# Patient Record
Sex: MALE | Race: WHITE | NOT HISPANIC OR LATINO | Employment: OTHER | URBAN - METROPOLITAN AREA
[De-identification: names, ages, dates, MRNs, and addresses within clinical notes are randomized per-mention and may not be internally consistent; named-entity substitution may affect disease eponyms.]

---

## 2017-02-08 RX ORDER — INSULIN GLARGINE 100 [IU]/ML
INJECTION, SOLUTION SUBCUTANEOUS
Qty: 45 SYRINGE | Refills: 0 | Status: SHIPPED | OUTPATIENT
Start: 2017-02-08 | End: 2017-11-16 | Stop reason: SDUPTHER

## 2017-05-04 ENCOUNTER — TELEPHONE (OUTPATIENT)
Dept: INTERNAL MEDICINE | Facility: CLINIC | Age: 72
End: 2017-05-04

## 2017-05-04 DIAGNOSIS — E78.5 TYPE 2 DIABETES MELLITUS WITH HYPERLIPIDEMIA: Primary | ICD-10-CM

## 2017-05-04 DIAGNOSIS — E11.69 TYPE 2 DIABETES MELLITUS WITH HYPERLIPIDEMIA: Primary | ICD-10-CM

## 2017-05-04 RX ORDER — ATORVASTATIN CALCIUM 40 MG/1
TABLET, FILM COATED ORAL
Qty: 30 TABLET | Refills: 1 | Status: SHIPPED | OUTPATIENT
Start: 2017-05-04 | End: 2017-06-16 | Stop reason: SDUPTHER

## 2017-05-04 RX ORDER — GLIPIZIDE 10 MG/1
TABLET ORAL
Qty: 30 TABLET | Refills: 1 | Status: SHIPPED | OUTPATIENT
Start: 2017-05-04 | End: 2017-09-13 | Stop reason: SDUPTHER

## 2017-05-18 RX ORDER — OMEPRAZOLE 40 MG/1
CAPSULE, DELAYED RELEASE ORAL
Qty: 90 CAPSULE | Refills: 1 | Status: SHIPPED | OUTPATIENT
Start: 2017-05-18 | End: 2017-09-13 | Stop reason: SDUPTHER

## 2017-06-16 ENCOUNTER — PATIENT MESSAGE (OUTPATIENT)
Dept: INTERNAL MEDICINE | Facility: CLINIC | Age: 72
End: 2017-06-16

## 2017-06-16 ENCOUNTER — OFFICE VISIT (OUTPATIENT)
Dept: INTERNAL MEDICINE | Facility: CLINIC | Age: 72
End: 2017-06-16
Payer: MEDICARE

## 2017-06-16 ENCOUNTER — LAB VISIT (OUTPATIENT)
Dept: LAB | Facility: HOSPITAL | Age: 72
End: 2017-06-16
Attending: INTERNAL MEDICINE
Payer: MEDICARE

## 2017-06-16 VITALS
DIASTOLIC BLOOD PRESSURE: 70 MMHG | BODY MASS INDEX: 25.06 KG/M2 | WEIGHT: 175.06 LBS | HEART RATE: 71 BPM | HEIGHT: 70 IN | SYSTOLIC BLOOD PRESSURE: 116 MMHG

## 2017-06-16 DIAGNOSIS — R53.83 FATIGUE, UNSPECIFIED TYPE: ICD-10-CM

## 2017-06-16 DIAGNOSIS — Z12.5 ENCOUNTER FOR SCREENING FOR MALIGNANT NEOPLASM OF PROSTATE: ICD-10-CM

## 2017-06-16 DIAGNOSIS — I15.2 HYPERTENSION ASSOCIATED WITH DIABETES: ICD-10-CM

## 2017-06-16 DIAGNOSIS — Z85.828 HISTORY OF BASAL CELL CARCINOMA: ICD-10-CM

## 2017-06-16 DIAGNOSIS — Z00.00 HEALTH CARE MAINTENANCE: ICD-10-CM

## 2017-06-16 DIAGNOSIS — Z79.4 TYPE 2 DIABETES MELLITUS WITH OTHER SPECIFIED COMPLICATION, WITH LONG-TERM CURRENT USE OF INSULIN: ICD-10-CM

## 2017-06-16 DIAGNOSIS — E11.59 HYPERTENSION ASSOCIATED WITH DIABETES: ICD-10-CM

## 2017-06-16 DIAGNOSIS — E11.69 TYPE 2 DIABETES MELLITUS WITH OTHER SPECIFIED COMPLICATION, WITH LONG-TERM CURRENT USE OF INSULIN: ICD-10-CM

## 2017-06-16 DIAGNOSIS — E78.5 TYPE 2 DIABETES MELLITUS WITH HYPERLIPIDEMIA: ICD-10-CM

## 2017-06-16 DIAGNOSIS — E11.69 TYPE 2 DIABETES MELLITUS WITH HYPERLIPIDEMIA: ICD-10-CM

## 2017-06-16 DIAGNOSIS — K51.919 ULCERATIVE COLITIS WITH COMPLICATION, UNSPECIFIED LOCATION: ICD-10-CM

## 2017-06-16 LAB
ALBUMIN SERPL BCP-MCNC: 3.9 G/DL
ALP SERPL-CCNC: 128 U/L
ALT SERPL W/O P-5'-P-CCNC: 21 U/L
ANION GAP SERPL CALC-SCNC: 9 MMOL/L
AST SERPL-CCNC: 20 U/L
BASOPHILS # BLD AUTO: 0.03 K/UL
BASOPHILS NFR BLD: 0.4 %
BILIRUB SERPL-MCNC: 0.5 MG/DL
BUN SERPL-MCNC: 15 MG/DL
CALCIUM SERPL-MCNC: 9.5 MG/DL
CHLORIDE SERPL-SCNC: 103 MMOL/L
CHOLEST/HDLC SERPL: 5.7 {RATIO}
CO2 SERPL-SCNC: 28 MMOL/L
COMPLEXED PSA SERPL-MCNC: 2.8 NG/ML
CREAT SERPL-MCNC: 1 MG/DL
DIFFERENTIAL METHOD: NORMAL
EOSINOPHIL # BLD AUTO: 0.4 K/UL
EOSINOPHIL NFR BLD: 5.6 %
ERYTHROCYTE [DISTWIDTH] IN BLOOD BY AUTOMATED COUNT: 13.5 %
EST. GFR  (AFRICAN AMERICAN): >60 ML/MIN/1.73 M^2
EST. GFR  (NON AFRICAN AMERICAN): >60 ML/MIN/1.73 M^2
GLUCOSE SERPL-MCNC: 131 MG/DL
HCT VFR BLD AUTO: 45.8 %
HDL/CHOLESTEROL RATIO: 17.6 %
HDLC SERPL-MCNC: 199 MG/DL
HDLC SERPL-MCNC: 35 MG/DL
HGB BLD-MCNC: 14.7 G/DL
LDLC SERPL CALC-MCNC: 141 MG/DL
LYMPHOCYTES # BLD AUTO: 2.5 K/UL
LYMPHOCYTES NFR BLD: 32.2 %
MCH RBC QN AUTO: 28.9 PG
MCHC RBC AUTO-ENTMCNC: 32.1 %
MCV RBC AUTO: 90 FL
MONOCYTES # BLD AUTO: 0.7 K/UL
MONOCYTES NFR BLD: 9.3 %
NEUTROPHILS # BLD AUTO: 4.1 K/UL
NEUTROPHILS NFR BLD: 52.1 %
NONHDLC SERPL-MCNC: 164 MG/DL
PLATELET # BLD AUTO: 243 K/UL
PMV BLD AUTO: 10.2 FL
POTASSIUM SERPL-SCNC: 4.7 MMOL/L
PROT SERPL-MCNC: 7.8 G/DL
RBC # BLD AUTO: 5.09 M/UL
SODIUM SERPL-SCNC: 140 MMOL/L
TRIGL SERPL-MCNC: 115 MG/DL
TSH SERPL DL<=0.005 MIU/L-ACNC: 1.57 UIU/ML
WBC # BLD AUTO: 7.82 K/UL

## 2017-06-16 PROCEDURE — 80053 COMPREHEN METABOLIC PANEL: CPT

## 2017-06-16 PROCEDURE — 3045F PR MOST RECENT HEMOGLOBIN A1C LEVEL 7.0-9.0%: CPT | Mod: S$GLB,,, | Performed by: INTERNAL MEDICINE

## 2017-06-16 PROCEDURE — 83036 HEMOGLOBIN GLYCOSYLATED A1C: CPT

## 2017-06-16 PROCEDURE — 85025 COMPLETE CBC W/AUTO DIFF WBC: CPT

## 2017-06-16 PROCEDURE — 1159F MED LIST DOCD IN RCRD: CPT | Mod: S$GLB,,, | Performed by: INTERNAL MEDICINE

## 2017-06-16 PROCEDURE — 99214 OFFICE O/P EST MOD 30 MIN: CPT | Mod: S$GLB,,, | Performed by: INTERNAL MEDICINE

## 2017-06-16 PROCEDURE — 36415 COLL VENOUS BLD VENIPUNCTURE: CPT | Mod: PO

## 2017-06-16 PROCEDURE — 1126F AMNT PAIN NOTED NONE PRSNT: CPT | Mod: S$GLB,,, | Performed by: INTERNAL MEDICINE

## 2017-06-16 PROCEDURE — 99999 PR PBB SHADOW E&M-EST. PATIENT-LVL III: CPT | Mod: PBBFAC,,, | Performed by: INTERNAL MEDICINE

## 2017-06-16 PROCEDURE — 4010F ACE/ARB THERAPY RXD/TAKEN: CPT | Mod: S$GLB,,, | Performed by: INTERNAL MEDICINE

## 2017-06-16 PROCEDURE — 84443 ASSAY THYROID STIM HORMONE: CPT

## 2017-06-16 PROCEDURE — 84153 ASSAY OF PSA TOTAL: CPT

## 2017-06-16 PROCEDURE — 99499 UNLISTED E&M SERVICE: CPT | Mod: S$GLB,,, | Performed by: INTERNAL MEDICINE

## 2017-06-16 PROCEDURE — 80061 LIPID PANEL: CPT

## 2017-06-16 RX ORDER — ATORVASTATIN CALCIUM 40 MG/1
40 TABLET, FILM COATED ORAL DAILY
Qty: 30 TABLET | Refills: 0 | Status: SHIPPED | OUTPATIENT
Start: 2017-06-16 | End: 2017-06-21 | Stop reason: SDUPTHER

## 2017-06-16 NOTE — PROGRESS NOTES
HISTORY OF PRESENT ILLNESS:  Pt. is a 71 y.o. male presents for monitoring of his DM Type 2, HTN, Hyperlipidemia, sees Dr. Baldemar Rivas for his eye exams.  He is on a moderate intensity statin, lipitor 40 mg po q day.  He is followed by derm, Dr. Ross in [place] for history of BCC.  Also has UC, last flare was a year and a half ago.  He is due for labs, lastLDL was not at goal.  He checks his AM glucoses 3 x per week, 116-160.  He states eh is  Not really following a diabetic diet.    Lab Results   Component Value Date    WBC 7.19 12/13/2016    HGB 14.8 12/13/2016    HCT 44.7 12/13/2016     12/13/2016    CHOL 163 12/13/2016    TRIG 112 12/13/2016    HDL 36 (L) 12/13/2016    ALT 23 12/13/2016    AST 21 12/13/2016     12/13/2016    K 4.4 12/13/2016     12/13/2016    CREATININE 0.9 12/13/2016    BUN 14 12/13/2016    CO2 28 12/13/2016    TSH 1.644 12/13/2016    HGBA1C 7.2 (H) 12/13/2016     Lab Results   Component Value Date    LDLCALC 104.6 12/13/2016       He exercises on an elliptical for 15 minutes 2-3 x a week.      ROS:  GENERAL: No fever, chills, positive fatigability; no weight loss.  SKIN: No rashes, itching or changes in color or texture of skin/history of BCC  HEAD: No headaches or recent head trauma.  EARS: Denies ear pain, discharge or vertigo.  NOSE: No loss of smell, no epistaxis or postnasal drip.  MOUTH & THROAT: No hoarseness or change in voice. No excessive gum bleeding.  NODES: Denies swollen glands.  CHEST: Denies VOGEL, cyanosis, wheezing, cough and sputum production.  CARDIOVASCULAR: Denies chest pain, PND, orthopnea or reduced exercise tolerance.  ABDOMEN: Appetite fine. No weight loss. Denies constipation, diarrhea, abdominal pain, hematemesis or blood in stool.  URINARY: No flank pain, dysuria or hematuria.  PERIPHERAL VASCULAR: No claudication or cyanosis. No edema.  MUSCULOSKELETAL: No joint stiffness or swelling. Denies back pain.  NEUROLOGIC: Denies numbness    PE:  "  Vitals:   Vitals:    06/16/17 0842   BP: 116/70   Pulse: 71     GENERAL: no acute distress, A&Ox3, comfortable.  Male with BMI of 25   HEENT: tympanic membranes clear, nasal mucosa pink, no pharyngeal erythema or exudate  NECK: supple, no cervical lymphadenopathy, no thyromegaly; no supraclavicular nodes;   CHEST:  clear to auscultation bilaterally, no crackles or wheeze; no increased work of breathing;  CARDIOVASCULAR: regular rate and rhythm, no rubs, murmurs or gallops.  ABDOMEN: normal bowel sounds, soft non-tender, non-distended; no palpable organomegaly;   EXT: no clubbing, cyanosis or edema.     ASSESSMENT/PLAN:    Ulcerative colitis: had colonoscopy 6.23.16: UC in remission    DM type 2/uncontrolled/long term use insulin: check hgb a1C;    Hypertension associated with diabetes  -     CBC auto differential; Future; Expected date: 06/16/2017  -     Ambulatory referral to Optometry    Type 2 diabetes mellitus with hyperlipidemia  -     atorvastatin (LIPITOR) 40 MG tablet; Take 1 tablet (40 mg total) by mouth once daily.  Dispense: 30 tablet; Refill: 0  -     Comprehensive metabolic panel; Future; Expected date: 06/16/2017  -     Lipid panel; Future; Expected date: 06/16/2017    History of basal cell carcinoma  -     Ambulatory referral to Dermatology    Fatigue, unspecified type  -     TSH; Future; Expected date: 06/16/2017    Health care maintenance  -     PSA, Screening; Future; Expected date: 06/16/2017    Encounter for screening for malignant neoplasm of prostate   -     PSA, Screening; Future; Expected date: 06/16/2017        Tdap Rx given to pt.    Medication List with Changes/Refills   Current Medications    ASPIRIN 81 MG CHEW    Take 1 tablet (81 mg total) by mouth once daily.    BD ULTRA-FINE EMERSON PEN NEEDLES 32 GAUGE X 5/32" NDLE    USE WITH INSULIN EVERY DAY    BLOOD SUGAR DIAGNOSTIC (ACCU-CHEK CON PLUS TEST STRP) STRP    TEST DAILY.    GLIPIZIDE (GLUCOTROL) 10 MG TABLET    TAKE 1 TABLET (10 MG " TOTAL) BY MOUTH ONCE DAILY.    LANTUS SOLOSTAR 100 UNIT/ML (3 ML) INPN PEN    INJECT 17 UNITS INTO THE SKIN ONCE DAILY.    LOSARTAN (COZAAR) 50 MG TABLET    Take 1 tablet (50 mg total) by mouth once daily.    NEEDLES, INSULIN DISPOSABLE (BD ULTRA-FINE EMERSON PEN NEEDLES MISC)    by Misc.(Non-Drug; Combo Route) route. Test glucose once daily    OMEPRAZOLE (PRILOSEC) 40 MG CAPSULE    TAKE 1 CAPSULE EVERY MORNING   Changed and/or Refilled Medications    Modified Medication Previous Medication    ATORVASTATIN (LIPITOR) 40 MG TABLET atorvastatin (LIPITOR) 40 MG tablet       Take 1 tablet (40 mg total) by mouth once daily.    TAKE 1 TABLET BY MOUTH EVERY DAY   Discontinued Medications    SILDENAFIL (VIAGRA) 50 MG TABLET    Take 1 tablet (50 mg total) by mouth daily as needed for Erectile Dysfunction.     Return in about 6 months (around 12/16/2017).    Call if condition changes or worsens.

## 2017-06-17 PROBLEM — E11.9 LONG-TERM INSULIN USE IN TYPE 2 DIABETES: Status: ACTIVE | Noted: 2017-06-17

## 2017-06-17 PROBLEM — Z79.4 LONG-TERM INSULIN USE IN TYPE 2 DIABETES: Status: ACTIVE | Noted: 2017-06-17

## 2017-06-17 LAB
ESTIMATED AVG GLUCOSE: 154 MG/DL
HBA1C MFR BLD HPLC: 7 %

## 2017-06-19 ENCOUNTER — PATIENT MESSAGE (OUTPATIENT)
Dept: INTERNAL MEDICINE | Facility: CLINIC | Age: 72
End: 2017-06-19

## 2017-06-20 ENCOUNTER — TELEPHONE (OUTPATIENT)
Dept: FAMILY MEDICINE | Facility: CLINIC | Age: 72
End: 2017-06-20

## 2017-06-20 NOTE — TELEPHONE ENCOUNTER
Notified pt of  Lab results. Pt states his RX generic atorvastatin has not changed and he gets it from [facility] pharmacy noted on his chart. Please note pt's A1C results as of 6/16/17.  Please review and advise. Thank you. CLC

## 2017-06-20 NOTE — TELEPHONE ENCOUNTER
----- Message from Shanna Forbes MD sent at 6/20/2017  7:46 AM CDT -----  LDL has gone up 40 points.  Has the generic lipitor changed?  What pharmacy is he getting it from?

## 2017-06-21 ENCOUNTER — TELEPHONE (OUTPATIENT)
Dept: INTERNAL MEDICINE | Facility: CLINIC | Age: 72
End: 2017-06-21

## 2017-06-21 DIAGNOSIS — E11.69 TYPE 2 DIABETES MELLITUS WITH HYPERLIPIDEMIA: Primary | ICD-10-CM

## 2017-06-21 DIAGNOSIS — E11.69 TYPE 2 DIABETES MELLITUS WITH HYPERLIPIDEMIA: ICD-10-CM

## 2017-06-21 DIAGNOSIS — E78.5 TYPE 2 DIABETES MELLITUS WITH HYPERLIPIDEMIA: Primary | ICD-10-CM

## 2017-06-21 DIAGNOSIS — E78.5 TYPE 2 DIABETES MELLITUS WITH HYPERLIPIDEMIA: ICD-10-CM

## 2017-06-21 RX ORDER — EZETIMIBE 10 MG/1
10 TABLET ORAL DAILY
Qty: 30 TABLET | Refills: 0 | Status: SHIPPED | OUTPATIENT
Start: 2017-06-21 | End: 2017-12-15

## 2017-06-21 RX ORDER — ATORVASTATIN CALCIUM 40 MG/1
40 TABLET, FILM COATED ORAL DAILY
Qty: 30 TABLET | Refills: 0 | Status: SHIPPED | OUTPATIENT
Start: 2017-06-21 | End: 2017-08-12 | Stop reason: SDUPTHER

## 2017-06-21 NOTE — TELEPHONE ENCOUNTER
----- Message from Tanisha Harrington LPN sent at 6/20/2017  8:22 AM CDT -----  Notified pt of  Lab results. Pt states his RX generic atorvastatin has not changed and he gets it from [facility] pharmacy noted on his chart. Please note pt's A1C results as of 6/16/17.  Please review and advise. Thank you. CLC

## 2017-07-05 ENCOUNTER — TELEPHONE (OUTPATIENT)
Dept: GASTROENTEROLOGY | Facility: CLINIC | Age: 72
End: 2017-07-05

## 2017-07-05 NOTE — TELEPHONE ENCOUNTER
Spoke to pt.  Explained we have nothing available until 7/14/17.  Doctor out of the office.  Pt states he had some blood in stool Friday.  None since.     Explained will send message to primary care to see if they can evaluate pt.

## 2017-07-05 NOTE — TELEPHONE ENCOUNTER
Spoke with patient, he stated he is feeling better now, he may have had a stomach bug over the weekend, he will call back if he starts feeling bad again.

## 2017-07-05 NOTE — TELEPHONE ENCOUNTER
----- Message from Jakob Shaver sent at 7/5/2017  7:45 AM CDT -----  Contact: self- [phone]  Patient needs to be seen today for stomach flare up.Thanks!

## 2017-08-12 ENCOUNTER — TELEPHONE (OUTPATIENT)
Dept: INTERNAL MEDICINE | Facility: CLINIC | Age: 72
End: 2017-08-12

## 2017-08-12 DIAGNOSIS — E11.69 TYPE 2 DIABETES MELLITUS WITH HYPERLIPIDEMIA: ICD-10-CM

## 2017-08-12 DIAGNOSIS — E78.5 TYPE 2 DIABETES MELLITUS WITH HYPERLIPIDEMIA: ICD-10-CM

## 2017-08-14 RX ORDER — ATORVASTATIN CALCIUM 40 MG/1
40 TABLET, FILM COATED ORAL DAILY
Qty: 30 TABLET | Refills: 0 | Status: SHIPPED | OUTPATIENT
Start: 2017-08-14 | End: 2017-09-10 | Stop reason: SDUPTHER

## 2017-08-14 RX ORDER — PEN NEEDLE, DIABETIC 31 GX5/16"
NEEDLE, DISPOSABLE MISCELLANEOUS
Qty: 100 EACH | Refills: 2 | Status: SHIPPED | OUTPATIENT
Start: 2017-08-14 | End: 2018-07-02 | Stop reason: SDUPTHER

## 2017-08-15 ENCOUNTER — LAB VISIT (OUTPATIENT)
Dept: LAB | Facility: HOSPITAL | Age: 72
End: 2017-08-15
Attending: INTERNAL MEDICINE
Payer: MEDICARE

## 2017-08-15 DIAGNOSIS — E78.5 TYPE 2 DIABETES MELLITUS WITH HYPERLIPIDEMIA: ICD-10-CM

## 2017-08-15 DIAGNOSIS — E11.69 TYPE 2 DIABETES MELLITUS WITH HYPERLIPIDEMIA: ICD-10-CM

## 2017-08-15 LAB
ALBUMIN SERPL BCP-MCNC: 3.7 G/DL
ALP SERPL-CCNC: 108 U/L
ALT SERPL W/O P-5'-P-CCNC: 16 U/L
ANION GAP SERPL CALC-SCNC: 8 MMOL/L
AST SERPL-CCNC: 17 U/L
BILIRUB SERPL-MCNC: 0.5 MG/DL
BUN SERPL-MCNC: 12 MG/DL
CALCIUM SERPL-MCNC: 8.9 MG/DL
CHLORIDE SERPL-SCNC: 106 MMOL/L
CHOLEST/HDLC SERPL: 3.8 {RATIO}
CO2 SERPL-SCNC: 26 MMOL/L
CREAT SERPL-MCNC: 0.9 MG/DL
EST. GFR  (AFRICAN AMERICAN): >60 ML/MIN/1.73 M^2
EST. GFR  (NON AFRICAN AMERICAN): >60 ML/MIN/1.73 M^2
GLUCOSE SERPL-MCNC: 139 MG/DL
HDL/CHOLESTEROL RATIO: 26.4 %
HDLC SERPL-MCNC: 144 MG/DL
HDLC SERPL-MCNC: 38 MG/DL
LDLC SERPL CALC-MCNC: 83.2 MG/DL
NONHDLC SERPL-MCNC: 106 MG/DL
POTASSIUM SERPL-SCNC: 4.2 MMOL/L
PROT SERPL-MCNC: 7.3 G/DL
SODIUM SERPL-SCNC: 140 MMOL/L
TRIGL SERPL-MCNC: 114 MG/DL

## 2017-08-15 PROCEDURE — 80053 COMPREHEN METABOLIC PANEL: CPT

## 2017-08-15 PROCEDURE — 80061 LIPID PANEL: CPT

## 2017-08-15 PROCEDURE — 36415 COLL VENOUS BLD VENIPUNCTURE: CPT | Mod: PO

## 2017-09-10 DIAGNOSIS — E11.69 TYPE 2 DIABETES MELLITUS WITH HYPERLIPIDEMIA: ICD-10-CM

## 2017-09-10 DIAGNOSIS — E78.5 TYPE 2 DIABETES MELLITUS WITH HYPERLIPIDEMIA: ICD-10-CM

## 2017-09-13 DIAGNOSIS — E11.69 TYPE 2 DIABETES MELLITUS WITH HYPERLIPIDEMIA: ICD-10-CM

## 2017-09-13 DIAGNOSIS — E78.5 TYPE 2 DIABETES MELLITUS WITH HYPERLIPIDEMIA: ICD-10-CM

## 2017-09-15 RX ORDER — ATORVASTATIN CALCIUM 40 MG/1
TABLET, FILM COATED ORAL
Qty: 90 TABLET | Refills: 0 | Status: SHIPPED | OUTPATIENT
Start: 2017-09-15 | End: 2017-10-26 | Stop reason: SDUPTHER

## 2017-09-15 RX ORDER — ATORVASTATIN CALCIUM 40 MG/1
40 TABLET, FILM COATED ORAL DAILY
Qty: 30 TABLET | Refills: 0 | Status: SHIPPED | OUTPATIENT
Start: 2017-09-15 | End: 2017-10-15 | Stop reason: SDUPTHER

## 2017-09-15 RX ORDER — GLIPIZIDE 10 MG/1
TABLET ORAL
Qty: 90 TABLET | Refills: 0 | Status: SHIPPED | OUTPATIENT
Start: 2017-09-15 | End: 2017-12-15 | Stop reason: SDUPTHER

## 2017-09-15 RX ORDER — OMEPRAZOLE 40 MG/1
CAPSULE, DELAYED RELEASE ORAL
Qty: 90 CAPSULE | Refills: 0 | Status: SHIPPED | OUTPATIENT
Start: 2017-09-15 | End: 2017-12-15 | Stop reason: SDUPTHER

## 2017-09-19 DIAGNOSIS — E78.5 TYPE 2 DIABETES MELLITUS WITH HYPERLIPIDEMIA: ICD-10-CM

## 2017-09-19 DIAGNOSIS — E11.69 TYPE 2 DIABETES MELLITUS WITH HYPERLIPIDEMIA: ICD-10-CM

## 2017-09-21 RX ORDER — GLIPIZIDE 10 MG/1
TABLET ORAL
Qty: 30 TABLET | Refills: 1 | OUTPATIENT
Start: 2017-09-21

## 2017-10-15 DIAGNOSIS — E78.5 TYPE 2 DIABETES MELLITUS WITH HYPERLIPIDEMIA: ICD-10-CM

## 2017-10-15 DIAGNOSIS — E11.69 TYPE 2 DIABETES MELLITUS WITH HYPERLIPIDEMIA: ICD-10-CM

## 2017-10-16 RX ORDER — ATORVASTATIN CALCIUM 40 MG/1
40 TABLET, FILM COATED ORAL DAILY
Qty: 30 TABLET | Refills: 9 | Status: SHIPPED | OUTPATIENT
Start: 2017-10-16 | End: 2017-10-26 | Stop reason: SDUPTHER

## 2017-10-23 DIAGNOSIS — E78.5 TYPE 2 DIABETES MELLITUS WITH HYPERLIPIDEMIA: ICD-10-CM

## 2017-10-23 DIAGNOSIS — E11.69 TYPE 2 DIABETES MELLITUS WITH HYPERLIPIDEMIA: ICD-10-CM

## 2017-10-26 DIAGNOSIS — E78.5 TYPE 2 DIABETES MELLITUS WITH HYPERLIPIDEMIA: ICD-10-CM

## 2017-10-26 DIAGNOSIS — E11.69 TYPE 2 DIABETES MELLITUS WITH HYPERLIPIDEMIA: ICD-10-CM

## 2017-10-26 DIAGNOSIS — E11.59 HYPERTENSION ASSOCIATED WITH DIABETES: ICD-10-CM

## 2017-10-26 DIAGNOSIS — I15.2 HYPERTENSION ASSOCIATED WITH DIABETES: ICD-10-CM

## 2017-10-26 RX ORDER — ATORVASTATIN CALCIUM 40 MG/1
40 TABLET, FILM COATED ORAL DAILY
Qty: 30 TABLET | Refills: 5 | Status: SHIPPED | OUTPATIENT
Start: 2017-10-26 | End: 2018-05-02 | Stop reason: SDUPTHER

## 2017-10-26 NOTE — TELEPHONE ENCOUNTER
----- Message from Garth Olvera sent at 10/26/2017 12:34 PM CDT -----  [facility]  Need [facility]ill on   Rx atorvastatin (LIPITOR) 40 MG tablet    [id number] [place] [facility] [address]  [place] [address]  Phone: [phone] Fax: [phone]

## 2017-10-27 RX ORDER — ATORVASTATIN CALCIUM 40 MG/1
40 TABLET, FILM COATED ORAL DAILY
Qty: 90 TABLET | Refills: 2 | Status: SHIPPED | OUTPATIENT
Start: 2017-10-27 | End: 2017-12-15 | Stop reason: SDUPTHER

## 2017-11-16 RX ORDER — INSULIN GLARGINE 100 [IU]/ML
INJECTION, SOLUTION SUBCUTANEOUS
Qty: 45 SYRINGE | Refills: 0 | Status: SHIPPED | OUTPATIENT
Start: 2017-11-16 | End: 2018-07-03 | Stop reason: SDUPTHER

## 2017-12-15 ENCOUNTER — OFFICE VISIT (OUTPATIENT)
Dept: INTERNAL MEDICINE | Facility: CLINIC | Age: 72
End: 2017-12-15
Payer: MEDICARE

## 2017-12-15 ENCOUNTER — LAB VISIT (OUTPATIENT)
Dept: LAB | Facility: HOSPITAL | Age: 72
End: 2017-12-15
Attending: INTERNAL MEDICINE
Payer: MEDICARE

## 2017-12-15 ENCOUNTER — TELEPHONE (OUTPATIENT)
Dept: FAMILY MEDICINE | Facility: CLINIC | Age: 72
End: 2017-12-15

## 2017-12-15 VITALS
RESPIRATION RATE: 16 BRPM | HEART RATE: 65 BPM | DIASTOLIC BLOOD PRESSURE: 78 MMHG | TEMPERATURE: 98 F | SYSTOLIC BLOOD PRESSURE: 122 MMHG | WEIGHT: 180.75 LBS | OXYGEN SATURATION: 98 % | BODY MASS INDEX: 25.88 KG/M2 | HEIGHT: 70 IN

## 2017-12-15 DIAGNOSIS — E11.69 TYPE 2 DIABETES MELLITUS WITH HYPERLIPIDEMIA: ICD-10-CM

## 2017-12-15 DIAGNOSIS — I15.2 HYPERTENSION ASSOCIATED WITH DIABETES: ICD-10-CM

## 2017-12-15 DIAGNOSIS — E11.59 HYPERTENSION ASSOCIATED WITH DIABETES: ICD-10-CM

## 2017-12-15 DIAGNOSIS — E78.5 TYPE 2 DIABETES MELLITUS WITH HYPERLIPIDEMIA: ICD-10-CM

## 2017-12-15 DIAGNOSIS — E11.69 CONTROLLED TYPE 2 DIABETES MELLITUS WITH OTHER SPECIFIED COMPLICATION, WITH LONG-TERM CURRENT USE OF INSULIN: ICD-10-CM

## 2017-12-15 DIAGNOSIS — Z79.4 CONTROLLED TYPE 2 DIABETES MELLITUS WITH OTHER SPECIFIED COMPLICATION, WITH LONG-TERM CURRENT USE OF INSULIN: ICD-10-CM

## 2017-12-15 DIAGNOSIS — K51.90 ULCERATIVE COLITIS WITHOUT COMPLICATIONS, UNSPECIFIED LOCATION: ICD-10-CM

## 2017-12-15 DIAGNOSIS — Z85.828 HISTORY OF BASAL CELL CARCINOMA: ICD-10-CM

## 2017-12-15 LAB
ESTIMATED AVG GLUCOSE: 151 MG/DL
HBA1C MFR BLD HPLC: 6.9 %

## 2017-12-15 PROCEDURE — G0008 ADMIN INFLUENZA VIRUS VAC: HCPCS | Mod: S$GLB,,, | Performed by: INTERNAL MEDICINE

## 2017-12-15 PROCEDURE — 99999 PR PBB SHADOW E&M-EST. PATIENT-LVL IV: CPT | Mod: PBBFAC,,, | Performed by: INTERNAL MEDICINE

## 2017-12-15 PROCEDURE — 36415 COLL VENOUS BLD VENIPUNCTURE: CPT | Mod: PO

## 2017-12-15 PROCEDURE — 90662 IIV NO PRSV INCREASED AG IM: CPT | Mod: S$GLB,,, | Performed by: INTERNAL MEDICINE

## 2017-12-15 PROCEDURE — 99499 UNLISTED E&M SERVICE: CPT | Mod: S$GLB,,, | Performed by: INTERNAL MEDICINE

## 2017-12-15 PROCEDURE — 83036 HEMOGLOBIN GLYCOSYLATED A1C: CPT

## 2017-12-15 PROCEDURE — 99214 OFFICE O/P EST MOD 30 MIN: CPT | Mod: S$GLB,,, | Performed by: INTERNAL MEDICINE

## 2017-12-15 RX ORDER — GLIPIZIDE 10 MG/1
10 TABLET ORAL DAILY
Qty: 90 TABLET | Refills: 0 | Status: CANCELLED | OUTPATIENT
Start: 2017-12-15

## 2017-12-15 RX ORDER — OMEPRAZOLE 40 MG/1
40 CAPSULE, DELAYED RELEASE ORAL EVERY MORNING
Qty: 90 CAPSULE | Refills: 0 | Status: SHIPPED | OUTPATIENT
Start: 2017-12-15 | End: 2018-05-02 | Stop reason: SDUPTHER

## 2017-12-15 RX ORDER — GLIPIZIDE 10 MG/1
10 TABLET ORAL DAILY
Qty: 30 TABLET | Refills: 0 | Status: SHIPPED | OUTPATIENT
Start: 2017-12-15 | End: 2018-01-22 | Stop reason: SDUPTHER

## 2017-12-15 RX ORDER — LOSARTAN POTASSIUM 50 MG/1
50 TABLET ORAL DAILY
Qty: 90 TABLET | Refills: 3 | Status: SHIPPED | OUTPATIENT
Start: 2017-12-15 | End: 2018-11-20 | Stop reason: DRUGHIGH

## 2017-12-15 NOTE — LETTER
December 15, 2017    Dr Baldemar Rivas             [facility]  [address] [facility]  [facility] [address]  Phone: [phone]  Fax: [phone] December 15, 2017     Patient: Tres Peace    YOB: 1945   Date of Visit: 12/15/2017       To Whom It May Concern:                                                                                    [facility]    We are seeing Tres Peace in the clinic today at [facility].  Shanna Forbes MD is their PCP.  She/He has an outstanding lab/procedure at this time when reviewing their chart.  To help with our Health Maintenance records will you please supply the following:      [x]  Eye Exam (patient scheduled for Wed 12/20)                                             Please Fax to [facility] at [phone]    Thank you for your help, Cherie Lee LPN-[facility].  If I can be of any assistance you can call at [phone]        [facility]         If you have any questions or concerns, please don't hesitate to call.    Sincerely,        Shanna Forbes MD

## 2017-12-15 NOTE — LETTER
December 21, 2017    Dr Rivas             [facility]  [address] [facility] [place] [address]  Phone: [phone]  Fax: [phone] December 21, 2017     Patient: Tres Peace    YOB: 1945   Date of Visit: 12/15/2017       To Whom It May Concern:                                                                                    [facility]    We are seeing Tres Peace in the clinic today at [facility].  Shanna Forbes MD is their PCP.  She/He has an outstanding lab/procedure at this time when reviewing their chart.  To help with our Health Maintenance records will you please supply the following:       [x]  Eye Exam    (patient stated appt was on 12/20/17)                                          Please Fax to [facility] at [phone]    Thank you for your help, Cherie Lee LPN-[facility].  If I can be of any assistance you can call at [phone]        [facility]         If you have any questions or concerns, please don't hesitate to call.    Sincerely,        Shanna Forbes MD

## 2017-12-15 NOTE — PROGRESS NOTES
HISTORY OF PRESENT ILLNESS:  Pt. is a 72 y.o. male presents for monitoring of his DM Type 2, HTN/in control on meds that include ARB, Hyperlipidemia/in control on lipitor 40 mg, sees Dr. Baldemar Rivas for his eye exams/has appt. Next week.  He is on a moderate intensity statin, lipitor 40 mg po q day. He is followed by derm, Dr. Ross in [place] for history of BCC, is due for appt..  Also has UC, last flare was years ago.    Health Maintenance Topics with due status: Not Due       Topic Last Completion Date    Colonoscopy 06/23/2016    TETANUS VACCINE 06/22/2017    Lipid Panel 08/15/2017     Health Maintenance Due   Topic Date Due    Foot Exam  12/13/2017    Eye Exam  12/15/2017/sched next week;    Hemoglobin A1c  12/16/2017       Lab Results   Component Value Date    WBC 7.82 06/16/2017    HGB 14.7 06/16/2017    HCT 45.8 06/16/2017     06/16/2017    CHOL 144 08/15/2017    TRIG 114 08/15/2017    HDL 38 (L) 08/15/2017    LDLCALC 83.2 08/15/2017    ALT 16 08/15/2017    AST 17 08/15/2017     08/15/2017    K 4.2 08/15/2017     08/15/2017    CREATININE 0.9 08/15/2017    BUN 12 08/15/2017    CO2 26 08/15/2017    ALBUMIN 3.7 08/15/2017    TSH 1.568 06/16/2017    PSA 2.8 06/16/2017    HGBA1C 7.0 (H) 06/16/2017       Past Medical History:   Diagnosis Date    Basal cell carcinoma of cheek     BCC (basal cell carcinoma of skin)     Dr. Shaggy Ross, [facility]    Diabetes mellitus type II     Diabetes with neurologic complications     GERD (gastroesophageal reflux disease)     Hyperlipidemia     Hypertension     Type II or unspecified type diabetes mellitus without mention of complication, not stated as uncontrolled 10/22/2012    Ulcerative colitis, chronic 10/22/2012    Sees Dr. Franco Herndon       Past Surgical History:   Procedure Laterality Date    COLONOSCOPY N/A 6/23/2016    Procedure: COLONOSCOPY;  Surgeon: Arnoldo Carvalho Jr., MD;  Location: [facility];  Service: Endoscopy;  Laterality:  N/A;    COLONOSCOPY W/ BIOPSIES  12/20/2010    BEARD.   Mod to severe inflammation throughout the colon, c/w ulcerative colitis.    COLONOSCOPY W/ BIOPSIES  07/18/2013    ELIZABETH.   Post-ulcerative colitis scars in the entire colon.  Pseudopolyps from the descending to the ascending colon.  Sigmoid diverticulosis.  Internal hemorrhoids.    COLONOSCOPY W/ POLYPECTOMY  05/03/2012    BAERD.   One 5 mm polyp in the transverse colon.  JUVENILE POLYP.  Sigmoid diverticulosis.  No active inflammation. but pseudopolyps and mucosal scarring.      ESOPHAGOGASTRODUODENOSCOPY  12/20/2010    BEARD.   Normal esophagua.  Granular mucosa in the stomach.  Gastritis.  4 mm duodenal bulb ulcer.      ESOPHAGOGASTRODUODENOSCOPY  05/03/2012    BEARD.   Grade A reflux esophagitis.  Small hiatal hernia.  Gastritis.  4 mm duodenal bulb ulcer.  8 mm duod ulcer 2nd part. Duod stenosis 1st part duod.    ESOPHAGOGASTRODUODENOSCOPY  07/18/2013    ELIZABETH.   Reflux esophagitis. Gastritis/antritis. CLOtest negative.  Erythematous mucosa was found in the duodenal bulb.  Duodenal diverticulum.      SKIN BIOPSY      skin CA removal    TONSILLECTOMY         Social History     Social History    Marital status:      Spouse name: N/A    Number of children: 2    Years of education: N/A     Social History Main Topics    Smoking status: Never Smoker    Smokeless tobacco: Never Used    Alcohol use Yes      Comment: 1 drink every other day    Drug use: No    Sexual activity: Yes     Partners: Female     Other Topics Concern    None     Social History Narrative    None       ROS:  GENERAL: No fever, chills, fatigability or weight loss.  SKIN: No rashes, itching or changes in color or texture of skin.  HEAD: No headaches or recent head trauma.  EARS: Denies ear pain, discharge or vertigo.  NOSE: No loss of smell, no epistaxis or postnasal drip.  MOUTH & THROAT: No hoarseness or change in voice. No excessive gum bleeding.  NODES:  Denies swollen glands.  CHEST: Denies VOGEL, cyanosis, wheezing, cough and sputum production.  CARDIOVASCULAR: Denies chest pain, PND, orthopnea or reduced exercise tolerance.  ABDOMEN: Appetite fine. No weight loss. Denies constipation, diarrhea, abdominal pain, hematemesis or blood in stool.  URINARY: No flank pain, dysuria or hematuria.  PERIPHERAL VASCULAR: No claudication or cyanosis. No edema.  MUSCULOSKELETAL: No joint stiffness or swelling. Denies back pain.  NEUROLOGIC: Denies numbness    Protective Sensation (w/ 10 gram monofilament):  Right: Intact  Left: Intact    Visual Inspection:  Dry Skin -  Bilateral    Pedal Pulses:   Right: Present  Left: Present    Posterior tibialis:   Right:Present  Left: Present        PE:   Vitals:   Vitals:    12/15/17 0859   BP: 122/78   Pulse: 65   Resp: 16   Temp: 98.1 °F (36.7 °C)     GENERAL: no acute distress, A&Ox3, comfortable.  Male with BMI of 25   HEENT: tympanic membranes clear, nasal mucosa pink, no pharyngeal erythema or exudate  NECK: supple, no cervical lymphadenopathy, no thyromegaly; no supraclavicular nodes;   CHEST:  clear to auscultation bilaterally, no crackles or wheeze; no increased work of breathing;  CARDIOVASCULAR: regular rate and rhythm, no rubs, murmurs or gallops.  ABDOMEN: normal bowel sounds, soft non-tender, non-distended; no palpable organomegaly;   EXT: no clubbing, cyanosis or edema.     ASSESSMENT/PLAN: is followed by Dr. Vandana mazariegos in [place] for history of BCC, is due for appt..  Also has UC, last flare was years ago.        DM Type 2/controlled: Hgb A1C at goal;    Hypertension associated with diabetes  -     losartan (COZAAR) 50 MG tablet; Take 1 tablet (50 mg total) by mouth once daily.  Dispense: 90 tablet; Refill: 3    Type 2 diabetes mellitus with hyperlipidemia  -     glipiZIDE (GLUCOTROL) 10 MG tablet; Take 1 tablet (10 mg total) by mouth once daily.  Dispense: 90 tablet; Refill: 0    History BCC: is followed by Dr. Vandana mazariegos  "in [place]; will call for appt;    UC: last flare was years ago.          Health Maintenance:    -     Influenza - High Dose (65+) (PF) (IM)        Medication List with Changes/Refills   Current Medications    ASPIRIN 81 MG CHEW    Take 1 tablet (81 mg total) by mouth once daily.    ATORVASTATIN (LIPITOR) 40 MG TABLET    TAKE 1 TABLET (40 MG TOTAL) BY MOUTH ONCE DAILY.    BD ULTRA-FINE EMERSON PEN NEEDLES 32 GAUGE X 5/32" NDLE    USE WITH INSULIN EVERYDAY    BLOOD SUGAR DIAGNOSTIC (ACCU-CHEK CON PLUS TEST STRP) STRP    TEST DAILY.    GLIPIZIDE (GLUCOTROL) 10 MG TABLET    TAKE 1 TABLET EVERY DAY    LANTUS SOLOSTAR 100 UNIT/ML (3 ML) INPN PEN    INJECT 17 UNITS INTO THE SKIN ONCE DAILY.    LOSARTAN (COZAAR) 50 MG TABLET    Take 1 tablet (50 mg total) by mouth once daily.    NEEDLES, INSULIN DISPOSABLE (BD ULTRA-FINE EMERSON PEN NEEDLES MISC)    by Misc.(Non-Drug; Combo Route) route. Test glucose once daily    OMEPRAZOLE (PRILOSEC) 40 MG CAPSULE    TAKE 1 CAPSULE EVERY MORNING   Discontinued Medications    ATORVASTATIN (LIPITOR) 40 MG TABLET    Take 1 tablet (40 mg total) by mouth once daily.    EZETIMIBE (ZETIA) 10 MG TABLET    Take 1 tablet (10 mg total) by mouth once daily.     Call if condition changes or worsens.  "

## 2018-01-18 ENCOUNTER — TELEPHONE (OUTPATIENT)
Dept: ADMINISTRATIVE | Facility: HOSPITAL | Age: 73
End: 2018-01-18

## 2018-01-22 DIAGNOSIS — E11.69 TYPE 2 DIABETES MELLITUS WITH HYPERLIPIDEMIA: ICD-10-CM

## 2018-01-22 DIAGNOSIS — E11.59 HYPERTENSION ASSOCIATED WITH DIABETES: ICD-10-CM

## 2018-01-22 DIAGNOSIS — E78.5 TYPE 2 DIABETES MELLITUS WITH HYPERLIPIDEMIA: ICD-10-CM

## 2018-01-22 DIAGNOSIS — I15.2 HYPERTENSION ASSOCIATED WITH DIABETES: ICD-10-CM

## 2018-01-22 RX ORDER — GLIPIZIDE 10 MG/1
10 TABLET ORAL DAILY
Qty: 30 TABLET | Refills: 5 | Status: SHIPPED | OUTPATIENT
Start: 2018-01-22 | End: 2018-05-02 | Stop reason: SDUPTHER

## 2018-02-26 ENCOUNTER — PES CALL (OUTPATIENT)
Dept: ADMINISTRATIVE | Facility: CLINIC | Age: 73
End: 2018-02-26

## 2018-04-30 ENCOUNTER — TELEPHONE (OUTPATIENT)
Dept: FAMILY MEDICINE | Facility: CLINIC | Age: 73
End: 2018-04-30

## 2018-04-30 DIAGNOSIS — S99.929A INJURY OF FOOT, UNSPECIFIED LATERALITY, INITIAL ENCOUNTER: Primary | ICD-10-CM

## 2018-04-30 NOTE — TELEPHONE ENCOUNTER
Phoned pt in regards to scheduling an appt with podiatry for an infected toe and pt has diabetes. Scheduled for 7 AM tomorrow AM. Pt voiced understanding and his next appt with Dr Forbes had to be cancelled due to Dr Forbes retiring. Pt states he will get back with us in regards to scheduling with another PCP. CLC

## 2018-04-30 NOTE — TELEPHONE ENCOUNTER
Phoned pt in regards to message. Pt states he has clipped his own toenails and seems to have clipped one too short and seems to be inflamed now. Pt needs to see podiatry. Please review and advise on pended referral. Thank you. CLC

## 2018-04-30 NOTE — TELEPHONE ENCOUNTER
----- Message from Lulu Villalobos sent at 4/30/2018  1:44 PM CDT -----  Contact: self  Pt calling to speak to a nurse regarding health. [phone]

## 2018-05-01 ENCOUNTER — TELEPHONE (OUTPATIENT)
Dept: PODIATRY | Facility: CLINIC | Age: 73
End: 2018-05-01

## 2018-05-01 ENCOUNTER — OFFICE VISIT (OUTPATIENT)
Dept: PODIATRY | Facility: CLINIC | Age: 73
End: 2018-05-01
Payer: MEDICARE

## 2018-05-01 VITALS — HEIGHT: 70 IN | WEIGHT: 179.44 LBS | BODY MASS INDEX: 25.69 KG/M2

## 2018-05-01 DIAGNOSIS — L60.0 INGROWN NAIL: Primary | ICD-10-CM

## 2018-05-01 DIAGNOSIS — M79.675 TOE PAIN, LEFT: ICD-10-CM

## 2018-05-01 DIAGNOSIS — L03.032 PARONYCHIA, TOE, LEFT: ICD-10-CM

## 2018-05-01 PROCEDURE — 99999 PR PBB SHADOW E&M-EST. PATIENT-LVL III: CPT | Mod: PBBFAC,,, | Performed by: PODIATRIST

## 2018-05-01 PROCEDURE — 99203 OFFICE O/P NEW LOW 30 MIN: CPT | Mod: S$GLB,,, | Performed by: PODIATRIST

## 2018-05-01 RX ORDER — TOBRAMYCIN 3 MG/ML
SOLUTION/ DROPS OPHTHALMIC
Qty: 5 ML | Refills: 3 | Status: SHIPPED | OUTPATIENT
Start: 2018-05-01 | End: 2018-06-08

## 2018-05-01 NOTE — LETTER
May 1, 2018      Shanna Forbes MD  [address] [facility] [place] [address]           [facility]  [address] [place] [address]  Phone: [phone]          Patient: Tres Peace   MR Number: [id number]   YOB: 1945   Date of Visit: 5/1/2018       Dear Dr. Shanna Forbes:    Thank you for referring Tres Peace to me for evaluation. Attached you will find relevant portions of my assessment and plan of care.    If you have questions, please do not hesitate to call me. I look forward to following Tres Peace along with you.    Sincerely,    Paul Herrera, DPGILMAR    Enclosure  CC:  No Recipients    If you would like to receive this communication electronically, please contact [email or web address] or [phone] to request more information on [facility] Link access.    For providers and/or their staff who would like to refer a patient to [facility], please contact us through our one-stop-shop provider referral line, [facility] , at [phone].    If you feel you have received this communication in error or would no longer like to receive these types of communications, please e-mail [email or web address]

## 2018-05-01 NOTE — PROGRESS NOTES
Subjective:      Patient ID: Tres Peace is a 72 y.o. male.    Chief Complaint: Foot Injury (cut left big toenail 4-5 days ago and still has soreness and throbbing)    Sharp and stinging pain around left big toenail.  Gradual onset, worsening over past several days, aggravated by increased weight bearing, shoe gear, pressure.  No previous medical treatment.  OTC pain med not helping.  Denies trauma, relates prior nail surgery in college.      Review of Systems   Constitution: Negative for chills, diaphoresis, fever, malaise/fatigue and night sweats.   Cardiovascular: Negative for claudication, cyanosis, leg swelling and syncope.   Skin: Positive for nail changes. Negative for color change, dry skin, rash, suspicious lesions and unusual hair distribution.   Musculoskeletal: Negative for falls, joint pain, joint swelling, muscle cramps, muscle weakness and stiffness.   Gastrointestinal: Negative for constipation, diarrhea, nausea and vomiting.   Neurological: Negative for brief paralysis, disturbances in coordination, focal weakness, numbness, paresthesias, sensory change and tremors.           Objective:      Physical Exam   Constitutional: He is oriented to person, place, and time. He appears well-developed and well-nourished. He is cooperative. No distress.   Cardiovascular:   Pulses:       Popliteal pulses are 2+ on the right side, and 2+ on the left side.        Dorsalis pedis pulses are 2+ on the right side, and 2+ on the left side.        Posterior tibial pulses are 2+ on the right side, and 2+ on the left side.   Capillary refill 3 seconds all toes/distal feet, all toes/both feet warm to touch.      Negative lymphadenopathy bilateral popliteal fossa and tarsal tunnel.      Negavie lower extremity edema bilateral.     Musculoskeletal:        Right ankle: He exhibits normal range of motion, no swelling, no ecchymosis, no deformity, no laceration and normal pulse. Achilles tendon normal. Achilles tendon  exhibits no pain, no defect and normal Cuevas's test results.   Normal angle, base, station of gait. All ten toes without clubbing, cyanosis, or signs of ischemia.  No pain to palpation bilateral lower extremities.  Range of motion, stability, muscle strength, and muscle tone normal bilateral feet and legs.     Lymphadenopathy: No inguinal adenopathy noted on the right or left side.   Negative lymphadenopathy bilateral popliteal fossa and tarsal tunnel.    Negative lymphangitic streaking bilateral feet/ankles/legs.   Neurological: He is alert and oriented to person, place, and time. He has normal strength. He displays no atrophy and no tremor. No sensory deficit. He exhibits normal muscle tone. Gait normal.   Reflex Scores:       Patellar reflexes are 2+ on the right side and 2+ on the left side.       Achilles reflexes are 2+ on the right side and 2+ on the left side.  Negative tinel sign to percussion sural, superficial peroneal, deep peroneal, saphenous, and posterior tibial nerves right and left ankles and feet.     Skin: Skin is warm, dry and intact. Capillary refill takes 2 to 3 seconds. No abrasion, no bruising, no burn, no ecchymosis, no laceration, no lesion and no rash noted. He is not diaphoretic. No cyanosis or erythema. No pallor. Nails show no clubbing.   Visible and palpable incurvation of toenail lateral border right hallux with pain to palpation, and focal localized erythema ,  without ulceration, drainage, pus, tracking, fluctuance, malodor, or cardinal signs infection.    Skin is normal age and health appropriate color, turgor, texture, and temperature bilateral lower extremities without ulceration, hyperpigmentation, discoloration, masses nodules or cords palpated.  No ecchymosis, erythema, edema, or cardinal signs of infection bilateral lower extremities.   Psychiatric: He has a normal mood and affect.             Assessment:       Encounter Diagnoses   Name Primary?    Ingrown nail Yes     Paronychia, toe, left     Toe pain, left          Plan:       Tres was seen today for foot injury.    Diagnoses and all orders for this visit:    Ingrown nail    Paronychia, toe, left    Toe pain, left    Other orders  -     tobramycin sulfate 0.3% (TOBREX) 0.3 % ophthalmic solution; 1-2 drops topically twice daily to affected toe(s).      I counseled the patient on his conditions, their implications and medical management.        Discussed conservative treatment with shoes of adequate dimensions, material, and style to alleviate symptoms and delay or prevent surgical intervention.    Rx tobramycin drops bid.  Cover left hallux all times with band aid dressings changing daily.          Follow-up in about 1 week (around 5/8/2018).

## 2018-05-01 NOTE — TELEPHONE ENCOUNTER
Clarified with Pharmacist that the Tobramycin is to be used on the toe, No Cream or ointment prescribed. Notified patient of clarification of Prescription.

## 2018-05-01 NOTE — TELEPHONE ENCOUNTER
----- Message from Jennifer Worthy sent at 5/1/2018  4:50 PM CDT -----  Contact: John  Type:  Pharmacy Calling to Clarify an RX    Name of Caller:  John  [facility]  Prescription Name:  tobramycin sulfate 0.3% (TOBREX) 0.3 % ophthalmic solution  What do they need to clarify?:  Wife stating a cream was supposed to been sent but instead pharmacy received eye drops  Best Call Back Number:  [facility]pharmacy #[id number] - [place] - [facility] AT [address] [address]  Phone: [phone] Fax: [phone]  Additional Information:

## 2018-05-02 ENCOUNTER — TELEPHONE (OUTPATIENT)
Dept: INTERNAL MEDICINE | Facility: CLINIC | Age: 73
End: 2018-05-02

## 2018-05-02 ENCOUNTER — TELEPHONE (OUTPATIENT)
Dept: FAMILY MEDICINE | Facility: CLINIC | Age: 73
End: 2018-05-02

## 2018-05-02 DIAGNOSIS — E78.5 TYPE 2 DIABETES MELLITUS WITH HYPERLIPIDEMIA: ICD-10-CM

## 2018-05-02 DIAGNOSIS — E11.69 TYPE 2 DIABETES MELLITUS WITH HYPERLIPIDEMIA: ICD-10-CM

## 2018-05-02 DIAGNOSIS — I15.2 HYPERTENSION ASSOCIATED WITH DIABETES: ICD-10-CM

## 2018-05-02 DIAGNOSIS — E11.59 HYPERTENSION ASSOCIATED WITH DIABETES: ICD-10-CM

## 2018-05-02 RX ORDER — ATORVASTATIN CALCIUM 40 MG/1
TABLET, FILM COATED ORAL
Qty: 90 TABLET | Refills: 0 | Status: SHIPPED | OUTPATIENT
Start: 2018-05-02 | End: 2018-07-06 | Stop reason: SDUPTHER

## 2018-05-02 RX ORDER — GLIPIZIDE 10 MG/1
TABLET ORAL
Qty: 90 TABLET | Refills: 0 | Status: SHIPPED | OUTPATIENT
Start: 2018-05-02 | End: 2018-07-06 | Stop reason: SDUPTHER

## 2018-05-02 RX ORDER — OMEPRAZOLE 40 MG/1
CAPSULE, DELAYED RELEASE ORAL
Qty: 90 CAPSULE | Refills: 0 | Status: SHIPPED | OUTPATIENT
Start: 2018-05-02 | End: 2018-07-06 | Stop reason: SDUPTHER

## 2018-06-08 ENCOUNTER — OFFICE VISIT (OUTPATIENT)
Dept: FAMILY MEDICINE | Facility: CLINIC | Age: 73
End: 2018-06-08
Payer: MEDICARE

## 2018-06-08 VITALS
HEIGHT: 70 IN | RESPIRATION RATE: 18 BRPM | HEART RATE: 68 BPM | DIASTOLIC BLOOD PRESSURE: 82 MMHG | WEIGHT: 179.44 LBS | BODY MASS INDEX: 25.69 KG/M2 | SYSTOLIC BLOOD PRESSURE: 128 MMHG

## 2018-06-08 DIAGNOSIS — E78.5 TYPE 2 DIABETES MELLITUS WITH HYPERLIPIDEMIA: ICD-10-CM

## 2018-06-08 DIAGNOSIS — I10 ESSENTIAL HYPERTENSION: ICD-10-CM

## 2018-06-08 DIAGNOSIS — I15.2 HYPERTENSION ASSOCIATED WITH DIABETES: ICD-10-CM

## 2018-06-08 DIAGNOSIS — E11.59 HYPERTENSION ASSOCIATED WITH DIABETES: ICD-10-CM

## 2018-06-08 DIAGNOSIS — K51.919 ULCERATIVE COLITIS WITH COMPLICATION, UNSPECIFIED LOCATION: ICD-10-CM

## 2018-06-08 DIAGNOSIS — Z79.4 TYPE 2 DIABETES MELLITUS WITH OTHER SPECIFIED COMPLICATION, WITH LONG-TERM CURRENT USE OF INSULIN: ICD-10-CM

## 2018-06-08 DIAGNOSIS — E78.2 MIXED HYPERLIPIDEMIA: ICD-10-CM

## 2018-06-08 DIAGNOSIS — Z12.5 SCREENING PSA (PROSTATE SPECIFIC ANTIGEN): ICD-10-CM

## 2018-06-08 DIAGNOSIS — E11.69 TYPE 2 DIABETES MELLITUS WITH OTHER SPECIFIED COMPLICATION, WITH LONG-TERM CURRENT USE OF INSULIN: ICD-10-CM

## 2018-06-08 DIAGNOSIS — N52.9 ED (ERECTILE DYSFUNCTION) OF ORGANIC ORIGIN: ICD-10-CM

## 2018-06-08 DIAGNOSIS — Z00.00 WELLNESS EXAMINATION: Primary | ICD-10-CM

## 2018-06-08 DIAGNOSIS — E11.69 TYPE 2 DIABETES MELLITUS WITH HYPERLIPIDEMIA: ICD-10-CM

## 2018-06-08 PROBLEM — R53.83 FATIGUE: Status: RESOLVED | Noted: 2017-06-16 | Resolved: 2018-06-08

## 2018-06-08 PROCEDURE — 99397 PER PM REEVAL EST PAT 65+ YR: CPT | Mod: S$GLB,,, | Performed by: FAMILY MEDICINE

## 2018-06-08 PROCEDURE — 3079F DIAST BP 80-89 MM HG: CPT | Mod: CPTII,S$GLB,, | Performed by: FAMILY MEDICINE

## 2018-06-08 PROCEDURE — 99999 PR PBB SHADOW E&M-EST. PATIENT-LVL III: CPT | Mod: PBBFAC,,, | Performed by: FAMILY MEDICINE

## 2018-06-08 PROCEDURE — 3074F SYST BP LT 130 MM HG: CPT | Mod: CPTII,S$GLB,, | Performed by: FAMILY MEDICINE

## 2018-06-08 PROCEDURE — 3044F HG A1C LEVEL LT 7.0%: CPT | Mod: CPTII,S$GLB,, | Performed by: FAMILY MEDICINE

## 2018-06-08 PROCEDURE — 99499 UNLISTED E&M SERVICE: CPT | Mod: S$GLB,,, | Performed by: FAMILY MEDICINE

## 2018-06-08 RX ORDER — SILDENAFIL CITRATE 20 MG/1
20 TABLET ORAL DAILY PRN
Qty: 50 TABLET | Refills: 0 | Status: SHIPPED | OUTPATIENT
Start: 2018-06-08 | End: 2018-11-12

## 2018-06-08 NOTE — PROGRESS NOTES
Subjective:       Patient ID: Tres Peace is a 72 y.o. male.    Chief Complaint: Establish Care (patient here to establish care)    Here as new patient to me; previously saw Dr. Forbes.  Doing well overall. Due for labs/wellness.      Review of Systems   Constitutional: Negative for chills, fatigue and fever.   Respiratory: Negative for cough, chest tightness and shortness of breath.    Cardiovascular: Negative for chest pain, palpitations and leg swelling.   Gastrointestinal: Negative for abdominal distention and abdominal pain.   Endocrine: Negative for cold intolerance and heat intolerance.   Skin: Negative for rash.   Psychiatric/Behavioral: Negative for dysphoric mood. The patient is not nervous/anxious.        Objective:      Physical Exam   Constitutional: He appears well-developed and well-nourished.   HENT:   Head: Normocephalic and atraumatic.   Cardiovascular: Normal rate, regular rhythm and normal heart sounds.    Pulmonary/Chest: Effort normal and breath sounds normal.   Psychiatric: He has a normal mood and affect.   Nursing note and vitals reviewed.      Assessment:       1. Wellness examination    2. Type 2 diabetes mellitus with hyperlipidemia    3. Type 2 diabetes mellitus with other specified complication, with long-term current use of insulin    4. Essential hypertension    5. Mixed hyperlipidemia    6. Ulcerative colitis with complication, unspecified location    7. Screening PSA (prostate specific antigen)    8. Hypertension associated with diabetes        Plan:       Wellness examination    Type 2 diabetes mellitus with hyperlipidemia    Type 2 diabetes mellitus with other specified complication, with long-term current use of insulin  -     CBC auto differential; Future; Expected date: 06/08/2018  -     Comprehensive metabolic panel; Future; Expected date: 06/08/2018  -     Hemoglobin A1c; Future; Expected date: 06/08/2018  -     Lipid panel; Future; Expected date: 06/08/2018  -      Microalbumin/creatinine urine ratio; Future; Expected date: 06/08/2018  -     TSH; Future; Expected date: 06/08/2018    Essential hypertension  -     CBC auto differential; Future; Expected date: 06/08/2018  -     Comprehensive metabolic panel; Future; Expected date: 06/08/2018  -     Hemoglobin A1c; Future; Expected date: 06/08/2018  -     Lipid panel; Future; Expected date: 06/08/2018  -     Microalbumin/creatinine urine ratio; Future; Expected date: 06/08/2018  -     TSH; Future; Expected date: 06/08/2018    Mixed hyperlipidemia  -     CBC auto differential; Future; Expected date: 06/08/2018  -     Comprehensive metabolic panel; Future; Expected date: 06/08/2018  -     Hemoglobin A1c; Future; Expected date: 06/08/2018  -     Lipid panel; Future; Expected date: 06/08/2018  -     Microalbumin/creatinine urine ratio; Future; Expected date: 06/08/2018  -     TSH; Future; Expected date: 06/08/2018    Ulcerative colitis with complication, unspecified location    Screening PSA (prostate specific antigen)  -     PSA, Screening; Future; Expected date: 06/08/2018    Hypertension associated with diabetes      Update labs and health maintenance.  Will monitor chronic medical issues and continue current plan of care.      Follow-up in about 6 months (around 12/8/2018), or if symptoms worsen or fail to improve.

## 2018-06-12 ENCOUNTER — LAB VISIT (OUTPATIENT)
Dept: LAB | Facility: HOSPITAL | Age: 73
End: 2018-06-12
Attending: FAMILY MEDICINE
Payer: MEDICARE

## 2018-06-12 DIAGNOSIS — E11.69 TYPE 2 DIABETES MELLITUS WITH OTHER SPECIFIED COMPLICATION, WITH LONG-TERM CURRENT USE OF INSULIN: ICD-10-CM

## 2018-06-12 DIAGNOSIS — I10 ESSENTIAL HYPERTENSION: ICD-10-CM

## 2018-06-12 DIAGNOSIS — Z79.4 TYPE 2 DIABETES MELLITUS WITH OTHER SPECIFIED COMPLICATION, WITH LONG-TERM CURRENT USE OF INSULIN: ICD-10-CM

## 2018-06-12 DIAGNOSIS — E78.2 MIXED HYPERLIPIDEMIA: ICD-10-CM

## 2018-06-12 LAB
CREAT UR-MCNC: 166 MG/DL
MICROALBUMIN UR DL<=1MG/L-MCNC: 8 UG/ML
MICROALBUMIN/CREATININE RATIO: 4.8 UG/MG

## 2018-06-12 PROCEDURE — 82043 UR ALBUMIN QUANTITATIVE: CPT

## 2018-06-14 DIAGNOSIS — E11.9 DIABETES MELLITUS WITHOUT COMPLICATION: ICD-10-CM

## 2018-07-02 RX ORDER — INSULIN GLARGINE 100 [IU]/ML
INJECTION, SOLUTION SUBCUTANEOUS
Qty: 45 SYRINGE | Refills: 0 | Status: CANCELLED | OUTPATIENT
Start: 2018-07-02

## 2018-07-02 RX ORDER — PEN NEEDLE, DIABETIC 30 GX3/16"
NEEDLE, DISPOSABLE MISCELLANEOUS
Qty: 100 EACH | Refills: 1 | Status: CANCELLED | OUTPATIENT
Start: 2018-07-02

## 2018-07-02 NOTE — TELEPHONE ENCOUNTER
----- Message from Maria D Okeefe sent at 7/2/2018  9:50 AM CDT -----  Contact: [facility]  Patient need a refill on pen needles and lantus please send to [facility], any questions please call back at [phone] (home)       [facility]/pharmacy #[id number] - [place] - [address]  [place] [address]  Phone: [phone] Fax: [phone]

## 2018-07-03 NOTE — TELEPHONE ENCOUNTER
----- Message from Patience Smith sent at 7/3/2018 10:58 AM CDT -----  Contact: pt  Pt calling states would like to leave a message for Vane or Sharmin regarding just got off the phone [employer] sending a request for medication also [facility],want them to be aware so they can go ahead and fill...[phone] (home)

## 2018-07-03 NOTE — TELEPHONE ENCOUNTER
Spoke to Patient, advised that it may take up to 72 hours to address the refill request. Order has been sent to provider for review.

## 2018-07-03 NOTE — PROGRESS NOTES
"Refill Authorization Note     is requesting a refill authorization.    Brief assessment and rationale for refill: APPROVE; recently est with you.  Amount/Quantity of medication ordered: 90d  Date of last appointment: 6/8/2018     Refills Authorized: Yes  If authorized number of refills: 3           Medication Therapy Plan: recently est. with you, last prescribed by Andrei, APPROVE 12 more months, dc duplicated bd needles(historical)  Name and strength of medication: BD ULTRA-FINE EMERSON PEN NEEDLE 32 gauge x 5/32" Ndle  How patient will take medication: TUD     Comments:   "

## 2018-07-05 ENCOUNTER — TELEPHONE (OUTPATIENT)
Dept: FAMILY MEDICINE | Facility: CLINIC | Age: 73
End: 2018-07-05

## 2018-07-05 NOTE — TELEPHONE ENCOUNTER
----- Message from Cecy May sent at 7/5/2018 11:46 AM CDT -----  Type:  RX Refill Request    Who Called:  [facility]  Refill or New Rx:  refill  RX Name and Strength: LANTUS SOLOSTAR 100 unit/mL (3 mL) InPn pen   How is the patient currently taking it? (ex. 1XDay): INJECT 17 UNITS INTO THE SKIN ONCE DAILY.  Is this a 30 day or 90 day RX:  Preferred Pharmacy with phone number:   [id number] [place] - [address] AT [facility]  [address] [address]  Phone: [phone] Fax: [phone]    [facility] [address] [place] [address]  Phone: [phone] Fax: [phone]    [facility] - [facility] [address]  [address] [address]  Phone: [phone] Fax: [phone]    [facility]/pharmacy #[id number] - [place] - [address] AT [facility]  [address]  [facility] [address]  Phone: [phone] Fax: [phone]    Local or Mail Order:  local  Ordering Provider: [id number]  Additional Information:  Refill need to be refill on pen and med for pen

## 2018-07-05 NOTE — TELEPHONE ENCOUNTER
----- Message from Fabi Ogden sent at 7/5/2018 10:20 AM CDT -----  Type:  Pharmacy Calling to Clarify an RX    Name of Caller:  pharmacy  Pharmacy Name:    [id number] [place] - [address] [facility] AT [address]  [place] [address]  Phone: [phone] Fax: [phone]  Requesting refills       LANTUS SOLOSTAR 100 unit/mL (3 mL) InPn pen and NEEDLES, INSULIN DISPOSABLE (BD ULTRA-FINE EMERSON PEN NEEDLES MISC)

## 2018-07-05 NOTE — PROGRESS NOTES
"Refill Authorization Note     is requesting a refill authorization.    Brief assessment and rationale for refill: QUICK DC; RTS (7/19) pen needles/ APPROVE; Lantus  Amount/Quantity of medication ordered: 90d  Date of last appointment: 6/8/2018     Refills Authorized: Yes  If authorized number of refills: 1           Medication Therapy Plan: recently est with you; Labs-wnl approve 3 more months   Name and strength of medication: pen needle, diabetic (BD ULTRA-FINE EMERSON PEN NEEDLE) 32 gauge x 5/32" Ndle/ insulin glargine (LANTUS SOLOSTAR U-100 INSULIN) 100 unit/mL (3 mL) InPn pen  How patient will take medication: UTD     Comments:   Lab Results   Component Value Date    HGBA1C 7.6 (H) 06/12/2018     Lab Results   Component Value Date    CREATININE 0.9 06/12/2018    BUN 10 06/12/2018     06/12/2018    K 4.7 06/12/2018     06/12/2018    CO2 30 (H) 06/12/2018       "

## 2018-07-06 DIAGNOSIS — E11.59 HYPERTENSION ASSOCIATED WITH DIABETES: ICD-10-CM

## 2018-07-06 DIAGNOSIS — E11.69 TYPE 2 DIABETES MELLITUS WITH HYPERLIPIDEMIA: ICD-10-CM

## 2018-07-06 DIAGNOSIS — E78.5 TYPE 2 DIABETES MELLITUS WITH HYPERLIPIDEMIA: ICD-10-CM

## 2018-07-06 DIAGNOSIS — Z79.899 ENCOUNTER FOR MONITORING LONG-TERM PROTON PUMP INHIBITOR THERAPY: Primary | ICD-10-CM

## 2018-07-06 DIAGNOSIS — Z51.81 ENCOUNTER FOR MONITORING LONG-TERM PROTON PUMP INHIBITOR THERAPY: Primary | ICD-10-CM

## 2018-07-06 DIAGNOSIS — I15.2 HYPERTENSION ASSOCIATED WITH DIABETES: ICD-10-CM

## 2018-07-06 RX ORDER — PEN NEEDLE, DIABETIC 31 GX5/16"
NEEDLE, DISPOSABLE MISCELLANEOUS
Qty: 100 EACH | Refills: 2 | Status: SHIPPED | OUTPATIENT
Start: 2018-07-06 | End: 2019-07-09 | Stop reason: SDUPTHER

## 2018-07-06 RX ORDER — PEN NEEDLE, DIABETIC 30 GX3/16"
NEEDLE, DISPOSABLE MISCELLANEOUS
Qty: 100 EACH | Refills: 11 | Status: SHIPPED | OUTPATIENT
Start: 2018-07-06 | End: 2019-07-10 | Stop reason: SDUPTHER

## 2018-07-06 RX ORDER — INSULIN GLARGINE 100 [IU]/ML
INJECTION, SOLUTION SUBCUTANEOUS
Qty: 45 SYRINGE | Refills: 0 | Status: SHIPPED | OUTPATIENT
Start: 2018-07-06 | End: 2019-07-09 | Stop reason: SDUPTHER

## 2018-07-06 RX ORDER — PEN NEEDLE, DIABETIC 30 GX3/16"
NEEDLE, DISPOSABLE MISCELLANEOUS
Qty: 100 EACH | Refills: 2 | Status: SHIPPED | OUTPATIENT
Start: 2018-07-06 | End: 2019-04-03 | Stop reason: SDUPTHER

## 2018-07-06 NOTE — PROGRESS NOTES
Refill Authorization Note     is requesting a refill authorization.    Brief assessment and rationale for refill: APPROVE: pt needs labs  Amount/Quantity of medication ordered: 90d        Refills Authorized: Yes              Medication Therapy Plan: Labs WNL; Hemoglobin slightly low; A1C increased from 6 mo ago; Lipids WNL; pt needs labs (Mg,B12) will order, NTBS when pt has labs again; approve 3 more on omeprazole/ 12 more on atorvastatin/ 3 more on glipizide  Name and strength of medication: Omeprazole/ Atorvastatin/ Glipizide  How patient will take medication: utd  Medication reconciliation completed: No  Comments:   Lab Results   Component Value Date    WBC 6.59 06/12/2018    HGB 13.9 (L) 06/12/2018    RBC 4.77 06/12/2018    HCT 43.2 06/12/2018    MCV 91 06/12/2018     06/12/2018     Lab Results   Component Value Date    MG 2.0 05/03/2012     Lab Results   Component Value Date    SCGNCBNY48 695 05/03/2012     Lab Results   Component Value Date    CHOL 133 06/12/2018    CHOL 144 08/15/2017    CHOL 199 06/16/2017     Lab Results   Component Value Date    HDL 37 (L) 06/12/2018    HDL 38 (L) 08/15/2017    HDL 35 (L) 06/16/2017     Lab Results   Component Value Date    LDLCALC 79.4 06/12/2018    LDLCALC 83.2 08/15/2017    LDLCALC 141.0 06/16/2017     Lab Results   Component Value Date    TRIG 83 06/12/2018    TRIG 114 08/15/2017    TRIG 115 06/16/2017     Lab Results   Component Value Date    CHOLHDL 27.8 06/12/2018    CHOLHDL 26.4 08/15/2017    CHOLHDL 17.6 (L) 06/16/2017     Lab Results   Component Value Date    HGBA1C 7.6 (H) 06/12/2018     Lab Results   Component Value Date    CREATININE 0.9 06/12/2018    BUN 10 06/12/2018     06/12/2018    K 4.7 06/12/2018     06/12/2018    CO2 30 (H) 06/12/2018

## 2018-07-06 NOTE — TELEPHONE ENCOUNTER
----- Message from Maria D Okeefe sent at 7/6/2018  9:47 AM CDT -----  Contact: self   Patient need a refill on pen needles please send to [facility], any questions please call back at [phone] (home)     [facility]/pharmacy #[id number] - [place] - [address]. AT [address].  [place] [address]  Phone: [phone] Fax: [phone]

## 2018-07-09 RX ORDER — GLIPIZIDE 10 MG/1
10 TABLET ORAL DAILY
Qty: 90 TABLET | Refills: 0 | Status: SHIPPED | OUTPATIENT
Start: 2018-07-09 | End: 2018-10-18 | Stop reason: SDUPTHER

## 2018-07-09 RX ORDER — OMEPRAZOLE 40 MG/1
40 CAPSULE, DELAYED RELEASE ORAL EVERY MORNING
Qty: 90 CAPSULE | Refills: 0 | Status: SHIPPED | OUTPATIENT
Start: 2018-07-09 | End: 2018-10-18 | Stop reason: SDUPTHER

## 2018-07-09 RX ORDER — ATORVASTATIN CALCIUM 40 MG/1
40 TABLET, FILM COATED ORAL DAILY
Qty: 90 TABLET | Refills: 3 | Status: SHIPPED | OUTPATIENT
Start: 2018-07-09 | End: 2019-10-16 | Stop reason: SDUPTHER

## 2018-07-10 NOTE — TELEPHONE ENCOUNTER
----- Message from Kem Cassidy sent at 7/10/2018  9:05 AM CDT -----  Contact: Patient  Tres, [phone]. Calling in regards to some prescriptions for atorvastatin (LIPITOR) 40 MG tablet, omeprazole (PRILOSEC) 40 MG capsule, and glipiZIDE (GLUCOTROL) 10 MG tablet. Says Catie needs authorization to refill. Please advise. Thanks.

## 2018-07-10 NOTE — TELEPHONE ENCOUNTER
Phoned [facility] at [phone] spoke to Balbina to stop the RX atorvastatin, glucotrol and omeprazole RX that were sent yesterday. Balbina voiced understanding. Then called [facility] to give them verbal for the RX atorvastatin 40 mg with sig to take 1 tab PO daily, disp 90 with 3 refills:  glipizide 10 mg with sig to take 1 tablet by mouth 1 daily disp 90 tablets with no refill: and omeprazole 40 mg with sig to take 1 capsule PO daily disp 90 with no refill. Adriel the pharmacist verified with verbal read back. Instructed Adriel that the previous pharmacy instructed the meds could not be refilled until 7/12. Adriel voiced understanding. CLC

## 2018-07-17 ENCOUNTER — PES CALL (OUTPATIENT)
Dept: ADMINISTRATIVE | Facility: CLINIC | Age: 73
End: 2018-07-17

## 2018-10-18 DIAGNOSIS — E11.59 HYPERTENSION ASSOCIATED WITH DIABETES: ICD-10-CM

## 2018-10-18 DIAGNOSIS — E11.69 TYPE 2 DIABETES MELLITUS WITH HYPERLIPIDEMIA: ICD-10-CM

## 2018-10-18 DIAGNOSIS — E78.5 TYPE 2 DIABETES MELLITUS WITH HYPERLIPIDEMIA: ICD-10-CM

## 2018-10-18 DIAGNOSIS — I15.2 HYPERTENSION ASSOCIATED WITH DIABETES: ICD-10-CM

## 2018-10-18 RX ORDER — OMEPRAZOLE 40 MG/1
40 CAPSULE, DELAYED RELEASE ORAL EVERY MORNING
Qty: 90 CAPSULE | Refills: 0 | Status: SHIPPED | OUTPATIENT
Start: 2018-10-18 | End: 2019-02-07 | Stop reason: SDUPTHER

## 2018-10-18 RX ORDER — GLIPIZIDE 10 MG/1
10 TABLET ORAL DAILY
Qty: 90 TABLET | Refills: 0 | Status: SHIPPED | OUTPATIENT
Start: 2018-10-18 | End: 2019-02-28 | Stop reason: SDUPTHER

## 2018-10-24 ENCOUNTER — IMMUNIZATION (OUTPATIENT)
Dept: PHARMACY | Facility: CLINIC | Age: 73
End: 2018-10-24
Payer: MEDICARE

## 2018-11-12 ENCOUNTER — OFFICE VISIT (OUTPATIENT)
Dept: PODIATRY | Facility: CLINIC | Age: 73
End: 2018-11-12
Payer: MEDICARE

## 2018-11-12 ENCOUNTER — NURSE TRIAGE (OUTPATIENT)
Dept: ADMINISTRATIVE | Facility: CLINIC | Age: 73
End: 2018-11-12

## 2018-11-12 VITALS
BODY MASS INDEX: 25.74 KG/M2 | HEART RATE: 72 BPM | SYSTOLIC BLOOD PRESSURE: 167 MMHG | HEIGHT: 70 IN | DIASTOLIC BLOOD PRESSURE: 96 MMHG | WEIGHT: 179.81 LBS

## 2018-11-12 DIAGNOSIS — M79.675 TOE PAIN, LEFT: ICD-10-CM

## 2018-11-12 DIAGNOSIS — E11.49 TYPE II DIABETES MELLITUS WITH NEUROLOGICAL MANIFESTATIONS: ICD-10-CM

## 2018-11-12 DIAGNOSIS — L60.0 INGROWING NAIL, LEFT GREAT TOE: Primary | ICD-10-CM

## 2018-11-12 PROCEDURE — 1101F PT FALLS ASSESS-DOCD LE1/YR: CPT | Mod: CPTII,HCNC,S$GLB, | Performed by: PODIATRIST

## 2018-11-12 PROCEDURE — 3077F SYST BP >= 140 MM HG: CPT | Mod: CPTII,HCNC,S$GLB, | Performed by: PODIATRIST

## 2018-11-12 PROCEDURE — 99999 PR PBB SHADOW E&M-EST. PATIENT-LVL IV: CPT | Mod: PBBFAC,HCNC,, | Performed by: PODIATRIST

## 2018-11-12 PROCEDURE — 99213 OFFICE O/P EST LOW 20 MIN: CPT | Mod: HCNC,S$GLB,, | Performed by: PODIATRIST

## 2018-11-12 PROCEDURE — 3080F DIAST BP >= 90 MM HG: CPT | Mod: CPTII,HCNC,S$GLB, | Performed by: PODIATRIST

## 2018-11-12 PROCEDURE — 3045F PR MOST RECENT HEMOGLOBIN A1C LEVEL 7.0-9.0%: CPT | Mod: CPTII,HCNC,S$GLB, | Performed by: PODIATRIST

## 2018-11-12 NOTE — TELEPHONE ENCOUNTER
Reason for Disposition   Message left on unidentified answering machine.  Answering service notified.    Protocols used: ST NO CONTACT OR DUPLICATE CONTACT CALL-A-JEFF    Spoke to Candie with [facility]. She reports that Mr. Peace blood pressure was 163/90, repeat was 161/102. She states patient is in room for exam and he is asymptomatic. Call placed to patient no answer message left.

## 2018-11-12 NOTE — PROGRESS NOTES
Subjective:      Patient ID: Tres Peace is a 72 y.o. male.    Chief Complaint: Nail Problem (Nail not growing in correctly - left great) and Diabetes Mellitus (PCP:  Dr Torres 6/8/15; HgbA1c: 6/12/18  7.6)    Tres is a 72 y.o. male who presents to the clinic complaining of painful ingrown toenail spicule on the left foot great toe medial border. He relates having cut it wrong and saw Dr. Herrera in May who gave him some drops and asked him to watch it. The are has not grown back right since and there is now a nail spicule growing out that sometimes bothers him. No other acute pedal complaints.     Review of Systems   Constitution: Negative for chills and fever.   Cardiovascular: Negative for claudication and leg swelling.   Respiratory: Negative for shortness of breath.    Skin: Positive for nail changes. Negative for itching and rash.   Musculoskeletal: Negative for muscle cramps, muscle weakness and myalgias.   Gastrointestinal: Negative for nausea and vomiting.   Neurological: Positive for paresthesias. Negative for focal weakness, loss of balance and numbness.           Objective:      Physical Exam   Constitutional: He is oriented to person, place, and time. He appears well-developed and well-nourished. No distress.   Cardiovascular:   Pulses:       Dorsalis pedis pulses are 2+ on the right side, and 2+ on the left side.        Posterior tibial pulses are 2+ on the right side, and 2+ on the left side.   < 3 sec capillary refill time to toes 1-5 bilateral. Toes and feet are warm to touch proximally with normal distal cooling b/l. There is some hair growth on the feet and toes b/l. There is no edema b/l. No spider veins or varicosities present b/l.      Musculoskeletal:   Equinus noted b/l ankles with < 10 deg DF noted. MMT 5/5 in DF/PF/Inv/Ev resistance with no reproduction of pain in any direction. Passive range of motion of ankle and pedal joints is painless b/l.     Feet:   Right Foot:   Protective  Sensation: 10 sites tested. 10 sites sensed.   Left Foot:   Protective Sensation: 10 sites tested. 10 sites sensed.   Neurological: He is alert and oriented to person, place, and time. He has normal strength. He displays no atrophy and no tremor. No sensory deficit. He exhibits normal muscle tone.   Negative tinel sign bilateral.   Skin: Skin is warm, dry and intact. No abrasion, no bruising, no burn, no ecchymosis, no laceration, no lesion, no petechiae and no rash noted. He is not diaphoretic. No cyanosis or erythema. No pallor. Nails show no clubbing.   Skin temperature, texture and turgor within normal limits.    medial hallux nail margin of right foot with ingrown nail plate spicule detached from the rest of the nail. No erythema and no edema is noted there is no granuloma formation noted. No drainage or malodor      Psychiatric: He has a normal mood and affect. His behavior is normal.             Assessment:       Encounter Diagnoses   Name Primary?    Ingrowing nail, left great toe Yes    Toe pain, left     Type II diabetes mellitus with neurological manifestations          Plan:       Tres was seen today for nail problem and diabetes mellitus.    Diagnoses and all orders for this visit:    Ingrowing nail, left great toe    Toe pain, left    Type II diabetes mellitus with neurological manifestations      I counseled the patient on his conditions, their implications and medical management.    Discussed ingrown nail procedure with phenol matrixectomy versus debridement of the spicule, discussed it will likely grow back out the same way in time.    Utilizing sterile toenail clippers I aggressively debrided the offending nail border approximately 3 mm from its edge and carried the nail plate incision down at an angle in order to wedge out the offending cryptotic portion of the nail plate spicule. The offending border was then removed in toto. No blood was drawn. Patient tolerated the procedure well and  related significant relief.    Patient will return PRN if the area bothers him again and will consider a more permanent procedure    Ankit Calix DPM

## 2018-11-13 ENCOUNTER — TELEPHONE (OUTPATIENT)
Dept: FAMILY MEDICINE | Facility: CLINIC | Age: 73
End: 2018-11-13

## 2018-11-13 NOTE — TELEPHONE ENCOUNTER
Spoke with patientin response to message from On call nurse regarding HBP in podiatry visit. Patient states that he had been out of his BP meds for 1 + weeks. Has new refill on BP meds and BP is WNL today. Advised patient to continue to monitor BP at home and call if BP increases or if symptoms develop. Has scheduled office visit in 3 weeks.

## 2018-11-20 ENCOUNTER — OFFICE VISIT (OUTPATIENT)
Dept: FAMILY MEDICINE | Facility: CLINIC | Age: 73
End: 2018-11-20
Payer: MEDICARE

## 2018-11-20 ENCOUNTER — HOSPITAL ENCOUNTER (OUTPATIENT)
Dept: RADIOLOGY | Facility: HOSPITAL | Age: 73
Discharge: HOME | End: 2018-11-20
Attending: NURSE PRACTITIONER
Payer: MEDICARE

## 2018-11-20 VITALS
OXYGEN SATURATION: 96 % | BODY MASS INDEX: 25.15 KG/M2 | HEART RATE: 61 BPM | WEIGHT: 175.69 LBS | DIASTOLIC BLOOD PRESSURE: 98 MMHG | SYSTOLIC BLOOD PRESSURE: 152 MMHG | HEIGHT: 70 IN | TEMPERATURE: 98 F

## 2018-11-20 DIAGNOSIS — E78.2 MIXED HYPERLIPIDEMIA: ICD-10-CM

## 2018-11-20 DIAGNOSIS — Z79.4 TYPE 2 DIABETES MELLITUS WITH OTHER SPECIFIED COMPLICATION, WITH LONG-TERM CURRENT USE OF INSULIN: Primary | ICD-10-CM

## 2018-11-20 DIAGNOSIS — K51.919 ULCERATIVE COLITIS WITH COMPLICATION, UNSPECIFIED LOCATION: ICD-10-CM

## 2018-11-20 DIAGNOSIS — E11.69 TYPE 2 DIABETES MELLITUS WITH OTHER SPECIFIED COMPLICATION, WITH LONG-TERM CURRENT USE OF INSULIN: Primary | ICD-10-CM

## 2018-11-20 DIAGNOSIS — K21.9 GASTROESOPHAGEAL REFLUX DISEASE, ESOPHAGITIS PRESENCE NOT SPECIFIED: ICD-10-CM

## 2018-11-20 DIAGNOSIS — I10 ESSENTIAL HYPERTENSION: ICD-10-CM

## 2018-11-20 DIAGNOSIS — R05.3 CHRONIC COUGH: ICD-10-CM

## 2018-11-20 PROCEDURE — 71046 X-RAY EXAM CHEST 2 VIEWS: CPT | Mod: 26,HCNC,, | Performed by: RADIOLOGY

## 2018-11-20 PROCEDURE — 1101F PT FALLS ASSESS-DOCD LE1/YR: CPT | Mod: CPTII,HCNC,S$GLB, | Performed by: NURSE PRACTITIONER

## 2018-11-20 PROCEDURE — 3080F DIAST BP >= 90 MM HG: CPT | Mod: CPTII,HCNC,S$GLB, | Performed by: NURSE PRACTITIONER

## 2018-11-20 PROCEDURE — 71046 X-RAY EXAM CHEST 2 VIEWS: CPT | Mod: TC,FY,HCNC,PO

## 2018-11-20 PROCEDURE — 3045F PR MOST RECENT HEMOGLOBIN A1C LEVEL 7.0-9.0%: CPT | Mod: CPTII,HCNC,S$GLB, | Performed by: NURSE PRACTITIONER

## 2018-11-20 PROCEDURE — 99214 OFFICE O/P EST MOD 30 MIN: CPT | Mod: HCNC,S$GLB,, | Performed by: NURSE PRACTITIONER

## 2018-11-20 PROCEDURE — 3077F SYST BP >= 140 MM HG: CPT | Mod: CPTII,HCNC,S$GLB, | Performed by: NURSE PRACTITIONER

## 2018-11-20 PROCEDURE — 99999 PR PBB SHADOW E&M-EST. PATIENT-LVL IV: CPT | Mod: PBBFAC,HCNC,, | Performed by: NURSE PRACTITIONER

## 2018-11-20 RX ORDER — LOSARTAN POTASSIUM 100 MG/1
100 TABLET ORAL DAILY
Qty: 90 TABLET | Refills: 3 | Status: SHIPPED | OUTPATIENT
Start: 2018-11-20 | End: 2019-01-29 | Stop reason: SDUPTHER

## 2018-11-20 NOTE — PROGRESS NOTES
Subjective:       Patient ID: Tres Peace is a 72 y.o. male.    Chief Complaint: Elevated blood pressure reading    He ran out of losartan - started taking it again 5 days ago - losartan 50 mg  But Bp still running >140/90s at home with 50 mg       Hypertension   This is a new problem. The current episode started 1 to 4 weeks ago. The problem has been gradually worsening since onset. The problem is uncontrolled. Pertinent negatives include no anxiety, blurred vision, chest pain, headaches, malaise/fatigue, neck pain, orthopnea, palpitations, peripheral edema, PND, shortness of breath or sweats. There are no associated agents to hypertension. Risk factors for coronary artery disease include male gender, obesity, diabetes mellitus, dyslipidemia and family history. Past treatments include angiotensin blockers. The current treatment provides mild improvement. There are no compliance problems (did run out of it and needed to get it refilled ).    Cough   This is a new problem. The current episode started more than 1 month ago. The problem has been gradually worsening. The cough is productive of purulent sputum. Associated symptoms include postnasal drip, rhinorrhea and wheezing. Pertinent negatives include no chest pain, chills, ear congestion, ear pain, fever, headaches, heartburn, hemoptysis, myalgias, nasal congestion, rash, sore throat, shortness of breath, sweats or weight loss. He has tried nothing for the symptoms.     Vitals:    11/20/18 0850   BP: (!) 152/98   Pulse: 61   Temp: 98 °F (36.7 °C)     Review of Systems   Constitutional: Negative.  Negative for chills, fatigue, fever, malaise/fatigue and weight loss.   HENT: Positive for postnasal drip and rhinorrhea. Negative for ear pain, hearing loss and sore throat.    Eyes: Negative.  Negative for blurred vision.   Respiratory: Positive for cough and wheezing. Negative for hemoptysis and shortness of breath.    Cardiovascular: Negative.  Negative for chest  pain, palpitations, orthopnea and PND.   Gastrointestinal: Negative.  Negative for abdominal pain, diarrhea, heartburn and nausea.   Endocrine: Negative.    Genitourinary: Negative.  Negative for dysuria and hematuria.   Musculoskeletal: Negative.  Negative for myalgias and neck pain.   Skin: Negative.  Negative for color change and rash.   Allergic/Immunologic: Negative.    Neurological: Negative.  Negative for numbness and headaches.   Hematological: Negative.    Psychiatric/Behavioral: Negative.        Past Medical History:   Diagnosis Date    Basal cell carcinoma of cheek     BCC (basal cell carcinoma of skin)     Dr. Shaggy Ross, mohs surgery    Diabetes mellitus type II     Diabetes with neurologic complications     GERD (gastroesophageal reflux disease)     Hyperlipidemia     Hypertension     Type II or unspecified type diabetes mellitus without mention of complication, not stated as uncontrolled 10/22/2012    Ulcerative colitis, chronic 10/22/2012    Sees Dr. Franco Herndon     Objective:      Physical Exam   Constitutional: He is oriented to person, place, and time. He appears well-developed and well-nourished.   HENT:   Head: Normocephalic and atraumatic.   Right Ear: Hearing, tympanic membrane and ear canal normal.   Left Ear: Hearing, tympanic membrane and ear canal normal.   Nose: Nose normal. Right sinus exhibits no maxillary sinus tenderness and no frontal sinus tenderness. Left sinus exhibits no maxillary sinus tenderness and no frontal sinus tenderness.   Mouth/Throat: Uvula is midline, oropharynx is clear and moist and mucous membranes are normal.   Eyes: Conjunctivae and EOM are normal. Pupils are equal, round, and reactive to light.   Neck: Normal range of motion. Neck supple.   Cardiovascular: Normal rate, regular rhythm, normal heart sounds and intact distal pulses. Exam reveals no friction rub.   No murmur heard.  Pulmonary/Chest: Effort normal and breath sounds normal. No stridor. No  respiratory distress. He has no wheezes.   Abdominal: Soft. Bowel sounds are normal.   Musculoskeletal: Normal range of motion.   Neurological: He is alert and oriented to person, place, and time.   Skin: Skin is warm and dry.   Psychiatric: He has a normal mood and affect. His behavior is normal. Judgment and thought content normal.   Nursing note and vitals reviewed.      Assessment:       1. Type 2 diabetes mellitus with other specified complication, with long-term current use of insulin    2. Essential hypertension    3. Mixed hyperlipidemia    4. Gastroesophageal reflux disease, esophagitis presence not specified    5. Ulcerative colitis with complication, unspecified location    6. Chronic cough        Plan:       Type 2 diabetes mellitus with other specified complication, with long-term current use of insulin  -     CBC auto differential; Future; Expected date: 11/20/2018  -     Comprehensive metabolic panel; Future; Expected date: 11/20/2018  -     Cancel: Lipid panel; Future; Expected date: 11/20/2018  -     Hemoglobin A1c; Future; Expected date: 11/20/2018    Essential hypertension  -     CBC auto differential; Future; Expected date: 11/20/2018  -     Comprehensive metabolic panel; Future; Expected date: 11/20/2018  -     Cancel: Lipid panel; Future; Expected date: 11/20/2018  -     Hemoglobin A1c; Future; Expected date: 11/20/2018  -     losartan (COZAAR) 100 MG tablet; Take 1 tablet (100 mg total) by mouth once daily.  Dispense: 90 tablet; Refill: 3  Increase to 100 mg tablet for losartan     Mixed hyperlipidemia  -     CBC auto differential; Future; Expected date: 11/20/2018  -     Comprehensive metabolic panel; Future; Expected date: 11/20/2018  -     Cancel: Lipid panel; Future; Expected date: 11/20/2018  -     Hemoglobin A1c; Future; Expected date: 11/20/2018  -     Lipid panel; Future; Expected date: 11/20/2018    Gastroesophageal reflux disease, esophagitis presence not specified  -     CBC auto  differential; Future; Expected date: 11/20/2018  -     Comprehensive metabolic panel; Future; Expected date: 11/20/2018  -     Cancel: Lipid panel; Future; Expected date: 11/20/2018  -     Hemoglobin A1c; Future; Expected date: 11/20/2018  On lipitor - stay on same dose     Ulcerative colitis with complication, unspecified location  -     CBC auto differential; Future; Expected date: 11/20/2018  -     Comprehensive metabolic panel; Future; Expected date: 11/20/2018  -     Cancel: Lipid panel; Future; Expected date: 11/20/2018  -     Hemoglobin A1c; Future; Expected date: 11/20/2018  Follows with GI- Dr Carvalho - next colonoscopy due 2019     Chronic cough  -     X-Ray Chest PA And Lateral; Future; Expected date: 11/20/2018            Discussed will get labs and cxr for cough   Will call with update   Keep appt with Dr Torres 12-3 for BP check and follow up

## 2018-12-03 ENCOUNTER — OFFICE VISIT (OUTPATIENT)
Dept: FAMILY MEDICINE | Facility: CLINIC | Age: 73
End: 2018-12-03
Payer: MEDICARE

## 2018-12-03 VITALS
DIASTOLIC BLOOD PRESSURE: 78 MMHG | HEIGHT: 70 IN | OXYGEN SATURATION: 97 % | HEART RATE: 71 BPM | SYSTOLIC BLOOD PRESSURE: 134 MMHG | WEIGHT: 177 LBS | BODY MASS INDEX: 25.34 KG/M2

## 2018-12-03 DIAGNOSIS — Z79.4 TYPE 2 DIABETES MELLITUS WITH OTHER SPECIFIED COMPLICATION, WITH LONG-TERM CURRENT USE OF INSULIN: ICD-10-CM

## 2018-12-03 DIAGNOSIS — E78.2 MIXED HYPERLIPIDEMIA: ICD-10-CM

## 2018-12-03 DIAGNOSIS — Z12.5 SCREENING PSA (PROSTATE SPECIFIC ANTIGEN): ICD-10-CM

## 2018-12-03 DIAGNOSIS — I10 ESSENTIAL HYPERTENSION: Primary | ICD-10-CM

## 2018-12-03 DIAGNOSIS — E78.5 TYPE 2 DIABETES MELLITUS WITH HYPERLIPIDEMIA: ICD-10-CM

## 2018-12-03 DIAGNOSIS — E11.69 TYPE 2 DIABETES MELLITUS WITH HYPERLIPIDEMIA: ICD-10-CM

## 2018-12-03 DIAGNOSIS — E11.69 TYPE 2 DIABETES MELLITUS WITH OTHER SPECIFIED COMPLICATION, WITH LONG-TERM CURRENT USE OF INSULIN: ICD-10-CM

## 2018-12-03 PROCEDURE — 3045F PR MOST RECENT HEMOGLOBIN A1C LEVEL 7.0-9.0%: CPT | Mod: CPTII,HCNC,S$GLB, | Performed by: FAMILY MEDICINE

## 2018-12-03 PROCEDURE — 3075F SYST BP GE 130 - 139MM HG: CPT | Mod: CPTII,HCNC,S$GLB, | Performed by: FAMILY MEDICINE

## 2018-12-03 PROCEDURE — 99999 PR PBB SHADOW E&M-EST. PATIENT-LVL III: CPT | Mod: PBBFAC,HCNC,, | Performed by: FAMILY MEDICINE

## 2018-12-03 PROCEDURE — 99214 OFFICE O/P EST MOD 30 MIN: CPT | Mod: HCNC,S$GLB,, | Performed by: FAMILY MEDICINE

## 2018-12-03 PROCEDURE — 1101F PT FALLS ASSESS-DOCD LE1/YR: CPT | Mod: CPTII,HCNC,S$GLB, | Performed by: FAMILY MEDICINE

## 2018-12-03 PROCEDURE — 3078F DIAST BP <80 MM HG: CPT | Mod: CPTII,HCNC,S$GLB, | Performed by: FAMILY MEDICINE

## 2018-12-03 NOTE — PROGRESS NOTES
Subjective:       Patient ID: Tres Peace is a 72 y.o. male.    Chief Complaint: Annual Exam and Hypertension    Here for f/u htn and DM II.  Doing well overall and had labs a few weeks ago. New dose of losartan helping with bp control.  Home bp running 130s/80s.       Hypertension   This is a chronic problem. The current episode started more than 1 year ago. The problem is controlled. Pertinent negatives include no chest pain, palpitations or shortness of breath.   Diabetes   He presents for his follow-up diabetic visit. He has type 2 diabetes mellitus. His disease course has been stable. Pertinent negatives for hypoglycemia include no nervousness/anxiousness. Pertinent negatives for diabetes include no chest pain and no fatigue.     Review of Systems   Constitutional: Negative for chills, fatigue and fever.   Respiratory: Negative for cough, chest tightness and shortness of breath.    Cardiovascular: Negative for chest pain, palpitations and leg swelling.   Endocrine: Negative for cold intolerance and heat intolerance.   Skin: Negative for rash.   Psychiatric/Behavioral: Negative for dysphoric mood. The patient is not nervous/anxious.        Objective:      Physical Exam   Constitutional: He appears well-developed and well-nourished.   HENT:   Head: Normocephalic and atraumatic.   Cardiovascular: Normal rate, regular rhythm and normal heart sounds.   Pulmonary/Chest: Effort normal and breath sounds normal.   Psychiatric: He has a normal mood and affect.   Nursing note and vitals reviewed.      Assessment:       1. Essential hypertension    2. Mixed hyperlipidemia    3. Type 2 diabetes mellitus with hyperlipidemia    4. Type 2 diabetes mellitus with other specified complication, with long-term current use of insulin    5. Screening PSA (prostate specific antigen)        Plan:       Essential hypertension  -     CBC auto differential; Future; Expected date: 12/03/2018  -     Comprehensive metabolic panel;  Future; Expected date: 12/03/2018  -     Hemoglobin A1c; Future; Expected date: 12/03/2018  -     Lipid panel; Future; Expected date: 12/03/2018  -     Microalbumin/creatinine urine ratio; Future; Expected date: 12/03/2018  -     TSH; Future; Expected date: 12/03/2018    Mixed hyperlipidemia  -     CBC auto differential; Future; Expected date: 12/03/2018  -     Comprehensive metabolic panel; Future; Expected date: 12/03/2018  -     Hemoglobin A1c; Future; Expected date: 12/03/2018  -     Lipid panel; Future; Expected date: 12/03/2018  -     Microalbumin/creatinine urine ratio; Future; Expected date: 12/03/2018  -     TSH; Future; Expected date: 12/03/2018    Type 2 diabetes mellitus with hyperlipidemia  -     CBC auto differential; Future; Expected date: 12/03/2018  -     Comprehensive metabolic panel; Future; Expected date: 12/03/2018  -     Hemoglobin A1c; Future; Expected date: 12/03/2018  -     Lipid panel; Future; Expected date: 12/03/2018  -     Microalbumin/creatinine urine ratio; Future; Expected date: 12/03/2018  -     TSH; Future; Expected date: 12/03/2018    Type 2 diabetes mellitus with other specified complication, with long-term current use of insulin    Screening PSA (prostate specific antigen)  -     PSA, Screening; Future; Expected date: 12/03/2018      Reviewed recent labs and at goal.  Will monitor chronic medical issues and continue current plan of care.  Can come in for nurse bp and check new home monitor.    Follow-up in about 6 months (around 6/3/2019), or if symptoms worsen or fail to improve.

## 2019-01-07 ENCOUNTER — TELEPHONE (OUTPATIENT)
Dept: FAMILY MEDICINE | Facility: CLINIC | Age: 74
End: 2019-01-07

## 2019-01-07 NOTE — TELEPHONE ENCOUNTER
----- Message from Salma Mcdonald sent at 1/7/2019  8:56 AM CST -----  Type: Needs Medical Advice    Who Called:  Patient  Symptoms (please be specific):  Bad cough with heavy congestion  How long has patient had these symptoms:  1 week  Pharmacy name and phone #:    [id number] [place] [facility] [address]  [place] [address]  Phone: [phone] Fax: [phone]    Best Call Back Number: [phone]

## 2019-01-07 NOTE — TELEPHONE ENCOUNTER
He saw me over a month ago - this is a different illness - he would need to be seen   The appt I saw him for was check up and chronic cough - nothing acute at that time

## 2019-01-07 NOTE — TELEPHONE ENCOUNTER
Pt notified of need for appt, offered appt for tomorrow; pt refused and asked for Thursday appt, appt scheduled

## 2019-01-07 NOTE — TELEPHONE ENCOUNTER
Callback to patient; c/o congestion, afebrile, non-productive cough X5-6 days, has been taking cough syrup and Flonase; pt asking if something can be called in, refused appt at this time; CHRIS Landry on 11/20/18; please advise

## 2019-01-09 NOTE — PROGRESS NOTES
Subjective:       Patient ID: Tres Peace is a 73 y.o. male.    Chief Complaint: Cough and Nasal Congestion    Cough   This is a new problem. The current episode started 1 to 4 weeks ago. The problem has been gradually worsening. The cough is productive of sputum. Associated symptoms include nasal congestion, postnasal drip, rhinorrhea, shortness of breath and wheezing. Pertinent negatives include no chest pain, chills, ear congestion, ear pain, fever, headaches, heartburn, hemoptysis, myalgias, rash, sore throat, sweats or weight loss. The symptoms are aggravated by lying down. Treatments tried: flonase and cough meds      Vitals:    01/10/19 0802   BP: 120/82   Pulse: 76   Resp: 18   Temp: 97.7 °F (36.5 °C)     Review of Systems   Constitutional: Negative for chills, fatigue, fever and weight loss.   HENT: Positive for postnasal drip and rhinorrhea. Negative for ear pain and sore throat.    Eyes: Negative.    Respiratory: Positive for shortness of breath and wheezing. Negative for cough and hemoptysis.    Cardiovascular: Negative for chest pain.   Gastrointestinal: Negative for abdominal pain, diarrhea, heartburn and nausea.   Endocrine: Negative.    Genitourinary: Negative for dysuria and hematuria.   Musculoskeletal: Negative.  Negative for myalgias.   Skin: Negative for color change and rash.   Allergic/Immunologic: Negative.    Neurological: Negative.  Negative for numbness and headaches.   Hematological: Negative.        Past Medical History:   Diagnosis Date    Basal cell carcinoma of cheek     BCC (basal cell carcinoma of skin)     Dr. Shaggy Ross, [facility]s surgery    Diabetes mellitus type II     Diabetes with neurologic complications     GERD (gastroesophageal reflux disease)     Hyperlipidemia     Hypertension     Type II or unspecified type diabetes mellitus without mention of complication, not stated as uncontrolled 10/22/2012    Ulcerative colitis, chronic 10/22/2012    Sees Dr. Franco Herndon      Objective:      Physical Exam   Constitutional: He is oriented to person, place, and time. He appears well-developed and well-nourished.   HENT:   Head: Normocephalic and atraumatic.   Right Ear: External ear normal.   Left Ear: External ear normal.   Nose: Nose normal.   Mouth/Throat: Uvula is midline. Posterior oropharyngeal erythema present.   Eyes: Conjunctivae and EOM are normal. Pupils are equal, round, and reactive to light.   Neck: Normal range of motion. Neck supple.   Cardiovascular: Normal rate, regular rhythm, normal heart sounds and intact distal pulses. Exam reveals no friction rub.   No murmur heard.  Pulmonary/Chest: Effort normal and breath sounds normal. No stridor. No respiratory distress. He has no wheezes.   Abdominal: Soft. Bowel sounds are normal.   Musculoskeletal: Normal range of motion.   Neurological: He is alert and oriented to person, place, and time.   Skin: Skin is warm and dry.   Psychiatric: He has a normal mood and affect. His behavior is normal.   Nursing note and vitals reviewed.      Assessment:       1. Cough    2. Type 2 diabetes mellitus with other specified complication, with long-term current use of insulin    3. Type 2 diabetes mellitus with hyperlipidemia    4. Viral illness        Plan:       Cough  -     X-Ray Chest PA And Lateral; Future; Expected date: 01/10/2019  -     betamethasone acetate-betamethasone sodium phosphate injection 6 mg  -     guaifenesin-codeine 100-10 mg/5 ml (CHERATUSSIN AC)  mg/5 mL syrup; Take 5 mLs by mouth 3 (three) times daily as needed for Cough.  Dispense: 120 mL; Refill: 0    Type 2 diabetes mellitus with other specified complication, with long-term current use of insulin  Stable on meds    Type 2 diabetes mellitus with hyperlipidemia  Stable on meds    Viral illness  Discussed add OTC meds     Call If not better

## 2019-01-10 ENCOUNTER — OFFICE VISIT (OUTPATIENT)
Dept: FAMILY MEDICINE | Facility: CLINIC | Age: 74
End: 2019-01-10
Payer: MEDICARE

## 2019-01-10 ENCOUNTER — HOSPITAL ENCOUNTER (OUTPATIENT)
Dept: RADIOLOGY | Facility: HOSPITAL | Age: 74
Discharge: HOME | End: 2019-01-10
Attending: NURSE PRACTITIONER
Payer: MEDICARE

## 2019-01-10 ENCOUNTER — PATIENT MESSAGE (OUTPATIENT)
Dept: FAMILY MEDICINE | Facility: CLINIC | Age: 74
End: 2019-01-10

## 2019-01-10 VITALS
WEIGHT: 172.38 LBS | BODY MASS INDEX: 24.68 KG/M2 | TEMPERATURE: 98 F | DIASTOLIC BLOOD PRESSURE: 82 MMHG | OXYGEN SATURATION: 95 % | HEIGHT: 70 IN | RESPIRATION RATE: 18 BRPM | SYSTOLIC BLOOD PRESSURE: 120 MMHG | HEART RATE: 76 BPM

## 2019-01-10 DIAGNOSIS — E11.69 TYPE 2 DIABETES MELLITUS WITH HYPERLIPIDEMIA: ICD-10-CM

## 2019-01-10 DIAGNOSIS — Z79.4 TYPE 2 DIABETES MELLITUS WITH OTHER SPECIFIED COMPLICATION, WITH LONG-TERM CURRENT USE OF INSULIN: ICD-10-CM

## 2019-01-10 DIAGNOSIS — E78.5 TYPE 2 DIABETES MELLITUS WITH HYPERLIPIDEMIA: ICD-10-CM

## 2019-01-10 DIAGNOSIS — E11.69 TYPE 2 DIABETES MELLITUS WITH OTHER SPECIFIED COMPLICATION, WITH LONG-TERM CURRENT USE OF INSULIN: ICD-10-CM

## 2019-01-10 DIAGNOSIS — B34.9 VIRAL ILLNESS: ICD-10-CM

## 2019-01-10 DIAGNOSIS — R05.9 COUGH: Primary | ICD-10-CM

## 2019-01-10 DIAGNOSIS — R05.9 COUGH: ICD-10-CM

## 2019-01-10 PROCEDURE — 71046 X-RAY EXAM CHEST 2 VIEWS: CPT | Mod: TC,FY,HCNC,PO

## 2019-01-10 PROCEDURE — 71046 XR CHEST PA AND LATERAL: ICD-10-PCS | Mod: 26,HCNC,, | Performed by: RADIOLOGY

## 2019-01-10 PROCEDURE — 3079F PR MOST RECENT DIASTOLIC BLOOD PRESSURE 80-89 MM HG: ICD-10-PCS | Mod: CPTII,HCNC,S$GLB, | Performed by: NURSE PRACTITIONER

## 2019-01-10 PROCEDURE — 99214 PR OFFICE/OUTPT VISIT, EST, LEVL IV, 30-39 MIN: ICD-10-PCS | Mod: HCNC,25,S$GLB, | Performed by: NURSE PRACTITIONER

## 2019-01-10 PROCEDURE — 99999 PR PBB SHADOW E&M-EST. PATIENT-LVL V: CPT | Mod: PBBFAC,HCNC,, | Performed by: NURSE PRACTITIONER

## 2019-01-10 PROCEDURE — 3079F DIAST BP 80-89 MM HG: CPT | Mod: CPTII,HCNC,S$GLB, | Performed by: NURSE PRACTITIONER

## 2019-01-10 PROCEDURE — 1101F PR PT FALLS ASSESS DOC 0-1 FALLS W/OUT INJ PAST YR: ICD-10-PCS | Mod: CPTII,HCNC,S$GLB, | Performed by: NURSE PRACTITIONER

## 2019-01-10 PROCEDURE — 1101F PT FALLS ASSESS-DOCD LE1/YR: CPT | Mod: CPTII,HCNC,S$GLB, | Performed by: NURSE PRACTITIONER

## 2019-01-10 PROCEDURE — 71046 X-RAY EXAM CHEST 2 VIEWS: CPT | Mod: 26,HCNC,, | Performed by: RADIOLOGY

## 2019-01-10 PROCEDURE — 3045F PR MOST RECENT HEMOGLOBIN A1C LEVEL 7.0-9.0%: ICD-10-PCS | Mod: CPTII,HCNC,S$GLB, | Performed by: NURSE PRACTITIONER

## 2019-01-10 PROCEDURE — 99214 OFFICE O/P EST MOD 30 MIN: CPT | Mod: HCNC,25,S$GLB, | Performed by: NURSE PRACTITIONER

## 2019-01-10 PROCEDURE — 99999 PR PBB SHADOW E&M-EST. PATIENT-LVL V: ICD-10-PCS | Mod: PBBFAC,HCNC,, | Performed by: NURSE PRACTITIONER

## 2019-01-10 PROCEDURE — 3074F PR MOST RECENT SYSTOLIC BLOOD PRESSURE < 130 MM HG: ICD-10-PCS | Mod: CPTII,HCNC,S$GLB, | Performed by: NURSE PRACTITIONER

## 2019-01-10 PROCEDURE — 3074F SYST BP LT 130 MM HG: CPT | Mod: CPTII,HCNC,S$GLB, | Performed by: NURSE PRACTITIONER

## 2019-01-10 PROCEDURE — 96372 THER/PROPH/DIAG INJ SC/IM: CPT | Mod: HCNC,59,S$GLB, | Performed by: NURSE PRACTITIONER

## 2019-01-10 PROCEDURE — 3045F PR MOST RECENT HEMOGLOBIN A1C LEVEL 7.0-9.0%: CPT | Mod: CPTII,HCNC,S$GLB, | Performed by: NURSE PRACTITIONER

## 2019-01-10 PROCEDURE — 96372 PR INJECTION,THERAP/PROPH/DIAG2ST, IM OR SUBCUT: ICD-10-PCS | Mod: HCNC,59,S$GLB, | Performed by: NURSE PRACTITIONER

## 2019-01-10 RX ORDER — CODEINE PHOSPHATE AND GUAIFENESIN 10; 100 MG/5ML; MG/5ML
5 SOLUTION ORAL 3 TIMES DAILY PRN
Qty: 120 ML | Refills: 0 | Status: SHIPPED | OUTPATIENT
Start: 2019-01-10 | End: 2019-01-20

## 2019-01-10 RX ORDER — BETAMETHASONE SODIUM PHOSPHATE AND BETAMETHASONE ACETATE 3; 3 MG/ML; MG/ML
6 INJECTION, SUSPENSION INTRA-ARTICULAR; INTRALESIONAL; INTRAMUSCULAR; SOFT TISSUE
Status: COMPLETED | OUTPATIENT
Start: 2019-01-10 | End: 2019-01-10

## 2019-01-10 RX ADMIN — BETAMETHASONE SODIUM PHOSPHATE AND BETAMETHASONE ACETATE 6 MG: 3; 3 INJECTION, SUSPENSION INTRA-ARTICULAR; INTRALESIONAL; INTRAMUSCULAR; SOFT TISSUE at 08:01

## 2019-01-24 ENCOUNTER — TELEPHONE (OUTPATIENT)
Dept: FAMILY MEDICINE | Facility: CLINIC | Age: 74
End: 2019-01-24

## 2019-01-24 NOTE — TELEPHONE ENCOUNTER
Patient states he is better since last visit but is still with cough and some chest congestion. Advised patient OTC meds mucinex and nasacort nasal spray. Call if no better or if fever develops.

## 2019-01-24 NOTE — TELEPHONE ENCOUNTER
----- Message from Jennifer Worthy sent at 1/24/2019 10:43 AM CST -----  Type:  Sooner Apoointment Request    Caller is requesting a sooner appointment.  Caller declined first available appointment listed below.  Caller will not accept being placed on the waitlist and is requesting a message be sent to doctor.    Name of Caller: Patient  When is the first available appointment?  4/1/19  Symptoms:  Congestion f/u  Best Call Back Number:  [phone] (home)     Additional Information:  Stating he saw the NP two weeks ago and still feeling bad, was given a steroid shot and did not help. Would like to see the doctor instead

## 2019-01-29 DIAGNOSIS — I10 ESSENTIAL HYPERTENSION: ICD-10-CM

## 2019-01-30 RX ORDER — LOSARTAN POTASSIUM 100 MG/1
100 TABLET ORAL DAILY
Qty: 90 TABLET | Refills: 3 | Status: SHIPPED | OUTPATIENT
Start: 2019-01-30 | End: 2020-02-03

## 2019-01-31 RX ORDER — OMEPRAZOLE 40 MG/1
40 CAPSULE, DELAYED RELEASE ORAL EVERY MORNING
Qty: 90 CAPSULE | Refills: 0 | Status: SHIPPED | OUTPATIENT
Start: 2019-01-31 | End: 2019-06-29 | Stop reason: SDUPTHER

## 2019-02-07 ENCOUNTER — TELEPHONE (OUTPATIENT)
Dept: FAMILY MEDICINE | Facility: CLINIC | Age: 74
End: 2019-02-07

## 2019-02-07 ENCOUNTER — OFFICE VISIT (OUTPATIENT)
Dept: FAMILY MEDICINE | Facility: CLINIC | Age: 74
End: 2019-02-07
Payer: MEDICARE

## 2019-02-07 VITALS
HEART RATE: 94 BPM | TEMPERATURE: 98 F | DIASTOLIC BLOOD PRESSURE: 62 MMHG | OXYGEN SATURATION: 95 % | HEIGHT: 70 IN | BODY MASS INDEX: 25.21 KG/M2 | SYSTOLIC BLOOD PRESSURE: 104 MMHG | WEIGHT: 176.13 LBS

## 2019-02-07 DIAGNOSIS — E11.69 TYPE 2 DIABETES MELLITUS WITH HYPERLIPIDEMIA: ICD-10-CM

## 2019-02-07 DIAGNOSIS — E78.5 TYPE 2 DIABETES MELLITUS WITH HYPERLIPIDEMIA: ICD-10-CM

## 2019-02-07 DIAGNOSIS — J40 BRONCHITIS: Primary | ICD-10-CM

## 2019-02-07 PROCEDURE — 99999 PR PBB SHADOW E&M-EST. PATIENT-LVL III: ICD-10-PCS | Mod: PBBFAC,HCNC,, | Performed by: NURSE PRACTITIONER

## 2019-02-07 PROCEDURE — 3045F PR MOST RECENT HEMOGLOBIN A1C LEVEL 7.0-9.0%: ICD-10-PCS | Mod: HCNC,CPTII,S$GLB, | Performed by: NURSE PRACTITIONER

## 2019-02-07 PROCEDURE — 3078F PR MOST RECENT DIASTOLIC BLOOD PRESSURE < 80 MM HG: ICD-10-PCS | Mod: HCNC,CPTII,S$GLB, | Performed by: NURSE PRACTITIONER

## 2019-02-07 PROCEDURE — 3045F PR MOST RECENT HEMOGLOBIN A1C LEVEL 7.0-9.0%: CPT | Mod: HCNC,CPTII,S$GLB, | Performed by: NURSE PRACTITIONER

## 2019-02-07 PROCEDURE — 99499 RISK ADDL DX/OHS AUDIT: ICD-10-PCS | Mod: HCNC,S$GLB,, | Performed by: NURSE PRACTITIONER

## 2019-02-07 PROCEDURE — 99214 OFFICE O/P EST MOD 30 MIN: CPT | Mod: HCNC,S$GLB,, | Performed by: NURSE PRACTITIONER

## 2019-02-07 PROCEDURE — 99214 PR OFFICE/OUTPT VISIT, EST, LEVL IV, 30-39 MIN: ICD-10-PCS | Mod: HCNC,S$GLB,, | Performed by: NURSE PRACTITIONER

## 2019-02-07 PROCEDURE — 1101F PT FALLS ASSESS-DOCD LE1/YR: CPT | Mod: HCNC,CPTII,S$GLB, | Performed by: NURSE PRACTITIONER

## 2019-02-07 PROCEDURE — 3074F PR MOST RECENT SYSTOLIC BLOOD PRESSURE < 130 MM HG: ICD-10-PCS | Mod: HCNC,CPTII,S$GLB, | Performed by: NURSE PRACTITIONER

## 2019-02-07 PROCEDURE — 99999 PR PBB SHADOW E&M-EST. PATIENT-LVL III: CPT | Mod: PBBFAC,HCNC,, | Performed by: NURSE PRACTITIONER

## 2019-02-07 PROCEDURE — 99499 UNLISTED E&M SERVICE: CPT | Mod: HCNC,S$GLB,, | Performed by: NURSE PRACTITIONER

## 2019-02-07 PROCEDURE — 3074F SYST BP LT 130 MM HG: CPT | Mod: HCNC,CPTII,S$GLB, | Performed by: NURSE PRACTITIONER

## 2019-02-07 PROCEDURE — 3078F DIAST BP <80 MM HG: CPT | Mod: HCNC,CPTII,S$GLB, | Performed by: NURSE PRACTITIONER

## 2019-02-07 PROCEDURE — 1101F PR PT FALLS ASSESS DOC 0-1 FALLS W/OUT INJ PAST YR: ICD-10-PCS | Mod: HCNC,CPTII,S$GLB, | Performed by: NURSE PRACTITIONER

## 2019-02-07 RX ORDER — PREDNISONE 10 MG/1
TABLET ORAL
Qty: 20 TABLET | Refills: 0 | Status: SHIPPED | OUTPATIENT
Start: 2019-02-07 | End: 2019-02-21 | Stop reason: ALTCHOICE

## 2019-02-07 NOTE — TELEPHONE ENCOUNTER
----- Message from Kendall Obregon sent at 2/7/2019 10:37 AM CST -----  Contact: pt  Type: Needs Medical Advice    Who Called:  pt  Symptoms (please be specific):  Congestion coughing  How long has patient had these symptoms:  Few weeks  Best Call Back Number: [phone]  Additional Information: has seen  dr chang got a shot and still having symptoms would like a call back to discuss

## 2019-02-07 NOTE — PROGRESS NOTES
Subjective:       Patient ID: Tres Peace is a 73 y.o. male.    Chief Complaint: Cough and Diabetic Foot Exam    Mr. Peace is a new patient to me. He presents today for cough     HPI   Symptoms ongoing for a few weeks. Saw NP Bo on 1/10/19 for this issue--xray clear, no pneumonia. Given steroids shot in office and Rx cheratussin. Reports persistent cough. Minimal mucus production--clear. Afebrile, denies sweats/chills. Denies upper respiratory issues.   Vitals:    02/07/19 1430   BP: 104/62   Pulse: 94   Temp: 98.2 °F (36.8 °C)     Review of Systems   Constitutional: Negative for diaphoresis and fever.   HENT: Negative for congestion, facial swelling, sinus pressure, sinus pain and trouble swallowing.    Eyes: Negative for discharge and redness.   Respiratory: Positive for cough and chest tightness. Negative for shortness of breath.    Cardiovascular: Negative for chest pain and palpitations.   Gastrointestinal: Negative for vomiting.   Genitourinary: Negative for difficulty urinating.   Musculoskeletal: Negative for gait problem.   Skin: Negative for pallor and rash.   Neurological: Negative for facial asymmetry and speech difficulty.   Psychiatric/Behavioral: Negative for confusion. The patient is not nervous/anxious.        Past Medical History:   Diagnosis Date    Basal cell carcinoma of cheek     BCC (basal cell carcinoma of skin)     Dr. Shaggy Ross, m[facility]s surgery    Diabetes mellitus type II     Diabetes with neurologic complications     GERD (gastroesophageal reflux disease)     Hyperlipidemia     Hypertension     Type II or unspecified type diabetes mellitus without mention of complication, not stated as uncontrolled 10/22/2012    Ulcerative colitis, chronic 10/22/2012    Sees Dr. Franco Herndon     Objective:      Physical Exam   Constitutional: He is oriented to person, place, and time. He does not have a sickly appearance. No distress.   HENT:   Head: Normocephalic.   Right Ear:  Hearing normal.   Left Ear: Hearing normal.   Nose: Nose normal.   Eyes: Conjunctivae and lids are normal.   Neck: No JVD present. No tracheal deviation present.   Cardiovascular: Normal rate and normal heart sounds.   Pulmonary/Chest: Effort normal and breath sounds normal.   Abdominal: He exhibits no distension.   Musculoskeletal: He exhibits no deformity.        Right foot: There is normal range of motion and no deformity.        Left foot: There is normal range of motion and no deformity.   Feet:   Right Foot:   Protective Sensation: 10 sites tested. 10 sites sensed.   Skin Integrity: Negative for ulcer, skin breakdown or warmth.   Left Foot:   Protective Sensation: 10 sites tested. 10 sites sensed.   Skin Integrity: Negative for ulcer, skin breakdown or warmth.   Neurological: He is alert and oriented to person, place, and time.   Skin: He is not diaphoretic. No pallor.   Psychiatric: He has a normal mood and affect. His speech is normal and behavior is normal. Judgment and thought content normal. Cognition and memory are normal.   Nursing note and vitals reviewed.      Assessment:       1. Bronchitis    2. Type 2 diabetes mellitus with hyperlipidemia        Plan:       Bronchitis  -     predniSONE (DELTASONE) 10 MG tablet; Take 4 tabs daily for 2 days, then 3 tabs daily for 2 days, then 2 tabs daily for 2 days, then 1 tab daily for 2 days, then stop  Dispense: 20 tablet; Refill: 0    Type 2 diabetes mellitus with hyperlipidemia   Educated on temporary elevation in glucose     educated on supportive care, return precautions (specifically bacterial symptoms)  Follow-up for further evaluation if s/s worsen, fail to improve, or new symptoms arise.    Medication List with Changes/Refills   New Medications    PREDNISONE (DELTASONE) 10 MG TABLET    Take 4 tabs daily for 2 days, then 3 tabs daily for 2 days, then 2 tabs daily for 2 days, then 1 tab daily for 2 days, then stop   Current Medications    ASPIRIN 81 MG  "CHEW    Take 1 tablet (81 mg total) by mouth once daily.    ATORVASTATIN (LIPITOR) 40 MG TABLET    Take 1 tablet (40 mg total) by mouth once daily.    BD ULTRA-FINE EMERSON PEN NEEDLE 32 GAUGE X 5/32" NDLE    USE WITH INSULIN EVERYDAY    BLOOD SUGAR DIAGNOSTIC (ACCU-CHEK CON PLUS TEST STRP) STRP    TEST DAILY.    GLIPIZIDE (GLUCOTROL) 10 MG TABLET    Take 1 tablet (10 mg total) by mouth once daily.    INSULIN GLARGINE (LANTUS SOLOSTAR U-100 INSULIN) 100 UNIT/ML (3 ML) INPN PEN    INJECT 18 UNITS INTO THE SKIN ONCE DAILY.    LOSARTAN (COZAAR) 100 MG TABLET    Take 1 tablet (100 mg total) by mouth once daily.    OMEPRAZOLE (PRILOSEC) 40 MG CAPSULE    TAKE 1 CAPSULE (40 MG TOTAL) BY MOUTH EVERY MORNING.    PEN NEEDLE, DIABETIC (BD ULTRA-FINE EMERSON PEN NEEDLE) 32 GAUGE X 5/32" NDLE    Test glucose once daily    PEN NEEDLE, DIABETIC (BD ULTRA-FINE EMERSON PEN NEEDLE) 32 GAUGE X 5/32" NDLE    USE WITH INSULIN EVERYDAY   Discontinued Medications    OMEPRAZOLE (PRILOSEC) 40 MG CAPSULE    Take 1 capsule (40 mg total) by mouth every morning.       "

## 2019-02-07 NOTE — TELEPHONE ENCOUNTER
Call placed to patient, same day appointment scheduled.     Patient reports persistent cough and congestion, without fever. Patient is having to sleep with extra pillows behind back to be able to sleep at night.

## 2019-02-21 ENCOUNTER — OFFICE VISIT (OUTPATIENT)
Dept: FAMILY MEDICINE | Facility: CLINIC | Age: 74
End: 2019-02-21
Payer: MEDICARE

## 2019-02-21 ENCOUNTER — TELEPHONE (OUTPATIENT)
Dept: FAMILY MEDICINE | Facility: CLINIC | Age: 74
End: 2019-02-21

## 2019-02-21 VITALS
OXYGEN SATURATION: 95 % | DIASTOLIC BLOOD PRESSURE: 80 MMHG | BODY MASS INDEX: 24.94 KG/M2 | TEMPERATURE: 98 F | SYSTOLIC BLOOD PRESSURE: 124 MMHG | WEIGHT: 174.19 LBS | HEIGHT: 70 IN | HEART RATE: 70 BPM

## 2019-02-21 DIAGNOSIS — B96.89 BACTERIAL SINUSITIS: Primary | ICD-10-CM

## 2019-02-21 DIAGNOSIS — J32.9 BACTERIAL SINUSITIS: Primary | ICD-10-CM

## 2019-02-21 PROCEDURE — 3079F PR MOST RECENT DIASTOLIC BLOOD PRESSURE 80-89 MM HG: ICD-10-PCS | Mod: HCNC,CPTII,S$GLB, | Performed by: PHYSICIAN ASSISTANT

## 2019-02-21 PROCEDURE — 3079F DIAST BP 80-89 MM HG: CPT | Mod: HCNC,CPTII,S$GLB, | Performed by: PHYSICIAN ASSISTANT

## 2019-02-21 PROCEDURE — 1101F PR PT FALLS ASSESS DOC 0-1 FALLS W/OUT INJ PAST YR: ICD-10-PCS | Mod: HCNC,CPTII,S$GLB, | Performed by: PHYSICIAN ASSISTANT

## 2019-02-21 PROCEDURE — 99214 PR OFFICE/OUTPT VISIT, EST, LEVL IV, 30-39 MIN: ICD-10-PCS | Mod: HCNC,S$GLB,, | Performed by: PHYSICIAN ASSISTANT

## 2019-02-21 PROCEDURE — 99999 PR PBB SHADOW E&M-EST. PATIENT-LVL III: CPT | Mod: PBBFAC,HCNC,, | Performed by: PHYSICIAN ASSISTANT

## 2019-02-21 PROCEDURE — 3074F PR MOST RECENT SYSTOLIC BLOOD PRESSURE < 130 MM HG: ICD-10-PCS | Mod: HCNC,CPTII,S$GLB, | Performed by: PHYSICIAN ASSISTANT

## 2019-02-21 PROCEDURE — 99214 OFFICE O/P EST MOD 30 MIN: CPT | Mod: HCNC,S$GLB,, | Performed by: PHYSICIAN ASSISTANT

## 2019-02-21 PROCEDURE — 3074F SYST BP LT 130 MM HG: CPT | Mod: HCNC,CPTII,S$GLB, | Performed by: PHYSICIAN ASSISTANT

## 2019-02-21 PROCEDURE — 1101F PT FALLS ASSESS-DOCD LE1/YR: CPT | Mod: HCNC,CPTII,S$GLB, | Performed by: PHYSICIAN ASSISTANT

## 2019-02-21 PROCEDURE — 99999 PR PBB SHADOW E&M-EST. PATIENT-LVL III: ICD-10-PCS | Mod: PBBFAC,HCNC,, | Performed by: PHYSICIAN ASSISTANT

## 2019-02-21 RX ORDER — AMOXICILLIN AND CLAVULANATE POTASSIUM 875; 125 MG/1; MG/1
1 TABLET, FILM COATED ORAL 2 TIMES DAILY
Qty: 14 TABLET | Refills: 0 | Status: SHIPPED | OUTPATIENT
Start: 2019-02-21 | End: 2019-02-28

## 2019-02-21 NOTE — TELEPHONE ENCOUNTER
----- Message from Kay Stephenson sent at 2/21/2019  7:38 AM CST -----  Contact: patient  Type: Needs Medical Advice    Who Called:  patient  Symptoms (please be specific):  Congestion,cough,running nose  How long has patient had these symptoms:    Pharmacy name and phone #:  [facility]  Best Call Back Number: [phone]  Additional Information: Requesting a call back need to  Know what to do? Or if he needs to come back in for another visit

## 2019-02-21 NOTE — PROGRESS NOTES
"Subjective:      Patient ID: Tres Peace is a 73 y.o. male.    Chief Complaint: URI    Patient is new to me, here today for URI symptoms   Patient states he was seen on 1/10 and 2/7 for similar symptoms, diagnosed with bronchitis at that time and given oral and IM steroids, cough medicine   CXR in January unremarkable  Patient states no improvement in that time and now complaints of sinus pressure/congestion    Yesterday was out in the rain and sneezing/allergies also became aggravated      URI    This is a new problem. The current episode started 1 to 4 weeks ago. The problem has been gradually worsening. Associated symptoms include congestion, coughing (productive), rhinorrhea and sinus pain. Pertinent negatives include no abdominal pain, diarrhea, headaches, nausea, rash, sore throat, vomiting or wheezing (improving). Treatments tried: steroids, flonase, Rx cough. The treatment provided no relief.     Review of Systems   Constitutional: Negative for chills, diaphoresis and fever.   HENT: Positive for congestion, rhinorrhea, sinus pressure and sinus pain. Negative for sore throat.    Respiratory: Positive for cough (productive). Negative for shortness of breath and wheezing (improving).    Gastrointestinal: Negative for abdominal pain, constipation, diarrhea, nausea and vomiting.   Skin: Negative for rash.   Neurological: Negative for dizziness, light-headedness and headaches.       Objective:   /80 (BP Location: Left arm, Patient Position: Sitting, BP Method: Medium (Manual))   Pulse 70   Temp 98.1 °F (36.7 °C) (Oral)   Ht 5' 10" (1.778 m)   Wt 79 kg (174 lb 2.6 oz)   SpO2 95%   BMI 24.99 kg/m²   Physical Exam   Constitutional: He appears well-developed and well-nourished. He does not appear ill. No distress.   HENT:   Head: Normocephalic and atraumatic.   Right Ear: Tympanic membrane and ear canal normal. Tympanic membrane is not erythematous. No middle ear effusion.   Left Ear: Tympanic membrane " and ear canal normal. Tympanic membrane is not erythematous.  No middle ear effusion.   Nose: Mucosal edema and rhinorrhea present. Right sinus exhibits maxillary sinus tenderness and frontal sinus tenderness. Left sinus exhibits maxillary sinus tenderness and frontal sinus tenderness.   Mouth/Throat: Uvula is midline and oropharynx is clear and moist. No posterior oropharyngeal erythema.   Signs of PND   Cardiovascular: Normal rate, regular rhythm and normal heart sounds.   No murmur heard.  Pulmonary/Chest: Effort normal and breath sounds normal. No respiratory distress. He has no decreased breath sounds. He has no wheezes. He has no rhonchi. He has no rales.   Lymphadenopathy:     He has no cervical adenopathy.   Skin: Skin is warm and dry. No rash noted. He is not diaphoretic.   Psychiatric: He has a normal mood and affect. His speech is normal and behavior is normal. Thought content normal. Cognition and memory are normal.     Assessment:      1. Bacterial sinusitis       Plan:   Bacterial sinusitis  -     amoxicillin-clavulanate 875-125mg (AUGMENTIN) 875-125 mg per tablet; Take 1 tablet by mouth 2 (two) times daily. for 7 days  Dispense: 14 tablet; Refill: 0    Gave handout on sinusitis.  Printed AVS and reviewed treatment plan in detail.    Discussed worsening signs/symptoms and when to return to clinic or go to ED.   Patient expresses understanding and agrees with treatment plan.

## 2019-02-21 NOTE — PATIENT INSTRUCTIONS
Sinusitis (Antibiotic Treatment)    The sinuses are air-filled spaces within the bones of the face. They connect to the inside of the nose. Sinusitis is an inflammation of the tissue lining the sinus cavity. Sinus inflammation can occur during a cold. It can also be due to allergies to pollens and other particles in the air. Sinusitis can cause symptoms of sinus congestion and fullness. A sinus infection causes fever, headache and facial pain. There is often green or yellow drainage from the nose or into the back of the throat (post-nasal drip). You have been given antibiotics to treat this condition.  Home care:  · Take the full course of antibiotics as instructed. Do not stop taking them, even if you feel better.  · Drink plenty of water, hot tea, and other liquids. This may help thin mucus. It also may promote sinus drainage.  · Heat may help soothe painful areas of the face. Use a towel soaked in hot water. Or,  the shower and direct the hot spray onto your face. Using a vaporizer along with a menthol rub at night may also help.   · An expectorant containing guaifenesin may help thin the mucus and promote drainage from the sinuses.  · Over-the-counter decongestants may be used unless a similar medicine was prescribed. Nasal sprays work the fastest. Use one that contains phenylephrine or oxymetazoline. First blow the nose gently. Then use the spray. Do not use these medicines more often than directed on the label or symptoms may get worse. You may also use tablets containing pseudoephedrine. Avoid products that combine ingredients, because side effects may be increased. Read labels. You can also ask the pharmacist for help. (NOTE: Persons with high blood pressure should not use decongestants. They can raise blood pressure.)  · Over-the-counter antihistamines may help if allergies contributed to your sinusitis.    · Do not use nasal rinses or irrigation during an acute sinus infection, unless told to by  your health care provider. Rinsing may spread the infection to other sinuses.  · Use acetaminophen or ibuprofen to control pain, unless another pain medicine was prescribed. (If you have chronic liver or kidney disease or ever had a stomach ulcer, talk with your doctor before using these medicines. Aspirin should never be used in anyone under 18 years of age who is ill with a fever. It may cause severe liver damage.)  · Don't smoke. This can worsen symptoms.  Follow-up care  Follow up with your healthcare provider or our staff if you are not improving within the next week.  When to seek medical advice  Call your healthcare provider if any of these occur:  · Facial pain or headache becoming more severe  · Stiff neck  · Unusual drowsiness or confusion  · Swelling of the forehead or eyelids  · Vision problems, including blurred or double vision  · Fever of 100.4ºF (38ºC) or higher, or as directed by your healthcare provider  · Seizure  · Breathing problems  · Symptoms not resolving within 10 days  Date Last Reviewed: 4/13/2015  © 1516-4969 [employer]. [address] [place] [address]. All rights reserved. This information is not intended as a substitute for professional medical care. Always follow your healthcare professional's instructions.    Rest.  Use warm compresses on sinuses for relief several times a day.  Increase fluid intake to at least 64 ounces of water per day to keep secretions thin and loose.  Normal saline nasal wash or Flonase to irrigate sinuses and for congestion/runny nose.  Use a cool mist vaporizer or humidifier in home to help relieve congestion.    Practice good handwashing.  Zyrtec-D or Claritin-D to help dry mucus and post nasal drip.  Mucinex or Mucinex DM for cough and chest congestion.  Avoid decongestants (Sudafed,Mucinex-D,Claritin-D, etc) if you have hypertension.  Tylenol or Ibuprofen for fever, headache and body aches.  Warm salt water gargles and lozenges for  throat comfort.  Chloraseptic spray or lozenges for throat comfort.  See PCP if symptoms fail to improve.   Go to ER if you develop fever of 103 or higher, chest pain, shortness of breath, upper back pain, stiff neck or severe headache.

## 2019-02-21 NOTE — TELEPHONE ENCOUNTER
Spoke with pt. Instructed that an appointment is needed, as he last saw Bhavna 2/7.  Verbalized understanding.  Appt with Shanae Deng scheduled.

## 2019-02-25 ENCOUNTER — TELEPHONE (OUTPATIENT)
Dept: FAMILY MEDICINE | Facility: CLINIC | Age: 74
End: 2019-02-25

## 2019-02-25 RX ORDER — BENZONATATE 200 MG/1
200 CAPSULE ORAL 3 TIMES DAILY PRN
Qty: 30 CAPSULE | Refills: 1 | Status: SHIPPED | OUTPATIENT
Start: 2019-02-25 | End: 2019-03-07

## 2019-02-25 NOTE — TELEPHONE ENCOUNTER
----- Message from Kendall Obregon sent at 2/25/2019 11:08 AM CST -----  Contact: pt  Type:  Sooner Apoointment Request    Caller is requesting a sooner appointment.  Caller declined first available appointment listed below.  Caller will not accept being placed on the waitlist and is requesting a message be sent to doctor.    Name of Caller:  pt  When is the first available appointment?  None showing  Symptoms:  Follow up on a cold  Best Call Back Number:  [phone]  Additional Information:

## 2019-02-25 NOTE — TELEPHONE ENCOUNTER
"Patient requesting appointment with Dr. Torres for URI symptoms not relieved x2 months per patient. Patient reports cough, congestion and hemopytis despite taking Augmentin prescribed 2/21/19.     Patient states he's seen several providers with improvement but no relief of "lingering symptoms". "I've seen two nurse practitioners and one PA for the same thing and I want to see my doctor to see if he has any better recommendations" Pt declined to schedule with anyone else at this time. Please advise.    "

## 2019-02-28 ENCOUNTER — TELEPHONE (OUTPATIENT)
Dept: FAMILY MEDICINE | Facility: CLINIC | Age: 74
End: 2019-02-28

## 2019-02-28 DIAGNOSIS — E11.69 TYPE 2 DIABETES MELLITUS WITH HYPERLIPIDEMIA: ICD-10-CM

## 2019-02-28 DIAGNOSIS — E11.59 HYPERTENSION ASSOCIATED WITH DIABETES: ICD-10-CM

## 2019-02-28 DIAGNOSIS — I15.2 HYPERTENSION ASSOCIATED WITH DIABETES: ICD-10-CM

## 2019-02-28 DIAGNOSIS — R05.3 CHRONIC COUGH: Primary | ICD-10-CM

## 2019-02-28 DIAGNOSIS — E78.5 TYPE 2 DIABETES MELLITUS WITH HYPERLIPIDEMIA: ICD-10-CM

## 2019-03-01 RX ORDER — GLIPIZIDE 10 MG/1
10 TABLET ORAL DAILY
Qty: 90 TABLET | Refills: 0 | Status: SHIPPED | OUTPATIENT
Start: 2019-03-01 | End: 2019-05-22 | Stop reason: SDUPTHER

## 2019-03-06 ENCOUNTER — PATIENT MESSAGE (OUTPATIENT)
Dept: FAMILY MEDICINE | Facility: CLINIC | Age: 74
End: 2019-03-06

## 2019-03-19 DIAGNOSIS — E78.5 TYPE 2 DIABETES MELLITUS WITH HYPERLIPIDEMIA: ICD-10-CM

## 2019-03-19 DIAGNOSIS — E11.69 TYPE 2 DIABETES MELLITUS WITH HYPERLIPIDEMIA: ICD-10-CM

## 2019-03-19 DIAGNOSIS — E11.59 HYPERTENSION ASSOCIATED WITH DIABETES: ICD-10-CM

## 2019-03-19 DIAGNOSIS — I15.2 HYPERTENSION ASSOCIATED WITH DIABETES: ICD-10-CM

## 2019-03-20 RX ORDER — OMEPRAZOLE 40 MG/1
CAPSULE, DELAYED RELEASE ORAL
Qty: 90 CAPSULE | Refills: 0 | Status: SHIPPED | OUTPATIENT
Start: 2019-03-20 | End: 2019-04-03 | Stop reason: SDUPTHER

## 2019-03-20 RX ORDER — GLIPIZIDE 10 MG/1
TABLET ORAL
Qty: 90 TABLET | Refills: 0 | Status: SHIPPED | OUTPATIENT
Start: 2019-03-20 | End: 2019-04-03 | Stop reason: SDUPTHER

## 2019-04-03 ENCOUNTER — NURSE TRIAGE (OUTPATIENT)
Dept: ADMINISTRATIVE | Facility: CLINIC | Age: 74
End: 2019-04-03

## 2019-04-03 ENCOUNTER — OFFICE VISIT (OUTPATIENT)
Dept: FAMILY MEDICINE | Facility: CLINIC | Age: 74
End: 2019-04-03
Payer: MEDICARE

## 2019-04-03 ENCOUNTER — TELEPHONE (OUTPATIENT)
Dept: FAMILY MEDICINE | Facility: CLINIC | Age: 74
End: 2019-04-03

## 2019-04-03 VITALS
BODY MASS INDEX: 25.56 KG/M2 | SYSTOLIC BLOOD PRESSURE: 146 MMHG | HEART RATE: 57 BPM | WEIGHT: 178.56 LBS | DIASTOLIC BLOOD PRESSURE: 88 MMHG | HEIGHT: 70 IN

## 2019-04-03 DIAGNOSIS — I10 ESSENTIAL HYPERTENSION: Primary | ICD-10-CM

## 2019-04-03 DIAGNOSIS — R05.9 COUGH: ICD-10-CM

## 2019-04-03 PROCEDURE — 99214 OFFICE O/P EST MOD 30 MIN: CPT | Mod: HCNC,S$GLB,, | Performed by: NURSE PRACTITIONER

## 2019-04-03 PROCEDURE — 99999 PR PBB SHADOW E&M-EST. PATIENT-LVL IV: CPT | Mod: PBBFAC,HCNC,, | Performed by: NURSE PRACTITIONER

## 2019-04-03 PROCEDURE — 3079F DIAST BP 80-89 MM HG: CPT | Mod: HCNC,CPTII,S$GLB, | Performed by: NURSE PRACTITIONER

## 2019-04-03 PROCEDURE — 3079F PR MOST RECENT DIASTOLIC BLOOD PRESSURE 80-89 MM HG: ICD-10-PCS | Mod: HCNC,CPTII,S$GLB, | Performed by: NURSE PRACTITIONER

## 2019-04-03 PROCEDURE — 99214 PR OFFICE/OUTPT VISIT, EST, LEVL IV, 30-39 MIN: ICD-10-PCS | Mod: HCNC,S$GLB,, | Performed by: NURSE PRACTITIONER

## 2019-04-03 PROCEDURE — 1101F PT FALLS ASSESS-DOCD LE1/YR: CPT | Mod: HCNC,CPTII,S$GLB, | Performed by: NURSE PRACTITIONER

## 2019-04-03 PROCEDURE — 1101F PR PT FALLS ASSESS DOC 0-1 FALLS W/OUT INJ PAST YR: ICD-10-PCS | Mod: HCNC,CPTII,S$GLB, | Performed by: NURSE PRACTITIONER

## 2019-04-03 PROCEDURE — 3077F SYST BP >= 140 MM HG: CPT | Mod: HCNC,CPTII,S$GLB, | Performed by: NURSE PRACTITIONER

## 2019-04-03 PROCEDURE — 3077F PR MOST RECENT SYSTOLIC BLOOD PRESSURE >= 140 MM HG: ICD-10-PCS | Mod: HCNC,CPTII,S$GLB, | Performed by: NURSE PRACTITIONER

## 2019-04-03 PROCEDURE — 99999 PR PBB SHADOW E&M-EST. PATIENT-LVL IV: ICD-10-PCS | Mod: PBBFAC,HCNC,, | Performed by: NURSE PRACTITIONER

## 2019-04-03 RX ORDER — FLUTICASONE FUROATE AND VILANTEROL TRIFENATATE 100; 25 UG/1; UG/1
1 POWDER RESPIRATORY (INHALATION) EVERY OTHER DAY
COMMUNITY
Start: 2019-03-28 | End: 2020-05-26

## 2019-04-03 NOTE — PROGRESS NOTES
Subjective:       Patient ID: Tres Peace is a 73 y.o. male.    Chief Complaint: Hypertension     That is the only medication change. He is on losartan 100 mg (he takes 2 50 mg pills). Bp was normal at last visit in February. He says he checked his Bp because he was going to the dentist today and found Bp to be elevated at 160/80, he thought he may not have taken both of his losartan so he took an extra 50 mg at 12;40. No symptoms that are out of the ordinary. He has had a cough for some time, he was placed on breo about a month ago by pulmonology. He says the cough is about 90 percent improved, but not resolved, he says he was supposed to be on breo for about 2 more weeks. He says his Bp has not been checked since starting the Breo.   There are no preventive care reminders to display for this patient.    Past Medical History:  Past Medical History:  No date: Basal cell carcinoma of cheek  No date: BCC (basal cell carcinoma of skin)      Comment:  Dr. Shaggy Ross, mohs surgery  No date: Diabetes mellitus type II  No date: Diabetes with neurologic complications  No date: GERD (gastroesophageal reflux disease)  No date: Hyperlipidemia  No date: Hypertension  10/22/2012: Type II or unspecified type diabetes mellitus without   mention of complication, not stated as uncontrolled  10/22/2012: Ulcerative colitis, chronic      Comment:  Sees Dr. Franco Herndon  Past Surgical History:  6/23/2016: COLONOSCOPY; N/A      Comment:  Performed by Arnoldo Carvalho Jr., MD at [facility]  7/18/2013: COLONOSCOPY; N/A      Comment:  Performed by Arnoldo Carvalho Jr., MD at [facility] ENDO  12/20/2010: COLONOSCOPY W/ BIOPSIES      Comment:  CHIRAG.   Mod to severe inflammation throughout the                colon, c/w ulcerative colitis.  07/18/2013: COLONOSCOPY W/ BIOPSIES      Comment:  ELIZABETH.   Post-ulcerative colitis scars in the entire                colon.  Pseudopolyps from the descending to the ascending               colon.  Sigmoid  "diverticulosis.  Internal hemorrhoids.  05/03/2012: COLONOSCOPY W/ POLYPECTOMY      Comment:  CHIRAG.   One 5 mm polyp in the transverse colon.                 JUVENILE POLYP.  Sigmoid diverticulosis.  No active                inflammation. but pseudopolyps and mucosal scarring.    7/18/2013: EGD (ESOPHAGOGASTRODUODENOSCOPY); N/A      Comment:  Performed by Arnoldo Carvalho Jr., MD at [facility] ENDO  12/20/2010: ESOPHAGOGASTRODUODENOSCOPY      Comment:  CHIRAG.   Normal esophagua.  Granular mucosa in the                stomach.  Gastritis.  4 mm duodenal bulb ulcer.    05/03/2012: ESOPHAGOGASTRODUODENOSCOPY      Comment:  CHIRAG.   Grade A reflux esophagitis.  Small hiatal                hernia.  Gastritis.  4 mm duodenal bulb ulcer.  8 mm duod               ulcer 2nd part. Duod stenosis 1st part duod.  07/18/2013: ESOPHAGOGASTRODUODENOSCOPY      Comment:  ELIZABETH.   Reflux esophagitis. Gastritis/antritis. CLOtest               negative.  Erythematous mucosa was found in the duodenal                bulb.  Duodenal diverticulum.    No date: SKIN BIOPSY      Comment:  skin CA removal  No date: TONSILLECTOMY  Review of patient's allergies indicates:   -- Compazine (prochlorperazine edisylate) -- Swelling    --  Distortion of facial muscles and severe swelling  Current Outpatient Medications on File Prior to Visit:  aspirin 81 MG Chew, Take 1 tablet (81 mg total) by mouth once daily., Disp: , Rfl:   atorvastatin (LIPITOR) 40 MG tablet, Take 1 tablet (40 mg total) by mouth once daily., Disp: 90 tablet, Rfl: 3  BD ULTRA-FINE EMERSON PEN NEEDLE 32 gauge x 5/32" Ndle, USE WITH INSULIN EVERYDAY, Disp: 100 each, Rfl: 2  blood sugar diagnostic (ACCU-CHEK CON PLUS TEST STRP) Strp, TEST DAILY., Disp: 100 strip, Rfl: 1  BREO ELLIPTA 100-25 mcg/dose diskus inhaler, , Disp: , Rfl:   glipiZIDE (GLUCOTROL) 10 MG tablet, Take 1 tablet (10 mg total) by mouth once daily., Disp: 90 tablet, Rfl: 0  insulin glargine (LANTUS SOLOSTAR U-100 " "INSULIN) 100 unit/mL (3 mL) InPn pen, INJECT 18 UNITS INTO THE SKIN ONCE DAILY., Disp: 45 Syringe, Rfl: 0  losartan (COZAAR) 100 MG tablet, Take 1 tablet (100 mg total) by mouth once daily., Disp: 90 tablet, Rfl: 3  omeprazole (PRILOSEC) 40 MG capsule, TAKE 1 CAPSULE (40 MG TOTAL) BY MOUTH EVERY MORNING., Disp: 90 capsule, Rfl: 0  pen needle, diabetic (BD ULTRA-FINE EMERSON PEN NEEDLE) 32 gauge x 5/32" Ndle, Test glucose once daily, Disp: 100 each, Rfl: 11  (DISCONTINUED) glipiZIDE (GLUCOTROL) 10 MG tablet, TAKE 1 TABLET EVERY DAY, Disp: 90 tablet, Rfl: 0  (DISCONTINUED) omeprazole (PRILOSEC) 40 MG capsule, TAKE 1 CAPSULE EVERY MORNING, Disp: 90 capsule, Rfl: 0  (DISCONTINUED) pen needle, diabetic (BD ULTRA-FINE EMERSON PEN NEEDLE) 32 gauge x 5/32" Ndle, USE WITH INSULIN EVERYDAY, Disp: 100 each, Rfl: 2    No current facility-administered medications on file prior to visit.     Social History    Socioeconomic History      Marital status:       Spouse name: Not on file      Number of children: 2      Years of education: Not on file      Highest education level: Not on file    Occupational History      Not on file    Social Needs      Financial resource strain: Not on file      Food insecurity:        Worry: Not on file        Inability: Not on file      Transportation needs:        Medical: Not on file        Non-medical: Not on file    Tobacco Use      Smoking status: Never Smoker      Smokeless tobacco: Never Used    Substance and Sexual Activity      Alcohol use: Yes        Comment: 1 drink every other day      Drug use: No      Sexual activity: Yes        Partners: Female    Lifestyle      Physical activity:        Days per week: Not on file        Minutes per session: Not on file      Stress: Not on file    Relationships      Social connections:        Talks on phone: Not on file        Gets together: Not on file        Attends Mandaen service: Not on file        Active member of club or organization: Not on " file        Attends meetings of clubs or organizations: Not on file        Relationship status: Not on file    Other Topics      Concerns:        Not on file    Social History Narrative      Not on file    Review of patient's family history indicates:  Problem: Cancer      Relation: Mother          Age of Onset: 84          Comment: breast ca  Problem: Stroke      Relation: Father          Age of Onset: (Not Specified)  Problem: Heart disease      Relation: Brother          Age of Onset: (Not Specified)  Problem: Heart disease      Relation: Sister          Age of Onset: (Not Specified)          Review of Systems   Constitutional: Negative.  Negative for chills and fever.   HENT: Negative.    Respiratory: Positive for cough.    Cardiovascular: Negative for chest pain and leg swelling.   Genitourinary: Negative.    Neurological: Negative.  Negative for dizziness, syncope, speech difficulty, weakness, light-headedness, numbness and headaches.   Psychiatric/Behavioral: Negative.        Objective:      Physical Exam   Constitutional: He is oriented to person, place, and time. No distress.   HENT:   Head: Normocephalic and atraumatic.   Eyes: Pupils are equal, round, and reactive to light.   Cardiovascular: Normal rate and regular rhythm.   No murmur heard.  Pulmonary/Chest: Effort normal and breath sounds normal. No stridor. No respiratory distress.   Musculoskeletal: He exhibits no edema.   Neurological: He is alert and oriented to person, place, and time.   Skin: He is not diaphoretic.   Psychiatric: He has a normal mood and affect. His behavior is normal.   Vitals reviewed.      Assessment:       1. Essential hypertension    2. Cough        Plan:       1. Essential hypertension  Stop breo. Continue losartan 100 mg daily. Recheck BP on Monday, scheudled. Will need adjustment if Bp does not normalize off Breo.     2. Cough  Stop breo, contact pulmonology to let them know Bp elevation has developed on Breo and ask for  other recommendation.

## 2019-04-03 NOTE — TELEPHONE ENCOUNTER
----- Message from Mary Beth Sánchez sent at 4/3/2019 12:27 PM CDT -----  Contact: patient  Type: Needs Medical Advice    Who Called:  patient  Best Call Back Number: [phone]  Additional Information: patient states that he checked his blood pressure today and it was 160/114. He wants to know what to do. Please advise

## 2019-04-03 NOTE — TELEPHONE ENCOUNTER
Rec'd call from Andressa Atkins, nurse from [facility] in [place], states she hasn't spoken with the patient, but she got a message about his bp being elevated, and asked me to call him.  I contacted the patient, and his bp was 162/114 at 1230, has been taking 100 mg of losartan, could be that he only took on 50 mg this morning, by accident, so he took another 50 mg losartan tablet.  His blood pressure is down now.  His repeat reading, from 5 minutes ago was  131/83.  He is asymptomatic.    He has been having a cough for a long time, he's been on Breo, and is wondering if the blood pressure is elevated due to that.  I don't see it listed in his med list, but he states he's been on it for approximately 1 month now.  appt made for today with CANDACE Cassidy.    Reason for Disposition   Systolic BP >= 180 OR Diastolic >= 110    Protocols used: HIGH BLOOD PRESSURE-A-OH

## 2019-04-04 NOTE — TELEPHONE ENCOUNTER
Phoned pt in regards to message per Dr Torres. Pt states he saw Jeanne Cassidy yesterday, 4/3/19 and is already scheduled for a nurse visit. Instructed pt to bring in home cuff to appt. Pt voiced understanding. CLC

## 2019-04-08 ENCOUNTER — CLINICAL SUPPORT (OUTPATIENT)
Dept: FAMILY MEDICINE | Facility: CLINIC | Age: 74
End: 2019-04-08
Payer: MEDICARE

## 2019-04-08 VITALS — DIASTOLIC BLOOD PRESSURE: 89 MMHG | SYSTOLIC BLOOD PRESSURE: 129 MMHG

## 2019-04-08 DIAGNOSIS — I10 ESSENTIAL HYPERTENSION: Primary | ICD-10-CM

## 2019-04-08 PROCEDURE — 99999 PR PBB SHADOW E&M-EST. PATIENT-LVL I: CPT | Mod: PBBFAC,HCNC,,

## 2019-04-08 PROCEDURE — 99999 PR PBB SHADOW E&M-EST. PATIENT-LVL I: ICD-10-PCS | Mod: PBBFAC,HCNC,,

## 2019-04-08 NOTE — PROGRESS NOTES
"Tres Peace 73 y.o. male is here today for Blood Pressure check.   History of HTN yes.    Review of patient's allergies indicates:   Allergen Reactions    Compazine [prochlorperazine edisylate] Swelling     Distortion of facial muscles and severe swelling     Creatinine   Date Value Ref Range Status   11/20/2018 0.9 0.5 - 1.4 mg/dL Final     Sodium   Date Value Ref Range Status   11/20/2018 138 136 - 145 mmol/L Final     Potassium   Date Value Ref Range Status   11/20/2018 4.7 3.5 - 5.1 mmol/L Final   ]  Patient verifies taking blood pressure medications on a regular basis at the same time of the day.     Current Outpatient Medications:     aspirin 81 MG Chew, Take 1 tablet (81 mg total) by mouth once daily., Disp: , Rfl:     atorvastatin (LIPITOR) 40 MG tablet, Take 1 tablet (40 mg total) by mouth once daily., Disp: 90 tablet, Rfl: 3    BD ULTRA-FINE EMERSON PEN NEEDLE 32 gauge x 5/32" Ndle, USE WITH INSULIN EVERYDAY, Disp: 100 each, Rfl: 2    blood sugar diagnostic (ACCU-CHEK CON PLUS TEST STRP) Strp, TEST DAILY., Disp: 100 strip, Rfl: 1    BREO ELLIPTA 100-25 mcg/dose diskus inhaler, , Disp: , Rfl:     glipiZIDE (GLUCOTROL) 10 MG tablet, Take 1 tablet (10 mg total) by mouth once daily., Disp: 90 tablet, Rfl: 0    insulin glargine (LANTUS SOLOSTAR U-100 INSULIN) 100 unit/mL (3 mL) InPn pen, INJECT 18 UNITS INTO THE SKIN ONCE DAILY., Disp: 45 Syringe, Rfl: 0    losartan (COZAAR) 100 MG tablet, Take 1 tablet (100 mg total) by mouth once daily., Disp: 90 tablet, Rfl: 3    omeprazole (PRILOSEC) 40 MG capsule, TAKE 1 CAPSULE (40 MG TOTAL) BY MOUTH EVERY MORNING., Disp: 90 capsule, Rfl: 0    pen needle, diabetic (BD ULTRA-FINE EMERSON PEN NEEDLE) 32 gauge x 5/32" Ndle, Test glucose once daily, Disp: 100 each, Rfl: 11  Does patient have record of home blood pressure readings no. Readings have been averaging n/a.   Last dose of blood pressure medication was taken at 4/8/19 am .  Patient is asymptomatic. "   Complains of no complaints.    BP: 132/82 ,   .    Blood pressure reading after 15 minutes was 128/78, Pulse 56.  Dr. Torres notified.

## 2019-05-20 ENCOUNTER — PATIENT OUTREACH (OUTPATIENT)
Dept: ADMINISTRATIVE | Facility: HOSPITAL | Age: 74
End: 2019-05-20

## 2019-05-20 NOTE — PROGRESS NOTES
Health Maintenance Due   Topic Date Due    Shingles Vaccine (1 of 2) 12/07/1996    Hemoglobin A1c  05/20/2019   Chart review complete/scrubbed 05/20/2019  Future Appointments   Date Time Provider Department Center   5/27/2019  7:30 AM URINE [facility] [place]   5/27/2019  8:00 AM LABGANESH [facility] [place]   6/3/2019  9:00 AM LONI Torres MD [facility]     Digital Medicine Outreach.

## 2019-05-22 DIAGNOSIS — I15.2 HYPERTENSION ASSOCIATED WITH DIABETES: ICD-10-CM

## 2019-05-22 DIAGNOSIS — E11.69 TYPE 2 DIABETES MELLITUS WITH HYPERLIPIDEMIA: ICD-10-CM

## 2019-05-22 DIAGNOSIS — E11.59 HYPERTENSION ASSOCIATED WITH DIABETES: ICD-10-CM

## 2019-05-22 DIAGNOSIS — E78.5 TYPE 2 DIABETES MELLITUS WITH HYPERLIPIDEMIA: ICD-10-CM

## 2019-05-23 RX ORDER — GLIPIZIDE 10 MG/1
TABLET ORAL
Qty: 90 TABLET | Refills: 0 | Status: SHIPPED | OUTPATIENT
Start: 2019-05-23 | End: 2019-10-16 | Stop reason: SDUPTHER

## 2019-05-28 ENCOUNTER — LAB VISIT (OUTPATIENT)
Dept: LAB | Facility: HOSPITAL | Age: 74
End: 2019-05-28
Attending: FAMILY MEDICINE
Payer: MEDICARE

## 2019-05-28 DIAGNOSIS — E11.69 TYPE 2 DIABETES MELLITUS WITH HYPERLIPIDEMIA: ICD-10-CM

## 2019-05-28 DIAGNOSIS — Z12.5 SCREENING PSA (PROSTATE SPECIFIC ANTIGEN): ICD-10-CM

## 2019-05-28 DIAGNOSIS — I10 ESSENTIAL HYPERTENSION: ICD-10-CM

## 2019-05-28 DIAGNOSIS — E78.5 TYPE 2 DIABETES MELLITUS WITH HYPERLIPIDEMIA: ICD-10-CM

## 2019-05-28 DIAGNOSIS — E78.2 MIXED HYPERLIPIDEMIA: ICD-10-CM

## 2019-05-28 LAB
ALBUMIN SERPL BCP-MCNC: 4 G/DL (ref 3.5–5.2)
ALP SERPL-CCNC: 98 U/L (ref 55–135)
ALT SERPL W/O P-5'-P-CCNC: 18 U/L (ref 10–44)
ANION GAP SERPL CALC-SCNC: 8 MMOL/L (ref 8–16)
AST SERPL-CCNC: 19 U/L (ref 10–40)
BASOPHILS # BLD AUTO: 0.06 K/UL (ref 0–0.2)
BASOPHILS NFR BLD: 0.7 % (ref 0–1.9)
BILIRUB SERPL-MCNC: 1 MG/DL (ref 0.1–1)
BUN SERPL-MCNC: 15 MG/DL (ref 8–23)
CALCIUM SERPL-MCNC: 9.9 MG/DL (ref 8.7–10.5)
CHLORIDE SERPL-SCNC: 102 MMOL/L (ref 95–110)
CHOLEST SERPL-MCNC: 176 MG/DL (ref 120–199)
CHOLEST/HDLC SERPL: 4 {RATIO} (ref 2–5)
CO2 SERPL-SCNC: 27 MMOL/L (ref 23–29)
COMPLEXED PSA SERPL-MCNC: 4.8 NG/ML (ref 0–4)
CREAT SERPL-MCNC: 1 MG/DL (ref 0.5–1.4)
DIFFERENTIAL METHOD: NORMAL
EOSINOPHIL # BLD AUTO: 0.2 K/UL (ref 0–0.5)
EOSINOPHIL NFR BLD: 2.6 % (ref 0–8)
ERYTHROCYTE [DISTWIDTH] IN BLOOD BY AUTOMATED COUNT: 13.1 % (ref 11.5–14.5)
EST. GFR  (AFRICAN AMERICAN): >60 ML/MIN/1.73 M^2
EST. GFR  (NON AFRICAN AMERICAN): >60 ML/MIN/1.73 M^2
ESTIMATED AVG GLUCOSE: 166 MG/DL (ref 68–131)
GLUCOSE SERPL-MCNC: 120 MG/DL (ref 70–110)
HBA1C MFR BLD HPLC: 7.4 % (ref 4–5.6)
HCT VFR BLD AUTO: 47 % (ref 40–54)
HDLC SERPL-MCNC: 44 MG/DL (ref 40–75)
HDLC SERPL: 25 % (ref 20–50)
HGB BLD-MCNC: 15.1 G/DL (ref 14–18)
IMM GRANULOCYTES # BLD AUTO: 0.03 K/UL (ref 0–0.04)
IMM GRANULOCYTES NFR BLD AUTO: 0.4 % (ref 0–0.5)
LDLC SERPL CALC-MCNC: 113 MG/DL (ref 63–159)
LYMPHOCYTES # BLD AUTO: 2.4 K/UL (ref 1–4.8)
LYMPHOCYTES NFR BLD: 28.9 % (ref 18–48)
MCH RBC QN AUTO: 28.9 PG (ref 27–31)
MCHC RBC AUTO-ENTMCNC: 32.1 G/DL (ref 32–36)
MCV RBC AUTO: 90 FL (ref 82–98)
MONOCYTES # BLD AUTO: 0.8 K/UL (ref 0.3–1)
MONOCYTES NFR BLD: 9.3 % (ref 4–15)
NEUTROPHILS # BLD AUTO: 4.9 K/UL (ref 1.8–7.7)
NEUTROPHILS NFR BLD: 58.1 % (ref 38–73)
NONHDLC SERPL-MCNC: 132 MG/DL
NRBC BLD-RTO: 0 /100 WBC
PLATELET # BLD AUTO: 264 K/UL (ref 150–350)
PMV BLD AUTO: 10 FL (ref 9.2–12.9)
POTASSIUM SERPL-SCNC: 4.5 MMOL/L (ref 3.5–5.1)
PROT SERPL-MCNC: 7.4 G/DL (ref 6–8.4)
RBC # BLD AUTO: 5.22 M/UL (ref 4.6–6.2)
SODIUM SERPL-SCNC: 137 MMOL/L (ref 136–145)
TRIGL SERPL-MCNC: 95 MG/DL (ref 30–150)
TSH SERPL DL<=0.005 MIU/L-ACNC: 1.49 UIU/ML (ref 0.4–4)
WBC # BLD AUTO: 8.35 K/UL (ref 3.9–12.7)

## 2019-05-28 PROCEDURE — 36415 COLL VENOUS BLD VENIPUNCTURE: CPT | Mod: HCNC,PO

## 2019-05-28 PROCEDURE — 84153 ASSAY OF PSA TOTAL: CPT | Mod: HCNC

## 2019-05-28 PROCEDURE — 85025 COMPLETE CBC W/AUTO DIFF WBC: CPT | Mod: HCNC

## 2019-05-28 PROCEDURE — 80053 COMPREHEN METABOLIC PANEL: CPT | Mod: HCNC

## 2019-05-28 PROCEDURE — 80061 LIPID PANEL: CPT | Mod: HCNC

## 2019-05-28 PROCEDURE — 83036 HEMOGLOBIN GLYCOSYLATED A1C: CPT | Mod: HCNC

## 2019-05-28 PROCEDURE — 84443 ASSAY THYROID STIM HORMONE: CPT | Mod: HCNC

## 2019-06-03 ENCOUNTER — OFFICE VISIT (OUTPATIENT)
Dept: FAMILY MEDICINE | Facility: CLINIC | Age: 74
End: 2019-06-03
Payer: MEDICARE

## 2019-06-03 VITALS
OXYGEN SATURATION: 96 % | HEART RATE: 76 BPM | SYSTOLIC BLOOD PRESSURE: 132 MMHG | HEIGHT: 70 IN | WEIGHT: 178.81 LBS | BODY MASS INDEX: 25.6 KG/M2 | DIASTOLIC BLOOD PRESSURE: 80 MMHG | TEMPERATURE: 98 F

## 2019-06-03 DIAGNOSIS — K51.919 ULCERATIVE COLITIS WITH COMPLICATION, UNSPECIFIED LOCATION: ICD-10-CM

## 2019-06-03 DIAGNOSIS — I10 ESSENTIAL HYPERTENSION: ICD-10-CM

## 2019-06-03 DIAGNOSIS — L98.9 SKIN LESION: ICD-10-CM

## 2019-06-03 DIAGNOSIS — E78.5 TYPE 2 DIABETES MELLITUS WITH HYPERLIPIDEMIA: ICD-10-CM

## 2019-06-03 DIAGNOSIS — Z00.00 WELLNESS EXAMINATION: Primary | ICD-10-CM

## 2019-06-03 DIAGNOSIS — R97.20 ELEVATED PSA: ICD-10-CM

## 2019-06-03 DIAGNOSIS — E11.69 TYPE 2 DIABETES MELLITUS WITH HYPERLIPIDEMIA: ICD-10-CM

## 2019-06-03 DIAGNOSIS — E78.2 MIXED HYPERLIPIDEMIA: ICD-10-CM

## 2019-06-03 PROCEDURE — 99499 RISK ADDL DX/OHS AUDIT: ICD-10-PCS | Mod: HCNC,S$GLB,, | Performed by: FAMILY MEDICINE

## 2019-06-03 PROCEDURE — 3075F SYST BP GE 130 - 139MM HG: CPT | Mod: HCNC,CPTII,S$GLB, | Performed by: FAMILY MEDICINE

## 2019-06-03 PROCEDURE — 99397 PR PREVENTIVE VISIT,EST,65 & OVER: ICD-10-PCS | Mod: HCNC,S$GLB,, | Performed by: FAMILY MEDICINE

## 2019-06-03 PROCEDURE — 3045F PR MOST RECENT HEMOGLOBIN A1C LEVEL 7.0-9.0%: ICD-10-PCS | Mod: HCNC,CPTII,S$GLB, | Performed by: FAMILY MEDICINE

## 2019-06-03 PROCEDURE — 3079F DIAST BP 80-89 MM HG: CPT | Mod: HCNC,CPTII,S$GLB, | Performed by: FAMILY MEDICINE

## 2019-06-03 PROCEDURE — 99999 PR PBB SHADOW E&M-EST. PATIENT-LVL IV: ICD-10-PCS | Mod: PBBFAC,HCNC,, | Performed by: FAMILY MEDICINE

## 2019-06-03 PROCEDURE — 3079F PR MOST RECENT DIASTOLIC BLOOD PRESSURE 80-89 MM HG: ICD-10-PCS | Mod: HCNC,CPTII,S$GLB, | Performed by: FAMILY MEDICINE

## 2019-06-03 PROCEDURE — 99397 PER PM REEVAL EST PAT 65+ YR: CPT | Mod: HCNC,S$GLB,, | Performed by: FAMILY MEDICINE

## 2019-06-03 PROCEDURE — 99999 PR PBB SHADOW E&M-EST. PATIENT-LVL IV: CPT | Mod: PBBFAC,HCNC,, | Performed by: FAMILY MEDICINE

## 2019-06-03 PROCEDURE — 3075F PR MOST RECENT SYSTOLIC BLOOD PRESS GE 130-139MM HG: ICD-10-PCS | Mod: HCNC,CPTII,S$GLB, | Performed by: FAMILY MEDICINE

## 2019-06-03 PROCEDURE — 99499 UNLISTED E&M SERVICE: CPT | Mod: HCNC,S$GLB,, | Performed by: FAMILY MEDICINE

## 2019-06-03 PROCEDURE — 3045F PR MOST RECENT HEMOGLOBIN A1C LEVEL 7.0-9.0%: CPT | Mod: HCNC,CPTII,S$GLB, | Performed by: FAMILY MEDICINE

## 2019-06-03 NOTE — PROGRESS NOTES
Subjective:       Patient ID: Tres Peace is a 73 y.o. male.    Chief Complaint: Hypertension (6mth fu ); Cough (productive-milky color, 6mths); and Skin Tags (face)    Here for wellness. Doing well overall. Had recent labs.     Hypertension   This is a chronic problem. The current episode started more than 1 year ago. The problem is controlled. Pertinent negatives include no chest pain, headaches, neck pain, palpitations or shortness of breath.   Diabetes   He presents for his follow-up diabetic visit. He has type 2 diabetes mellitus. His disease course has been worsening. Pertinent negatives for hypoglycemia include no confusion or headaches. Pertinent negatives for diabetes include no chest pain, no polydipsia, no polyuria and no weakness.     Review of Systems   Constitutional: Negative for activity change, chills, fever and unexpected weight change.   HENT: Negative for hearing loss, rhinorrhea and trouble swallowing.    Eyes: Negative for discharge and visual disturbance.   Respiratory: Negative for cough, chest tightness, shortness of breath and wheezing.    Cardiovascular: Negative for chest pain, palpitations and leg swelling.   Gastrointestinal: Negative for blood in stool, constipation, diarrhea and vomiting.   Endocrine: Negative for cold intolerance, heat intolerance, polydipsia and polyuria.   Genitourinary: Negative for difficulty urinating, hematuria and urgency.   Musculoskeletal: Negative for arthralgias, joint swelling and neck pain.   Skin: Negative for rash.   Neurological: Negative for weakness and headaches.   Psychiatric/Behavioral: Negative for confusion and dysphoric mood.       Objective:      Physical Exam   Constitutional: He appears well-developed and well-nourished.   HENT:   Head: Normocephalic and atraumatic.   Cardiovascular: Normal rate, regular rhythm and normal heart sounds.   Pulmonary/Chest: Effort normal and breath sounds normal.   Psychiatric: He has a normal mood and  affect.   Nursing note and vitals reviewed.      Assessment:       1. Wellness examination    2. Essential hypertension    3. Mixed hyperlipidemia    4. Type 2 diabetes mellitus with hyperlipidemia    5. Elevated PSA    6. Ulcerative colitis with complication, unspecified location    7. Skin lesion        Plan:       Wellness examination    Essential hypertension  -     CBC auto differential; Future; Expected date: 06/03/2019  -     Comprehensive metabolic panel; Future; Expected date: 06/03/2019  -     Hemoglobin A1c; Future; Expected date: 06/03/2019  -     Lipid panel; Future; Expected date: 06/03/2019  -     TSH; Future; Expected date: 06/03/2019  -     Microalbumin/creatinine urine ratio; Future; Expected date: 06/03/2019    Mixed hyperlipidemia  -     CBC auto differential; Future; Expected date: 06/03/2019  -     Comprehensive metabolic panel; Future; Expected date: 06/03/2019  -     Hemoglobin A1c; Future; Expected date: 06/03/2019  -     Lipid panel; Future; Expected date: 06/03/2019  -     TSH; Future; Expected date: 06/03/2019  -     Microalbumin/creatinine urine ratio; Future; Expected date: 06/03/2019    Type 2 diabetes mellitus with hyperlipidemia  -     CBC auto differential; Future; Expected date: 06/03/2019  -     Comprehensive metabolic panel; Future; Expected date: 06/03/2019  -     Hemoglobin A1c; Future; Expected date: 06/03/2019  -     Lipid panel; Future; Expected date: 06/03/2019  -     TSH; Future; Expected date: 06/03/2019  -     Microalbumin/creatinine urine ratio; Future; Expected date: 06/03/2019    Elevated PSA  -     Ambulatory referral to Urology    Ulcerative colitis with complication, unspecified location    Skin lesion  -     Ambulatory referral to Dermatology    he has room in diet for improvement so will try that and recheck  To urology for slight psa elevation   He would like derm here.  htn stable but monitor at home  Will monitor chronic medical issues and continue current  plan of care.  UC stable and follows with Dr. Carvalho and due for cscope.    Follow up in about 6 months (around 12/3/2019), or if symptoms worsen or fail to improve.

## 2019-06-05 ENCOUNTER — INITIAL CONSULT (OUTPATIENT)
Dept: DERMATOLOGY | Facility: CLINIC | Age: 74
End: 2019-06-05
Payer: MEDICARE

## 2019-06-05 VITALS — HEIGHT: 70 IN | WEIGHT: 178.81 LBS | BODY MASS INDEX: 25.6 KG/M2

## 2019-06-05 DIAGNOSIS — L82.1 SEBORRHEIC KERATOSES: ICD-10-CM

## 2019-06-05 DIAGNOSIS — Z85.828 HISTORY OF NONMELANOMA SKIN CANCER: ICD-10-CM

## 2019-06-05 DIAGNOSIS — D22.9 MULTIPLE BENIGN NEVI: ICD-10-CM

## 2019-06-05 DIAGNOSIS — L57.0 AK (ACTINIC KERATOSIS): ICD-10-CM

## 2019-06-05 DIAGNOSIS — D18.00 ANGIOMA: ICD-10-CM

## 2019-06-05 DIAGNOSIS — D48.9 NEOPLASM OF UNCERTAIN BEHAVIOR: Primary | ICD-10-CM

## 2019-06-05 PROCEDURE — 99999 PR PBB SHADOW E&M-EST. PATIENT-LVL III: ICD-10-PCS | Mod: PBBFAC,HCNC,, | Performed by: DERMATOLOGY

## 2019-06-05 PROCEDURE — 11103 TANGNTL BX SKIN EA SEP/ADDL: CPT | Mod: HCNC,S$GLB,, | Performed by: DERMATOLOGY

## 2019-06-05 PROCEDURE — 88305 TISSUE EXAM BY PATHOLOGIST: CPT | Mod: HCNC,59 | Performed by: PATHOLOGY

## 2019-06-05 PROCEDURE — 1101F PR PT FALLS ASSESS DOC 0-1 FALLS W/OUT INJ PAST YR: ICD-10-PCS | Mod: HCNC,CPTII,S$GLB, | Performed by: DERMATOLOGY

## 2019-06-05 PROCEDURE — 99999 PR PBB SHADOW E&M-EST. PATIENT-LVL III: CPT | Mod: PBBFAC,HCNC,, | Performed by: DERMATOLOGY

## 2019-06-05 PROCEDURE — 11102 TANGNTL BX SKIN SINGLE LES: CPT | Mod: HCNC,S$GLB,, | Performed by: DERMATOLOGY

## 2019-06-05 PROCEDURE — 17000 DESTRUCT PREMALG LESION: CPT | Mod: 59,HCNC,S$GLB, | Performed by: DERMATOLOGY

## 2019-06-05 PROCEDURE — 1101F PT FALLS ASSESS-DOCD LE1/YR: CPT | Mod: HCNC,CPTII,S$GLB, | Performed by: DERMATOLOGY

## 2019-06-05 PROCEDURE — 11102 PR TANGENTIAL BIOPSY, SKIN, SINGLE LESION: ICD-10-PCS | Mod: HCNC,S$GLB,, | Performed by: DERMATOLOGY

## 2019-06-05 PROCEDURE — 11103 PR TANGENTIAL BIOPSY, SKIN, EA ADDTL LESION: ICD-10-PCS | Mod: HCNC,S$GLB,, | Performed by: DERMATOLOGY

## 2019-06-05 PROCEDURE — 99203 PR OFFICE/OUTPT VISIT, NEW, LEVL III, 30-44 MIN: ICD-10-PCS | Mod: 25,HCNC,S$GLB, | Performed by: DERMATOLOGY

## 2019-06-05 PROCEDURE — 88305 TISSUE SPECIMEN TO PATHOLOGY, DERMATOLOGY: ICD-10-PCS | Mod: 26,HCNC,, | Performed by: PATHOLOGY

## 2019-06-05 PROCEDURE — 99203 OFFICE O/P NEW LOW 30 MIN: CPT | Mod: 25,HCNC,S$GLB, | Performed by: DERMATOLOGY

## 2019-06-05 PROCEDURE — 17000 PR DESTRUCTION(LASER SURGERY,CRYOSURGERY,CHEMOSURGERY),PREMALIGNANT LESIONS,FIRST LESION: ICD-10-PCS | Mod: 59,HCNC,S$GLB, | Performed by: DERMATOLOGY

## 2019-06-05 NOTE — LETTER
June 5, 2019      LONI Torres MD  [address] [facility] [address] [place] [address]           [facility]  [address] [facility] [address] [place] [address]  Phone: [phone]  Fax: [phone]          Patient: Tres Peace   MR Number: [id number]   YOB: 1945   Date of Visit: 6/5/2019       Dear Dr. LONI Torres:    Thank you for referring Tres Peace to me for evaluation. Attached you will find relevant portions of my assessment and plan of care.    If you have questions, please do not hesitate to call me. I look forward to following Tres Peace along with you.    Sincerely,    Sabina Martinez MD    Enclosure  CC:  No Recipients    If you would like to receive this communication electronically, please contact [email or web address] or [phone] to request more information on [employer] Link access.    For providers and/or their staff who would like to refer a patient to [facility], please contact us through our one-stop-shop provider referral line, [facility], at [phone].    If you feel you have received this communication in error or would no longer like to receive these types of communications, please e-mail [email or web address]

## 2019-06-05 NOTE — PROGRESS NOTES
Subjective:       Patient ID:  Tres Peace is a 73 y.o. male who presents for   Chief Complaint   Patient presents with    Skin Check     TBSE    Spot     Central chest, recently growing     New pt here today for TBSE. Pt also c/o of spot on central chest present for several years. States has recently been growing. The patient denies any change, including change in color, or spontaneous bleeding, associated with this lesion. Tx with Eucerin.    This is a high risk patient here to check for the development of new lesions.    Previously seen by Dr. Ross  +Phx NMSC (BCC on left cheek tx Mohs, 4 years ago)  Pt denies Fhx Melanoma        Review of Systems   Constitutional: Negative for fever, chills and fatigue.   Skin: Positive for activity-related sunscreen use and wears hat. Negative for daily sunscreen use.   Hematologic/Lymphatic: Does not bruise/bleed easily.        Objective:    Physical Exam   Constitutional: He appears well-developed and well-nourished. No distress.   Neurological: He is alert and oriented to person, place, and time. He is not disoriented.   Psychiatric: He has a normal mood and affect.   Skin:   Areas Examined (abnormalities noted in diagram):   Scalp / Hair Palpated and Inspected  Head / Face Inspection Performed  Neck Inspection Performed  Chest / Axilla Inspection Performed  Abdomen Inspection Performed  Genitals / Buttocks / Groin Inspection Performed  Back Inspection Performed  RUE Inspected  LUE Inspection Performed  RLE Inspected  LLE Inspection Performed  Nails and Digits Inspection Performed                   Diagram Legend     Erythematous scaling macule/papule c/w actinic keratosis       Vascular papule c/w angioma      Pigmented verrucoid papule/plaque c/w seborrheic keratosis      Yellow umbilicated papule c/w sebaceous hyperplasia      Irregularly shaped tan macule c/w lentigo     1-2 mm smooth white papules consistent with Milia      Movable subcutaneous cyst with punctum  c/w epidermal inclusion cyst      Subcutaneous movable cyst c/w pilar cyst      Firm pink to brown papule c/w dermatofibroma      Pedunculated fleshy papule(s) c/w skin tag(s)      Evenly pigmented macule c/w junctional nevus     Mildly variegated pigmented, slightly irregular-bordered macule c/w mildly atypical nevus      Flesh colored to evenly pigmented papule c/w intradermal nevus       Pink pearly papule/plaque c/w basal cell carcinoma      Erythematous hyperkeratotic cursted plaque c/w SCC      Surgical scar with no sign of skin cancer recurrence      Open and closed comedones      Inflammatory papules and pustules      Verrucoid papule consistent consistent with wart     Erythematous eczematous patches and plaques     Dystrophic onycholytic nail with subungual debris c/w onychomycosis     Umbilicated papule    Erythematous-base heme-crusted tan verrucoid plaque consistent with inflamed seborrheic keratosis     Erythematous Silvery Scaling Plaque c/w Psoriasis     See annotation                  Assessment / Plan:      Pathology Orders:     Normal Orders This Visit    Tissue Specimen To Pathology, Dermatology     Questions:    Directional Terms:  Other(comment)    Clinical Information:  BCC    Specific Site:  left cheek    Tissue Specimen To Pathology, Dermatology     Questions:    Directional Terms:  Other(comment)    Clinical Information:  SK    Specific Site:  right cheek    Tissue Specimen To Pathology, Dermatology     Questions:    Directional Terms:  Other(comment)    Clinical Information:  BCC    Specific Site:  right forearm        Neoplasm of uncertain behavior  -     Tissue Specimen To Pathology, Dermatology  -     Tissue Specimen To Pathology, Dermatology  -     Tissue Specimen To Pathology, Dermatology    Shave biopsy procedure note:    Shave biopsy performed after verbal consent including risk of infection, scar, recurrence, need for additional treatment of site. Area prepped with alcohol,  anesthetized with approximately 1.0cc of 1% lidocaine with epinephrine. Lesional tissue shaved with razor blade. Hemostasis achieved with application of aluminum chloride followed by hyfrecation. No complications. Dressing applied. Wound care explained.    If biopsy on left cheek/infraorbital area positive for malignancy, refer to Dr. Lopez for Mohs surgery consultation.  If biopsy on forearm positive, schedule procedure for definitive excision vs ED&C      AK (actinic keratosis)  Premalignant nature discussed     Cryosurgery Procedure Note    Verbal consent from the patient is obtained including, but not limited to, risk of hypopigmentation/hyperpigmentation, scar, recurrence of lesion. The patient is aware of the precancerous quality and need for treatment of these lesions. Liquid nitrogen cryosurgery is applied to the 1 actinic keratoses, as detailed in the physical exam, to produce a freeze injury. The patient is aware that blisters may form and is instructed on wound care with gentle cleansing and use of vaseline ointment to keep moist until healed. The patient is supplied a handout on cryosurgery and is instructed to call if lesions do not completely resolve.      Multiple benign nevi  total body skin examination performed today including at least 12 points as noted in physical examination. No lesions suspicious for malignancy noted.  Reassurance provided.  Instructed patient to observe lesion(s) for changes and follow up in clinic if changes are noted. Discussed ABCDE's of moles and brochure provided.      Seborrheic keratoses  These are benign inherited growths without a malignant potential. Reassurance given to patient. No treatment is necessary.       Angioma  This is a benign vascular lesion. Reassurance given. No treatment required.       History of nonmelanoma skin cancer  Area of previous NMSC examined. Site well healed with no signs of recurrence.    Total body skin examination performed today  including at least 12 points as noted in physical examination. Suspicious lesions noted.               Follow up in about 6 months (around 12/5/2019).       ADDENDUM:  1. Skin, left cheek, shave biopsy:  - BASAL CELL CARCINOMA WITH NODULAR GROWTH PATTERN.  - THE TUMOR EXTENDS TO THE DEEP AND LATERAL BIOPSY MARGINS.  MICROSCOPIC DESCRIPTION: Sections shows a nodular tumor composed of basaloid cells exhibiting peripheral  palisading, retraction artifact and apoptosis.  2. Skin, right cheek, shave biopsy:  - IRRITATED SEBORRHEIC KERATOSIS.  MICROSCOPIC DESCRIPTION: Sections show an abrupt proliferation of benign basaloid keratinocytes associated  with horn pseudocyst formation and squamous eddies.  3. Skin, right forearm, shave biopsy:  - BASAL CELL CARCINOMA.  - THE TUMOR FOCALLY APPROACHES THE DEEP BIOPSY MARGIN.  MICROSCOPIC DESCRIPTION: Sections show a basaloid tumor within the dermis exhibiting peripheral palisading,  retraction artifact and apoptosis.  Diagnosed by: Fuentes Armendariz M.D.      Lesion 1 - recommend Mohs. Refer to Dr. Lopez   Lesion 2 - benign. No further treatment is needed.   Lesion 3 - Recommend ED&C with me in 4-12 wks.

## 2019-06-12 ENCOUNTER — TELEPHONE (OUTPATIENT)
Dept: DERMATOLOGY | Facility: CLINIC | Age: 74
End: 2019-06-12

## 2019-06-12 NOTE — TELEPHONE ENCOUNTER
----- Message from Sabina Martinez MD sent at 6/12/2019  8:12 AM CDT -----  Please call patient and inform of NMSC diagnoses for lesions 1 and 3.     Lesion 1 -  Recommend patient to Dr. Lopez for Mohs surgery consultation. Picture in chart.  Lesion 2 - benign. No further treatment is needed.   Lesion 3 - Recommend ED&C with me in 4-12 wks. 30 min slot       1. Skin, left cheek, shave biopsy:  - BASAL CELL CARCINOMA WITH NODULAR GROWTH PATTERN.  - THE TUMOR EXTENDS TO THE DEEP AND LATERAL BIOPSY MARGINS.  MICROSCOPIC DESCRIPTION: Sections shows a nodular tumor composed of basaloid cells exhibiting peripheral  palisading, retraction artifact and apoptosis.  2. Skin, right cheek, shave biopsy:  - IRRITATED SEBORRHEIC KERATOSIS.  MICROSCOPIC DESCRIPTION: Sections show an abrupt proliferation of benign basaloid keratinocytes associated  with horn pseudocyst formation and squamous eddies.  3. Skin, right forearm, shave biopsy:  - BASAL CELL CARCINOMA.  - THE TUMOR FOCALLY APPROACHES THE DEEP BIOPSY MARGIN.  MICROSCOPIC DESCRIPTION: Sections show a basaloid tumor within the dermis exhibiting peripheral palisading,  retraction artifact and apoptosis.  Diagnosed by: Fuentes Armendariz M.D.

## 2019-06-13 NOTE — TELEPHONE ENCOUNTER
Called patient with path report and he is scheduled for a consult [facility] Dr. Lopez on 7-18-19

## 2019-06-25 DIAGNOSIS — E78.5 TYPE 2 DIABETES MELLITUS WITH HYPERLIPIDEMIA: ICD-10-CM

## 2019-06-25 DIAGNOSIS — E11.69 TYPE 2 DIABETES MELLITUS WITH HYPERLIPIDEMIA: ICD-10-CM

## 2019-06-26 ENCOUNTER — OFFICE VISIT (OUTPATIENT)
Dept: UROLOGY | Facility: CLINIC | Age: 74
End: 2019-06-26
Payer: MEDICARE

## 2019-06-26 VITALS
SYSTOLIC BLOOD PRESSURE: 142 MMHG | HEART RATE: 66 BPM | WEIGHT: 176.56 LBS | HEIGHT: 70 IN | BODY MASS INDEX: 25.28 KG/M2 | DIASTOLIC BLOOD PRESSURE: 92 MMHG

## 2019-06-26 DIAGNOSIS — N40.1 BENIGN PROSTATIC HYPERPLASIA WITH NOCTURIA: ICD-10-CM

## 2019-06-26 DIAGNOSIS — R97.20 ELEVATED PSA: Primary | ICD-10-CM

## 2019-06-26 DIAGNOSIS — R35.1 BENIGN PROSTATIC HYPERPLASIA WITH NOCTURIA: ICD-10-CM

## 2019-06-26 LAB
BILIRUB SERPL-MCNC: NORMAL MG/DL
BLOOD URINE, POC: NORMAL
COLOR, POC UA: YELLOW
GLUCOSE UR QL STRIP: NORMAL
KETONES UR QL STRIP: NORMAL
LEUKOCYTE ESTERASE URINE, POC: NORMAL
NITRITE, POC UA: NORMAL
PH, POC UA: 5.5
PROTEIN, POC: NORMAL
SPECIFIC GRAVITY, POC UA: 1.02
UROBILINOGEN, POC UA: NORMAL

## 2019-06-26 PROCEDURE — 99204 PR OFFICE/OUTPT VISIT, NEW, LEVL IV, 45-59 MIN: ICD-10-PCS | Mod: HCNC,25,S$GLB, | Performed by: UROLOGY

## 2019-06-26 PROCEDURE — 81002 URINALYSIS NONAUTO W/O SCOPE: CPT | Mod: HCNC,S$GLB,, | Performed by: UROLOGY

## 2019-06-26 PROCEDURE — 99204 OFFICE O/P NEW MOD 45 MIN: CPT | Mod: HCNC,25,S$GLB, | Performed by: UROLOGY

## 2019-06-26 PROCEDURE — 3080F PR MOST RECENT DIASTOLIC BLOOD PRESSURE >= 90 MM HG: ICD-10-PCS | Mod: HCNC,CPTII,S$GLB, | Performed by: UROLOGY

## 2019-06-26 PROCEDURE — 1101F PT FALLS ASSESS-DOCD LE1/YR: CPT | Mod: HCNC,CPTII,S$GLB, | Performed by: UROLOGY

## 2019-06-26 PROCEDURE — 3080F DIAST BP >= 90 MM HG: CPT | Mod: HCNC,CPTII,S$GLB, | Performed by: UROLOGY

## 2019-06-26 PROCEDURE — 3077F PR MOST RECENT SYSTOLIC BLOOD PRESSURE >= 140 MM HG: ICD-10-PCS | Mod: HCNC,CPTII,S$GLB, | Performed by: UROLOGY

## 2019-06-26 PROCEDURE — 81002 POCT URINE DIPSTICK WITHOUT MICROSCOPE: ICD-10-PCS | Mod: HCNC,S$GLB,, | Performed by: UROLOGY

## 2019-06-26 PROCEDURE — 99999 PR PBB SHADOW E&M-EST. PATIENT-LVL III: ICD-10-PCS | Mod: PBBFAC,HCNC,, | Performed by: UROLOGY

## 2019-06-26 PROCEDURE — 3077F SYST BP >= 140 MM HG: CPT | Mod: HCNC,CPTII,S$GLB, | Performed by: UROLOGY

## 2019-06-26 PROCEDURE — 99999 PR PBB SHADOW E&M-EST. PATIENT-LVL III: CPT | Mod: PBBFAC,HCNC,, | Performed by: UROLOGY

## 2019-06-26 PROCEDURE — 1101F PR PT FALLS ASSESS DOC 0-1 FALLS W/OUT INJ PAST YR: ICD-10-PCS | Mod: HCNC,CPTII,S$GLB, | Performed by: UROLOGY

## 2019-06-26 NOTE — LETTER
June 26, 2019      LONI Torres MD  [address] [facility] [address] [place] [address]           [facility]  [address] [facility] [address] [place] [address]  Phone: [phone]  Fax: [phone]          Patient: Tres Peace   MR Number: [id number]   YOB: 1945   Date of Visit: 6/26/2019       Dear Dr. LONI Torres:    Thank you for referring Tres Peace to me for evaluation. Attached you will find relevant portions of my assessment and plan of care.    If you have questions, please do not hesitate to call me. I look forward to following Tres Peace along with you.    Sincerely,    Salbador Palafox MD    Enclosure  CC:  No Recipients    If you would like to receive this communication electronically, please contact [email or web address] or [phone] to request more information on [employer] Link access.    For providers and/or their staff who would like to refer a patient to [facility], please contact us through our one-stop-shop provider referral line, [facility], at [phone].    If you feel you have received this communication in error or would no longer like to receive these types of communications, please e-mail [email or web address]

## 2019-06-26 NOTE — PROGRESS NOTES
"UROLOGY [place]  6 26 19    Cc elevated psa      Age 73 , comes in with a psa of 4.8, had been 2.5 one year ago. No hx of previous psa elevation. No burning or pains on voiding. Stream is good, no need to push to void.     Nocturia x 2.      PMH    Surgical:  has a past surgical history that includes Tonsillectomy; Esophagogastroduodenoscopy (12/20/2010); Colonoscopy w/ biopsies (12/20/2010); Esophagogastroduodenoscopy (05/03/2012); Colonoscopy w/ polypectomy (05/03/2012); Esophagogastroduodenoscopy (07/18/2013); Colonoscopy w/ biopsies (07/18/2013); Skin biopsy; Colonoscopy (N/A, 6/23/2016); Circumcision; and Vasectomy. skin cancer.     Medical:  has a past medical history of Basal cell carcinoma of cheek, BCC (basal cell carcinoma of skin), Diabetes mellitus type II, Diabetes with neurologic complications, GERD (gastroesophageal reflux disease), Hyperlipidemia, Hypertension, Type II or unspecified type diabetes mellitus without mention of complication, not stated as uncontrolled, and Ulcerative colitis, chronic. Hard of hearing    Familial: mother had breast ca, father skin cancer and had a stroke. 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     Meds:   Current Outpatient Medications on File Prior to Visit   Medication Sig Dispense Refill    aspirin 81 MG Chew Take 1 tablet (81 mg       atorvastatin (LIPITOR) 40 MG tablet Take 1 tablet (40 mg total) by mouth on 90 tablet 3    BD ULTRA-FINE EMERSON PEN NEEDLE 32 gauge x 5/32" Ndle USE WITH INSULIN EVERYDAY 100 each 2    blood sugar diagnostic (ACCU-CHEK CON PLUS TEST DAILY. 100 strip 1    BREO ELLIPTA 100-25 mcg/dose diskus inhaler Inhale 1 puff into the lungs every oth      glipiZIDE (GLUCOTROL) 10 MG tablet TAKE 1 TABLET BY MOUTH EVERY DAY 90 tablet 0    insulin glargine (LANTUS SOLOSTAR U-100 INSULIN) 100 unit/mL (3 mL) InPn pen INJECT 18 UNITS INTO THE SKIN ONCE DAILY. 45 Syringe 0    losartan " (COZAAR) 100 MG tablet Take 1 tablet (100 mg total) by  90 tablet 3    omeprazole (PRILOSEC) 40 MG capsule TAKE 1 CAPSULE (40 MG 90 capsule 0    pen needle, diabetic (BD ULTRA-FINE EMERSON PE Test glucose once daily 100 each 11       REVIEW OF SYSTEMS  GENERAL:  No headaches or dizzy spells.   HEENT: vision preserved. Sinuses: has complaints.   CARDIOPULMONARY: No swelling of the legs; no chest pain. No shortness of breath.   GASTROINTESTINAL: has heartburn. Denies diarrhea; denies constipation, no blood or mucus in stools.   GENITOURINARY: has high psa. Denies dysuria, bleeding or incontinence.   MUSCULOSKELETAL: No arthritic complaints such as pain or stiffness.   PSYCHIATRIC: No history of depression or anxiety.   ENDOCRINOLOGIC: No reports of sweating, cold or heat intolerance. No polyuria or polydipsia.   ==  NEUROLOGICAL: No dizziness, syncope, paralysis  LYMPHATICS: No enlarged nodes. No history of splenectomy.    Pt alert, oriented, no distress  HEENT: wnl.  Neck: supple, no JVD, no lymphadenopathy  Chest: CV NSR  Lungs: normal chest expansion, no labored breathing  Abdomen flat, nontender, no organomegaly, no masses.  No hernias. No surgical markings.  Penis circumcised, meatus nl  Testes nl, epi nl, scrotum nl  ADAMA: anus nl, sphincter nl tone, mucosa without lesions, prostate 60 gm, symmetric, no nodules or indurations  Extremities: no edema, peripheral pulses nl  Neuro: preserved    IMP    Elevated psa  bph on observation  Will follow every 6 months with adama and psa

## 2019-07-01 RX ORDER — OMEPRAZOLE 40 MG/1
CAPSULE, DELAYED RELEASE ORAL
Qty: 90 CAPSULE | Refills: 0 | Status: SHIPPED | OUTPATIENT
Start: 2019-07-01 | End: 2019-10-16 | Stop reason: SDUPTHER

## 2019-07-09 DIAGNOSIS — E11.69 TYPE 2 DIABETES MELLITUS WITH HYPERLIPIDEMIA: Primary | ICD-10-CM

## 2019-07-09 DIAGNOSIS — E78.5 TYPE 2 DIABETES MELLITUS WITH HYPERLIPIDEMIA: Primary | ICD-10-CM

## 2019-07-09 NOTE — PROGRESS NOTES
Refill Authorization Note     is requesting a refill authorization.    Brief assessment and rationale for refill: APPROVE: prr  Name and strength of medication: LANTUS SOLOSTAR U-100 INSULIN glargine 100 units/mL (3mL) SubQ pen  Medication-related problems identified:   Therapeutic duplication  Indication for new tx - no therapy    Medication Therapy Plan: DM- lco(lov); A1C > 7; will pend needles as well; Duplicates marked for review; Approve 12 more months of pen needles and 3 more months of lantus    Medication reconciliation completed: No              How patient will take medication: 18 units qd           Comments:   Last A1C: (6 months)  Lab Results   Component Value Date    HGBA1C 7.4 (H) 05/28/2019    HGBA1C 7.0 (H) 11/20/2018    HGBA1C 7.6 (H) 06/12/2018    No results found for: LABA1C   Last BMP: (12 months)  BMP  Lab Results   Component Value Date     05/28/2019    K 4.5 05/28/2019     05/28/2019    CO2 27 05/28/2019    BUN 15 05/28/2019    CREATININE 1.0 05/28/2019    CALCIUM 9.9 05/28/2019    ANIONGAP 8 05/28/2019    ESTGFRAFRICA >60.0 05/28/2019    EGFRNONAA >60.0 05/28/2019      Digital Medicine Data: (values will display if enrolled)  Last 6 Patient Entered Readings                                          Most Recent A1c:      There is no flowsheet data to display.        - No concurrent serious illness or elevated hypoglycemic risk               APPOINTMENTS (past 12m or future 3m authorizing provider)  LAST VISIT DATE  LONI Torres MD 6/3/2019         NEXT VISIT DATE  LONI Torres MD 12/6/2019

## 2019-07-10 RX ORDER — INSULIN GLARGINE 100 [IU]/ML
INJECTION, SOLUTION SUBCUTANEOUS
Qty: 15 SYRINGE | Refills: 0 | Status: SHIPPED | OUTPATIENT
Start: 2019-07-10 | End: 2019-08-12 | Stop reason: SDUPTHER

## 2019-07-10 RX ORDER — PEN NEEDLE, DIABETIC 30 GX3/16"
NEEDLE, DISPOSABLE MISCELLANEOUS
Qty: 100 EACH | Refills: 3 | Status: SHIPPED | OUTPATIENT
Start: 2019-07-10 | End: 2019-10-16 | Stop reason: SDUPTHER

## 2019-07-18 ENCOUNTER — INITIAL CONSULT (OUTPATIENT)
Dept: DERMATOLOGY | Facility: CLINIC | Age: 74
End: 2019-07-18
Payer: MEDICARE

## 2019-07-18 VITALS
BODY MASS INDEX: 25.48 KG/M2 | SYSTOLIC BLOOD PRESSURE: 141 MMHG | DIASTOLIC BLOOD PRESSURE: 84 MMHG | WEIGHT: 178 LBS | HEIGHT: 70 IN | HEART RATE: 178 BPM

## 2019-07-18 DIAGNOSIS — C44.319 BASAL CELL CARCINOMA OF LEFT CHEEK: Primary | ICD-10-CM

## 2019-07-18 PROCEDURE — 1101F PR PT FALLS ASSESS DOC 0-1 FALLS W/OUT INJ PAST YR: ICD-10-PCS | Mod: HCNC,CPTII,S$GLB, | Performed by: DERMATOLOGY

## 2019-07-18 PROCEDURE — 1101F PT FALLS ASSESS-DOCD LE1/YR: CPT | Mod: HCNC,CPTII,S$GLB, | Performed by: DERMATOLOGY

## 2019-07-18 PROCEDURE — 3079F PR MOST RECENT DIASTOLIC BLOOD PRESSURE 80-89 MM HG: ICD-10-PCS | Mod: HCNC,CPTII,S$GLB, | Performed by: DERMATOLOGY

## 2019-07-18 PROCEDURE — 99214 OFFICE O/P EST MOD 30 MIN: CPT | Mod: HCNC,S$GLB,, | Performed by: DERMATOLOGY

## 2019-07-18 PROCEDURE — 3077F SYST BP >= 140 MM HG: CPT | Mod: HCNC,CPTII,S$GLB, | Performed by: DERMATOLOGY

## 2019-07-18 PROCEDURE — 3079F DIAST BP 80-89 MM HG: CPT | Mod: HCNC,CPTII,S$GLB, | Performed by: DERMATOLOGY

## 2019-07-18 PROCEDURE — 3077F PR MOST RECENT SYSTOLIC BLOOD PRESSURE >= 140 MM HG: ICD-10-PCS | Mod: HCNC,CPTII,S$GLB, | Performed by: DERMATOLOGY

## 2019-07-18 PROCEDURE — 99999 PR PBB SHADOW E&M-EST. PATIENT-LVL III: ICD-10-PCS | Mod: PBBFAC,HCNC,, | Performed by: DERMATOLOGY

## 2019-07-18 PROCEDURE — 99214 PR OFFICE/OUTPT VISIT, EST, LEVL IV, 30-39 MIN: ICD-10-PCS | Mod: HCNC,S$GLB,, | Performed by: DERMATOLOGY

## 2019-07-18 PROCEDURE — 99999 PR PBB SHADOW E&M-EST. PATIENT-LVL III: CPT | Mod: PBBFAC,HCNC,, | Performed by: DERMATOLOGY

## 2019-07-18 NOTE — PROGRESS NOTES
ALLERGIES:  Compazine [prochlorperazine edisylate]    CHIEF COMPLAINT:  This 73 y.o. male comes for evaluation for Mohs' Micrographic Surgery, Fresh Tissue Technique, for treatment of a biopsy-proven basal cell carcinoma on the left cheek. Consultation requested by Sabina grant M.D..    HISTORY OF PRESENT ILLNESS:   Location: left cheek  Duration: 3 months or more  Quality: persistent  Context: status post biopsy by Sabina Grant MD ; path = basal cell carcinoma; pathology accession # [id number],  [facility]    Prior Treatment: none  See also the handwritten notes/diagrams scanned to chart for additional details.    Defibrillator: No  Pacemaker: No  Artificial heart valves: No  Artificial joints: No    REVIEW OF SYSTEMS:   General: general health good  Skin: previous skin cancer(s) Yes BCC's  Relevant other:  No   Cardiovascular:   High Blood Pressure: Yes   Chest Pain:  No   Defibrillator: as above  Pacemaker: as above  Artificial heart valves: as above  Prior Endocarditis: No   Prior Heart Attack/MI: No     If yes, when:   Prior Cardiac Bypass or Stents:  No   If yes, when:   Mitral Valve Prolapse: No   Relevant other:  No   Respiratory:   Shortness of breath:  No   Relevant other:  No   Endocrine:   Diabetes:  Yes Type II  Relevant other:  No   Hem/Lymph:   Taking Prescribed Blood Thinners:  Yes, ASPIRIN  Easy Bleeding:  No   Relevant other:  No   Allergy/Immuno: as noted above  Relevant other:  No   GI:   Prior Hepatitis:  No     If yes, details:   Relevant other:  No   Musculoskeletal:   Artificial joints: as above  Relevant other:  No   Neurologic:   Prior Stroke:  No     If yes, details:   Relevant other:  No   Relevant other info:  No      PAST MEDICAL HISTORY:  Past Medical History:   Diagnosis Date    Basal cell carcinoma of cheek     BCC (basal cell carcinoma of skin)     Dr. Shaggy Ross, mohs surgery    Diabetes mellitus type II     Diabetes with neurologic complications     GERD  (gastroesophageal reflux disease)     Hyperlipidemia     Hypertension     Type II or unspecified type diabetes mellitus without mention of complication, not stated as uncontrolled 10/22/2012    Ulcerative colitis, chronic 10/22/2012    Sees Dr. Franco Herndon       PAST SURGICAL HISTORY:  Past Surgical History:   Procedure Laterality Date    CIRCUMCISION      COLONOSCOPY N/A 6/23/2016    Per[facility]    COLONOSCOPY N/A 7/18/2013    [facility]    COLONOSCOPY W/ BIOPSIES  12/20/2010    BEARD.   Mod to severe inflammation throughout the colon, c/w ulcerative colitis.    COLONOSCOPY W/ BIOPSIES  07/18/2013    ELIZABETH.   Post-ulcerative colitis scars in the entire colon.  Pseudopolyps from the descending to the ascending colon.  Sigmoid diverticulosis.  Internal hemorrhoids.    COLONOSCOPY W/ POLYPECTOMY  05/03/2012    BEARD.   One 5 mm polyp in the transverse colon.  JUVENILE POLYP.  Sigmoid diverticulosis.  No active inflammation. but pseudopolyps and mucosal scarring.      EGD (ESOPHAGOGASTRODUODENOSCOPY) N/A 7/18/2013    Per[facility] by [facility]    ESOPHAGOGASTRODUODENOSCOPY  12/20/2010    BEARD.   Normal esophagua.  Granular mucosa in the stomach.  Gastritis.  4 mm duodenal bulb ulcer.      ESOPHAGOGASTRODUODENOSCOPY  05/03/2012    BEARD.   Grade A reflux esophagitis.  Small hiatal hernia.  Gastritis.  4 mm duodenal bulb ulcer.  8 mm duod ulcer 2nd part. Duod stenosis 1st part duod.    ESOPHAGOGASTRODUODENOSCOPY  07/18/2013    ELIZABETH.   Reflux esophagitis. Gastritis/antritis. CLOtest negative.  Erythematous mucosa was found in the duodenal bulb.  Duodenal diverticulum.      SKIN BIOPSY      skin CA removal    TONSILLECTOMY      VASECTOMY          SOCIAL HISTORY:  Dependencies: smoking status as noted below  Social History     Tobacco Use    Smoking status: Never Smoker    Smokeless  "tobacco: Never Used   Substance Use Topics    Alcohol use: Yes     Comment: 1 drink every other day    Drug use: No       PERTINENT MEDICATIONS:  See medications list.    Current Outpatient Medications:     aspirin 81 MG Chew, Take 1 tablet (81 mg total) by mouth once daily., Disp: , Rfl:     atorvastatin (LIPITOR) 40 MG tablet, Take 1 tablet (40 mg total) by mouth once daily., Disp: 90 tablet, Rfl: 3    blood sugar diagnostic (ACCU-CHEK CON PLUS TEST STRP) Strp, TEST DAILY., Disp: 100 strip, Rfl: 1    BREO ELLIPTA 100-25 mcg/dose diskus inhaler, Inhale 1 puff into the lungs every other day. , Disp: , Rfl:     glipiZIDE (GLUCOTROL) 10 MG tablet, TAKE 1 TABLET BY MOUTH EVERY DAY, Disp: 90 tablet, Rfl: 0    insulin (LANTUS SOLOSTAR U-100 INSULIN) glargine 100 units/mL (3mL) SubQ pen, INJECT 18 UNITS INTO THE SKIN ONCE DAILY., Disp: 15 Syringe, Rfl: 0    losartan (COZAAR) 100 MG tablet, Take 1 tablet (100 mg total) by mouth once daily., Disp: 90 tablet, Rfl: 3    omeprazole (PRILOSEC) 40 MG capsule, TAKE 1 CAPSULE BY MOUTH EVERY DAY IN THE MORNING, Disp: 90 capsule, Rfl: 0    pen needle, diabetic (BD ULTRA-FINE EMERSON PEN NEEDLE) 32 gauge x 5/32" Ndle, USE DAILY WITH INJECTABLE INSULIN PEN, Disp: 100 each, Rfl: 3    ALLERGIES:  Compazine [prochlorperazine edisylate]    EXAM:  See also the handwritten notes/diagrams scanned to chart for additional details.  Constitutional  General appearance: well-developed, well-nourished, well-kempt older white male    Eyes  Inspection of conjunctivae and lids reveals no abnormalities; sclerae anicteric  Neurologic/Psychiatric  Alert,  normal orientation to time, place, person  Normal mood and affect with no evidence of depression, anxiety, agitation  Skin: see photo(s)  Head: background marked solar damage to exposed areas of skin; in addition, inspection/palpation reveals an approximately 7-8 mm depression on the left malar cheek which feels freely movable over the " underlying tissues on palpation; site(s) confirmed by reference to the photograph(s) attached below taken at the time of the biopsy/biopsies by the referring physician and he confirmed this as the site of the prior biopsy  Neck: examination reveals marked chronic solar damage  Right upper extremity: examination reveals marked chronic solar damage  Left upper extremity: examination reveals marked chronic solar damage      Photo(s) this visit:       Photo(s) from biopsy visit:      ASSESSMENT: biopsy-proven basal cell carcinoma of the left malar cheek  chronic solar damage to areas as noted above  personal history of non-melanoma skin cancer    PLAN:  The diagnosis and management options, and risks and benefits of the alternatives, including observation/non-treatment, radiation treatment, excision with vertical frozen section or paraffin-embedded section margin evaluation, and Mohs' Micrographic Surgery, Fresh Tissue Technique, were discussed at length with the patient. In particular, the discussion included, but was not limited to, the following:    One alternative at this point would be to defer further treatment and observe the lesion. With small skin cancers of this kind, it is possible that a biopsy can be sufficient to definitively treat a small skin cancer of this kind. Alternatively, some skin cancers are slow growing and do not require immediate treatment. The potential advantage of this choice would be to avoid the need for possibly unnecessary additional surgery. Among the potential disadvantages of this would be the possibility of enlargement of the lesion, more extensive spread of the lesion or recurrence at a later date, which might necessitate a larger and more complex surgery.    Radiation treatment can be an effective treatment for this type of skin cancer. The usual course of treatment is every weekday for several weeks. Local irritation will result from treatment, although no systemic side effects  are expected. The potential advantage of radiation treatment is that it avoids the need for surgery. Among the disadvantages of radiation treatment are the length of treatment, the local inflammatory response, the absence of pathologic confirmation of the removal of the skin cancer, a possible increased risk of additional skin cancer in the treated area in later years, and a somewhat increased risk of recurrence at a later date.     Excisional surgery can be an effective treatment for this type of skin cancer. This would involve excision of the lesion with margin evaluation by submitting the specimen to a pathologist for either immediate marginal assessment via frozen section processing, or delayed marginal assessment by fixed-tissue processing. The potential advantage of this technique is that it offers a way of treating the lesion with some degree of histologic confirmation of tumor removal. Among the disadvantages of this treatment are the possible need for re-excision if marginal involvement is identified, a somewhat greater likelihood of recurrence as compared to Mohs' surgery because of the less comprehensive margin evaluation inherent in the technique, and the general potential risks of surgery, including allergic reactions to the anesthetic and other materials used, infection, injury to nerves in the area with consequent loss of sensation or muscle function, and scarring or distortion of surrounding structures.    Mohs' surgery is a very effective treatment for this type of skin cancer. The potential advantage of Mohs' surgery is that this technique offers the greatest possible certainty of knowing that the skin cancer has been completely removed, with the removal of the least amount of normal tissue. The potential disadvantages of Mohs' surgery include the duration of the surgery, the possible need for a separate surgery for reconstruction following tumor removal, and scarring as a result. In addition,  general potential risks of surgery as noted above also apply to treatment via Mohs' surgery.    In light of the nature of this tumor and the location on the face in an area of increased risk of recurrence,  Mohs' micrographic surgery was thought to be the most appropriate management choice, and this diagnosis is appropriate for treatment by Mohs' micrographic surgery.     Sufficient time was available for questions, and all questions were answered to his satisfaction. He fully understands the aims, risks, alternatives, and possible complications, and has elected to proceed with the surgery, and verbally consented to do so. The procedure will be scheduled in the near future.    Routine pre-op instructions were given to him.    I instructed him to discontinue ASA prior to surgery.  --------------------------------------  Note: Some or all of this note may have been generated using voice recognition software. There may be voice recognition errors including grammatical and/or spelling errors found in the text. Attempts were made to correct these errors prior to signature.

## 2019-07-18 NOTE — LETTER
July 20, 2019      Sabina Martinez MD  [address] [facility] [address] [place] [address]           [facility]  [address] [facility] [address] [place] [address]  Phone: [phone]          Patient: Tres Peace   MR Number: [id number]   YOB: 1945   Date of Visit: 7/18/2019       Dear Dr. Sabina Martinez:    Thank you for referring Tres Peace to me for evaluation. Attached you will find relevant portions of my assessment and plan of care.    If you have questions, please do not hesitate to call me. I look forward to following Tres Peace along with you.    Sincerely,    Kev Lopez MD    Enclosure  CC:  No Recipients    If you would like to receive this communication electronically, please contact [email or web address] or [phone] to request more information on [employer] (fka [employer]) Link access.    For providers and/or their staff who would like to refer a patient to [facility], please contact us through our one-stop-shop provider referral line, [facility] , at [phone].    If you feel you have received this communication in error or would no longer like to receive these types of communications, please e-mail [email or web address]

## 2019-08-12 DIAGNOSIS — E11.69 TYPE 2 DIABETES MELLITUS WITH HYPERLIPIDEMIA: ICD-10-CM

## 2019-08-12 DIAGNOSIS — E78.5 TYPE 2 DIABETES MELLITUS WITH HYPERLIPIDEMIA: ICD-10-CM

## 2019-08-13 RX ORDER — INSULIN GLARGINE 100 [IU]/ML
INJECTION, SOLUTION SUBCUTANEOUS
Qty: 15 SYRINGE | Refills: 0 | Status: SHIPPED | OUTPATIENT
Start: 2019-08-13 | End: 2020-01-06

## 2019-10-11 ENCOUNTER — PATIENT OUTREACH (OUTPATIENT)
Dept: ADMINISTRATIVE | Facility: OTHER | Age: 74
End: 2019-10-11

## 2019-10-11 NOTE — LETTER
AUTHORIZATION FOR RELEASE OF   CONFIDENTIAL INFORMATION    Dear Baldemar Rivas OD,    We are seeing Tres Peace, date of birth 1945, in the clinic at [facility]. BRANT Torres MD is the patient's PCP. Tres Peace has an outstanding lab/procedure at the time we reviewed his chart. In order to help keep his health information updated, he has authorized us to request the following medical record(s):        (  )  MAMMOGRAM                                      (  )  COLONOSCOPY      (  )  PAP SMEAR                                          (  )  OUTSIDE LAB RESULTS     (  )  DEXA SCAN                                          ( x )  EYE EXAM            (  )  FOOT EXAM                                          (  )  ENTIRE RECORD     (  )  OUTSIDE IMMUNIZATIONS                 (  )  _______________         Please fax records to [facility], W Michael Ellerbe, MD, [phone]     If you have any questions, please contact Karen Hardik at [phone].           Patient Name: Tres Peace  : 1945  Patient Phone #: [phone]

## 2019-10-13 NOTE — PROGRESS NOTES
"Prior photo(s) of site(s) to confirm location(s):    ALLERGIES:   Compazine [prochlorperazine edisylate]      Current Outpatient Medications:     aspirin 81 MG Chew, Take 1 tablet (81 mg total) by mouth once daily., Disp: , Rfl:     atorvastatin (LIPITOR) 40 MG tablet, Take 1 tablet (40 mg total) by mouth once daily., Disp: 90 tablet, Rfl: 3    blood sugar diagnostic (ACCU-CHEK CON PLUS TEST STRP) Strp, TEST DAILY., Disp: 100 strip, Rfl: 1    BREO ELLIPTA 100-25 mcg/dose diskus inhaler, Inhale 1 puff into the lungs every other day. , Disp: , Rfl:     glipiZIDE (GLUCOTROL) 10 MG tablet, TAKE 1 TABLET BY MOUTH EVERY DAY, Disp: 90 tablet, Rfl: 0    insulin (LANTUS SOLOSTAR U-100 INSULIN) glargine 100 units/mL (3mL) SubQ pen, INJECT 18 UNITS INTO THE SKIN ONCE DAILY., Disp: 15 Syringe, Rfl: 0    losartan (COZAAR) 100 MG tablet, Take 1 tablet (100 mg total) by mouth once daily., Disp: 90 tablet, Rfl: 3    omeprazole (PRILOSEC) 40 MG capsule, TAKE 1 CAPSULE BY MOUTH EVERY DAY IN THE MORNING, Disp: 90 capsule, Rfl: 0    pen needle, diabetic (BD ULTRA-FINE EMERSON PEN NEEDLE) 32 gauge x 5/32" Ndle, USE DAILY WITH INJECTABLE INSULIN PEN, Disp: 100 each, Rfl: 3  -------------------------------------------------------------  PROCEDURE: Mohs' Micrographic Surgery    SITE: left cheek     INDICATION: basal cell carcinoma in an area at increased risk of recurrence    CASE NUMBER: [id number]      ANESTHETIC: 1 mL 1% Lidocaine with Epinephrine 1:100,000    SURGICAL PREP: Ethanol and ophthalmic Betadine    SURGEON: Kev Lopez MD    ASSISTANTS: Sanjay Mack CST     PREOPERATIVE DIAGNOSIS: basal cell carcinoma    POSTOPERATIVE DIAGNOSIS: basal cell carcinoma    PATHOLOGIC DIAGNOSIS: basal cell carcinoma    STAGES OF MOHS' SURGERY PERFORMED: one    TUMOR-FREE PLANE ACHIEVED: yes    HEMOSTASIS: Hyfrecation     SPECIMENS: one (one in stage A)    INITIAL LESION SIZE: 0.9 x 1.1 cm    FINAL DEFECT SIZE: 0.9 x 1.2 " cm    WOUND REPAIR/DISPOSITION: see below    NARRATIVE:    The patient is a 73 y.o.male referred by Sabina Martinez MD with a history of cancer on the left cheek adjacent to the lateral lower eyelid which was biopsied - pathology accession #[id number],  [facility]. Findings revealed basal cell carcinoma. Examination revealed an ill-defined scar on the left cheek at the site of prior biopsy, which was confirmed by reference to the photograph taken at the previous patient visit. In light of the nature of this tumor and the location on the cheek in the periorbital area, Mohs' micrographic surgery was thought to be the most appropriate management choice, and this diagnosis is appropriate for treatment by Mohs' micrographic surgery.  I discussed it with the patient and he fully understands the aims, risks, alternatives, and possible complications, and elects to proceed.  There are no medical or surgical contraindications to the procedure.     A signed informed consent was obtained.    PROCEDURE:  The patient was placed in the semi-recumbent position on the operating table in the Mohs' Surgery Suite. The area in question was thoroughly prepped with ethanol and ophthalmicBetadine. A sterile surgical marker was used to outline the clinically apparent margins of the involved area, and a narrow margin of normal-appearing skin. Reference marks were made at the periphery of the outlined area with the surgical marker. The proposed area of excision was measured and photographed. Local anesthesia as noted above was administered.  The total volume of anesthetic used throughout this portion of the procedure was as documented above. The area was prepared and draped in the standard manner. All of the grossly identifiable area of clinically abnormal tissue and an underlying/peripheral layer was taken and processed by the Mohs' technique.  Hemostasis was obtained with the hyfrecator. Tissue was taken from any areas of residual  "marginal involvement (if present) and processed by the Mohs' technique in as many stages as needed until a tumor-free plane was achieved.    Colors of inks used in the reference nicks at epidermal margins (if present) and/or inking of non-epithelial edges, if applicable, is represented on the Mohs map as follows: solid lines represent red ink, dots represent blue ink, jagged lines represent black ink, curlicues represent green ink, "xxx" represents yellow ink.    The first Mohs' layer consisted of one section(s) with 4 slide(s) evaluated. Histology of the specimen(s) showed, on the later cuts only, a focus of residual basal cell carcinoma, manifested as nests and strands of basaloid cells with hyperchromatic nuclei, peripheral palisading, and artifactual clefting around the nests/strands, in the superficial dermis near the green nick, as noted on the Mohs map; multiple earlier cuts were clear, and actual surgical margins were assessed as clear.     A total of one section(s) and 4 slide(s) were examined under the microscope via the Mohs technique.  A cancer free plane was reached after layer number one. Defect final size was as noted above.      The wound was covered with a nonadherent dressing between stages, and the patient allowed to wait in the waiting area during these periods. The final defect was photographed at the completion of the Mohs' procedure.    See the separate procedure note which follows regarding repair of the defect following Mohs' surgery.       -----------------------------------------------    REPAIR FOLLOWING MOHS' MICROGRAPHIC SURGERY    PREOPERATIVE DIAGNOSIS: defect following Mohs' surgery for a basal cell carcinoma    POSTOPERATIVE DIAGNOSIS: same    PROCEDURE PERFORMED: complex linear closure     ANESTHETIC: 3 mL 1% Lidocaine with Epinephrine 1:100,000    SURGICAL PREP: ophthalmic Betadine    SURGEON: Kev Lopez MD     ASSISTANTS: as above    LOCATION: left " cheek      INDICATIONS:  Earlier in the day, the patient underwent Mohs' micrographic surgical excision of a basal cell carcinoma on the left cheek. Tumor free margins were achieved after layer number one.  Later in the day, the management of the resulting wound was addressed with the patient. I discussed the various wound management options with the patient and he fully understands the aims, risks, alternatives, and possible complications of the alternatives, and he elects to proceed with closure of the defect in the manner noted below.  There are no medical or surgical contraindications to the procedure.    A signed informed consent was previously obtained.    PROCEDURE:  Repair via complex closure:  The patient was returned to the procedure room following completion of the Mohs' procedure and final slide review. Because of the size, shape and location of the defect, simple closure could not be achieved without possible distortion of surrounding structures, excessive tension on the wound margins and an unacceptable risk of wound dehiscence, and the creation of standing cone deformities. Consideration was given to the site of the wound, the surrounding structures, and the orientation of closure necessary to provide the optimal functional and cosmetic outcome. After devoting time to these considerations, and to the orientation of the vectors of maximal skin tension surrounding the defect, the area was prepped again and a fusiform closure was outlined on the skin surrounding the defect with a sterile surgical marker, to minimize tension across the wound. Additional anesthetic was infiltrated into the tissues surrounding the defect and the anticipated area of repair, to maintain anesthesia during the procedure. Preparation of the site for closure was then carried out by extending the defect through excision of triangles of superfluous tissue on either side of the wound to square the shoulders of the defect and to  allow closure without distortion by standing cone deformities, creating a fusiform defect measuring 0.9 x 3.5 cm in size.  Wound margins were extensively undermined to allow advancement of the wound margins into the defect and to permit closure with minimal tension. After hemostasis was achieved with the hyfrecator, closure was accomplished with:      multiple #5-0 buried interrupted Vicryl suture(s) and    multiple #5-0 vertical mattress, horizontal mattress, and simple interrupted Prolene suture(s) and    one #5-0 running locked Prolene suture(s) for final approximation of the wound margins.    Total length of the final closure was 3.5 cm.      The site was photographed following completion of the repair. Final dressing consisted of petrolatum, Telfa and tape.    Estimated blood loss for the total procedure was less than 5 mL.    Total operative time including tissue processing in the Mohs' laboratory and microscopic Mohs' frozen section slide review was 2 hour(s). Verbal and written wound care instructions were given to the patient, and he expressed understanding of these instructions. The patient tolerated the procedure well and left the operating room in good condition; he is to return in 7 days for suture removal.     Dr. Lopez's cell phone number was given to the patient with instructions to call prn with any problems.

## 2019-10-14 ENCOUNTER — PROCEDURE VISIT (OUTPATIENT)
Dept: DERMATOLOGY | Facility: CLINIC | Age: 74
End: 2019-10-14
Payer: MEDICARE

## 2019-10-14 VITALS
SYSTOLIC BLOOD PRESSURE: 130 MMHG | DIASTOLIC BLOOD PRESSURE: 73 MMHG | WEIGHT: 175 LBS | HEART RATE: 60 BPM | BODY MASS INDEX: 25.05 KG/M2 | HEIGHT: 70 IN

## 2019-10-14 DIAGNOSIS — C44.319 BASAL CELL CARCINOMA OF LEFT CHEEK: Primary | ICD-10-CM

## 2019-10-14 PROCEDURE — 13132 CMPLX RPR F/C/C/M/N/AX/G/H/F: CPT | Mod: HCNC,59,S$GLB, | Performed by: DERMATOLOGY

## 2019-10-14 PROCEDURE — 99499 UNLISTED E&M SERVICE: CPT | Mod: HCNC,S$GLB,, | Performed by: DERMATOLOGY

## 2019-10-14 PROCEDURE — 17311: ICD-10-PCS | Mod: HCNC,S$GLB,, | Performed by: DERMATOLOGY

## 2019-10-14 PROCEDURE — 99499 NO LOS: ICD-10-PCS | Mod: HCNC,S$GLB,, | Performed by: DERMATOLOGY

## 2019-10-14 PROCEDURE — 13132 PR RECMPL WND HEAD,FAC,HAND 2.6-7.5 CM: ICD-10-PCS | Mod: HCNC,59,S$GLB, | Performed by: DERMATOLOGY

## 2019-10-14 PROCEDURE — 17311 MOHS 1 STAGE H/N/HF/G: CPT | Mod: HCNC,S$GLB,, | Performed by: DERMATOLOGY

## 2019-10-16 DIAGNOSIS — E11.59 HYPERTENSION ASSOCIATED WITH DIABETES: ICD-10-CM

## 2019-10-16 DIAGNOSIS — K21.9 GASTROESOPHAGEAL REFLUX DISEASE, ESOPHAGITIS PRESENCE NOT SPECIFIED: Primary | ICD-10-CM

## 2019-10-16 DIAGNOSIS — E78.5 TYPE 2 DIABETES MELLITUS WITH HYPERLIPIDEMIA: ICD-10-CM

## 2019-10-16 DIAGNOSIS — I10 ESSENTIAL HYPERTENSION: ICD-10-CM

## 2019-10-16 DIAGNOSIS — I15.2 HYPERTENSION ASSOCIATED WITH DIABETES: ICD-10-CM

## 2019-10-16 DIAGNOSIS — E11.69 TYPE 2 DIABETES MELLITUS WITH HYPERLIPIDEMIA: ICD-10-CM

## 2019-10-17 RX ORDER — LOSARTAN POTASSIUM 100 MG/1
100 TABLET ORAL DAILY
Qty: 90 TABLET | Refills: 3 | OUTPATIENT
Start: 2019-10-17 | End: 2020-10-16

## 2019-10-17 RX ORDER — GLIPIZIDE 10 MG/1
10 TABLET ORAL DAILY
Qty: 90 TABLET | Refills: 0 | Status: SHIPPED | OUTPATIENT
Start: 2019-10-17 | End: 2020-01-10

## 2019-10-17 RX ORDER — ATORVASTATIN CALCIUM 40 MG/1
40 TABLET, FILM COATED ORAL DAILY
Qty: 90 TABLET | Refills: 1 | Status: SHIPPED | OUTPATIENT
Start: 2019-10-17 | End: 2020-04-15

## 2019-10-17 RX ORDER — OMEPRAZOLE 40 MG/1
CAPSULE, DELAYED RELEASE ORAL
Qty: 90 CAPSULE | Refills: 0 | Status: SHIPPED | OUTPATIENT
Start: 2019-10-17 | End: 2020-02-03

## 2019-10-17 RX ORDER — PEN NEEDLE, DIABETIC 30 GX3/16"
NEEDLE, DISPOSABLE MISCELLANEOUS
Qty: 100 EACH | Refills: 3 | Status: SHIPPED | OUTPATIENT
Start: 2019-10-17 | End: 2021-01-27 | Stop reason: SDUPTHER

## 2019-10-17 NOTE — PROGRESS NOTES
"Refill Authorization Note     is requesting a refill authorization.    Brief assessment and rationale for refill: ROUTE:  omeprazole (PRILOSEC) 40 MG capsule-NCO/OP; QUICK DC: losartan (COZAAR) 100 MG tablet-RTS 1/20; APPROVE: prr/FLOS  Name and strength of medication: atorvastatin (LIPITOR) 40 MG tablet/pen needle, diabetic (BD ULTRA-FINE EMERSON PEN NEEDLE) 32 gauge x 5/32" Ndle/glipiZIDE (GLUCOTROL) 10 MG tablet       Medication Therapy Plan: DM LCO course worsening-lov 6/19; A1C elevated 5/19; SCr WNL 5/19; Lipids WNL 5/19; route omeprazole-NCO/OP-ulcerative colitis; quick dc losartan-RTS 1/20; approve 6 more on atorvastatin/bd pen needles and 3 more on glipizide    Medication reconciliation completed: No              How patient will take medication: utd          Comments:   LAST LIPIDS 12 months  Lab Results   Component Value Date    CHOL 176 05/28/2019    CHOL 157 11/20/2018    CHOL 133 06/12/2018    Lab Results   Component Value Date    HDL 44 05/28/2019    HDL 37 (L) 11/20/2018    HDL 37 (L) 06/12/2018      Lab Results   Component Value Date    TRIG 95 05/28/2019    TRIG 103 11/20/2018    TRIG 83 06/12/2018    Lab Results   Component Value Date    CHOLHDL 25.0 05/28/2019    CHOLHDL 23.6 11/20/2018    CHOLHDL 27.8 06/12/2018       Lab Results   Component Value Date    LDLCALC 113.0 05/28/2019    LDLCALC 99.4 11/20/2018    LDLCALC 79.4 06/12/2018        LAST LFTs (12 months)   Lab Results   Component Value Date    ALT 18 05/28/2019    ALT 19 11/20/2018    ALT 22 06/12/2018    Lab Results   Component Value Date    AST 19 05/28/2019    AST 19 11/20/2018    AST 20 06/12/2018      Lab Results   Component Value Date    BILITOT 1.0 05/28/2019    BILITOT 0.9 11/20/2018    BILITOT 0.5 06/12/2018    LFT/TOTBILI-wnl, or within 3x the UNL     Diabetic Supplies Testing Frequency  QD    Last A1C: 6 months  Lab Results   Component Value Date    HGBA1C 7.4 (H) 05/28/2019    HGBA1C 7.0 (H) 11/20/2018    HGBA1C 7.6 (H) " 06/12/2018    No results found for: LABA1C     Last Kidney: 12 months  Lab Results   Component Value Date    CREATININE 1.0 05/28/2019    CREATININE 0.9 11/20/2018    CREATININE 0.9 06/12/2018          Appointments past 12m or future 3m    Date Provider   Last Visit   6/3/2019 LONI Torres MD   Next Visit   12/6/2019 LONI Torres MD

## 2019-10-17 NOTE — TELEPHONE ENCOUNTER
I have reviewed and agree with the assessment below with changes. GERD nco recently. Omeprazole filled for 90-day supply 7/19. FOVS(12/19). Approve 3 more.

## 2019-10-21 ENCOUNTER — OFFICE VISIT (OUTPATIENT)
Dept: DERMATOLOGY | Facility: CLINIC | Age: 74
End: 2019-10-21
Payer: MEDICARE

## 2019-10-21 DIAGNOSIS — Z48.02 VISIT FOR SUTURE REMOVAL: Primary | ICD-10-CM

## 2019-10-21 PROCEDURE — 99999 PR PBB SHADOW E&M-EST. PATIENT-LVL I: CPT | Mod: PBBFAC,HCNC,, | Performed by: DERMATOLOGY

## 2019-10-21 PROCEDURE — 99024 PR POST-OP FOLLOW-UP VISIT: ICD-10-PCS | Mod: HCNC,S$GLB,, | Performed by: DERMATOLOGY

## 2019-10-21 PROCEDURE — 99999 PR PBB SHADOW E&M-EST. PATIENT-LVL I: ICD-10-PCS | Mod: PBBFAC,HCNC,, | Performed by: DERMATOLOGY

## 2019-10-21 PROCEDURE — 99024 POSTOP FOLLOW-UP VISIT: CPT | Mod: HCNC,S$GLB,, | Performed by: DERMATOLOGY

## 2019-10-21 NOTE — PROGRESS NOTES
CC: 73 y.o.male patient is here for suture removal.     HPI: Patient is one week(s) s/p Mohs' micrographic surgery, fresh tissue technique of a basal cell carcinoma on the left cheek, with subsequent repair   Patient reports no problems.    EXAM:  Sutures intact.  Wound healing well.  Good approximation of skin edges.  No undue erythema to surrounding skin or signs or symptoms of infection.    IMPRESSION:  Healing well post Mohs' micrographic surgery and repair    PLAN:  Site cleaned with peroxide, sutures removed  Dressed with petrolatum   Reviewed further care and expected course  Followup 6 weeks; call prn sooner  Followup 4-5 months to Dr. Martinez

## 2019-11-01 ENCOUNTER — IMMUNIZATION (OUTPATIENT)
Dept: PHARMACY | Facility: CLINIC | Age: 74
End: 2019-11-01
Payer: MEDICARE

## 2019-11-15 DIAGNOSIS — E11.9 TYPE 2 DIABETES MELLITUS WITHOUT COMPLICATION, UNSPECIFIED WHETHER LONG TERM INSULIN USE: ICD-10-CM

## 2019-11-25 ENCOUNTER — LAB VISIT (OUTPATIENT)
Dept: LAB | Facility: HOSPITAL | Age: 74
End: 2019-11-25
Attending: FAMILY MEDICINE
Payer: MEDICARE

## 2019-11-25 DIAGNOSIS — I10 ESSENTIAL HYPERTENSION: ICD-10-CM

## 2019-11-25 DIAGNOSIS — E11.69 TYPE 2 DIABETES MELLITUS WITH HYPERLIPIDEMIA: ICD-10-CM

## 2019-11-25 DIAGNOSIS — E78.5 TYPE 2 DIABETES MELLITUS WITH HYPERLIPIDEMIA: ICD-10-CM

## 2019-11-25 DIAGNOSIS — E78.2 MIXED HYPERLIPIDEMIA: ICD-10-CM

## 2019-11-25 LAB
ALBUMIN SERPL BCP-MCNC: 4.1 G/DL (ref 3.5–5.2)
ALP SERPL-CCNC: 104 U/L (ref 55–135)
ALT SERPL W/O P-5'-P-CCNC: 17 U/L (ref 10–44)
ANION GAP SERPL CALC-SCNC: 6 MMOL/L (ref 8–16)
AST SERPL-CCNC: 19 U/L (ref 10–40)
BASOPHILS # BLD AUTO: 0.05 K/UL (ref 0–0.2)
BASOPHILS NFR BLD: 0.6 % (ref 0–1.9)
BILIRUB SERPL-MCNC: 0.6 MG/DL (ref 0.1–1)
BUN SERPL-MCNC: 16 MG/DL (ref 8–23)
CALCIUM SERPL-MCNC: 9.5 MG/DL (ref 8.7–10.5)
CHLORIDE SERPL-SCNC: 105 MMOL/L (ref 95–110)
CHOLEST SERPL-MCNC: 149 MG/DL (ref 120–199)
CHOLEST/HDLC SERPL: 3.3 {RATIO} (ref 2–5)
CO2 SERPL-SCNC: 30 MMOL/L (ref 23–29)
CREAT SERPL-MCNC: 1 MG/DL (ref 0.5–1.4)
DIFFERENTIAL METHOD: ABNORMAL
EOSINOPHIL # BLD AUTO: 0.2 K/UL (ref 0–0.5)
EOSINOPHIL NFR BLD: 2.4 % (ref 0–8)
ERYTHROCYTE [DISTWIDTH] IN BLOOD BY AUTOMATED COUNT: 13.7 % (ref 11.5–14.5)
EST. GFR  (AFRICAN AMERICAN): >60 ML/MIN/1.73 M^2
EST. GFR  (NON AFRICAN AMERICAN): >60 ML/MIN/1.73 M^2
ESTIMATED AVG GLUCOSE: 160 MG/DL (ref 68–131)
GLUCOSE SERPL-MCNC: 130 MG/DL (ref 70–110)
HBA1C MFR BLD HPLC: 7.2 % (ref 4–5.6)
HCT VFR BLD AUTO: 46.1 % (ref 40–54)
HDLC SERPL-MCNC: 45 MG/DL (ref 40–75)
HDLC SERPL: 30.2 % (ref 20–50)
HGB BLD-MCNC: 14.3 G/DL (ref 14–18)
IMM GRANULOCYTES # BLD AUTO: 0.04 K/UL (ref 0–0.04)
IMM GRANULOCYTES NFR BLD AUTO: 0.5 % (ref 0–0.5)
LDLC SERPL CALC-MCNC: 90 MG/DL (ref 63–159)
LYMPHOCYTES # BLD AUTO: 2.2 K/UL (ref 1–4.8)
LYMPHOCYTES NFR BLD: 27 % (ref 18–48)
MCH RBC QN AUTO: 28.8 PG (ref 27–31)
MCHC RBC AUTO-ENTMCNC: 31 G/DL (ref 32–36)
MCV RBC AUTO: 93 FL (ref 82–98)
MONOCYTES # BLD AUTO: 0.7 K/UL (ref 0.3–1)
MONOCYTES NFR BLD: 9.1 % (ref 4–15)
NEUTROPHILS # BLD AUTO: 4.9 K/UL (ref 1.8–7.7)
NEUTROPHILS NFR BLD: 60.4 % (ref 38–73)
NONHDLC SERPL-MCNC: 104 MG/DL
NRBC BLD-RTO: 0 /100 WBC
PLATELET # BLD AUTO: 252 K/UL (ref 150–350)
PMV BLD AUTO: 10.1 FL (ref 9.2–12.9)
POTASSIUM SERPL-SCNC: 4.5 MMOL/L (ref 3.5–5.1)
PROT SERPL-MCNC: 7.3 G/DL (ref 6–8.4)
RBC # BLD AUTO: 4.96 M/UL (ref 4.6–6.2)
SODIUM SERPL-SCNC: 141 MMOL/L (ref 136–145)
TRIGL SERPL-MCNC: 70 MG/DL (ref 30–150)
TSH SERPL DL<=0.005 MIU/L-ACNC: 1.83 UIU/ML (ref 0.4–4)
WBC # BLD AUTO: 8.05 K/UL (ref 3.9–12.7)

## 2019-11-25 PROCEDURE — 80061 LIPID PANEL: CPT | Mod: HCNC

## 2019-11-25 PROCEDURE — 80053 COMPREHEN METABOLIC PANEL: CPT | Mod: HCNC

## 2019-11-25 PROCEDURE — 85025 COMPLETE CBC W/AUTO DIFF WBC: CPT | Mod: HCNC

## 2019-11-25 PROCEDURE — 83036 HEMOGLOBIN GLYCOSYLATED A1C: CPT | Mod: HCNC

## 2019-11-25 PROCEDURE — 36415 COLL VENOUS BLD VENIPUNCTURE: CPT | Mod: HCNC,PO

## 2019-11-25 PROCEDURE — 84443 ASSAY THYROID STIM HORMONE: CPT | Mod: HCNC

## 2019-12-04 LAB
LEFT EYE DM RETINOPATHY: NEGATIVE
RIGHT EYE DM RETINOPATHY: NEGATIVE

## 2019-12-06 ENCOUNTER — TELEPHONE (OUTPATIENT)
Dept: ADMINISTRATIVE | Facility: HOSPITAL | Age: 74
End: 2019-12-06

## 2019-12-06 ENCOUNTER — OFFICE VISIT (OUTPATIENT)
Dept: FAMILY MEDICINE | Facility: CLINIC | Age: 74
End: 2019-12-06
Payer: MEDICARE

## 2019-12-06 ENCOUNTER — PATIENT OUTREACH (OUTPATIENT)
Dept: ADMINISTRATIVE | Facility: HOSPITAL | Age: 74
End: 2019-12-06

## 2019-12-06 VITALS
DIASTOLIC BLOOD PRESSURE: 88 MMHG | BODY MASS INDEX: 24.84 KG/M2 | HEIGHT: 70 IN | WEIGHT: 173.5 LBS | SYSTOLIC BLOOD PRESSURE: 138 MMHG | OXYGEN SATURATION: 97 % | HEART RATE: 59 BPM

## 2019-12-06 DIAGNOSIS — E78.5 HYPERLIPIDEMIA, UNSPECIFIED HYPERLIPIDEMIA TYPE: ICD-10-CM

## 2019-12-06 DIAGNOSIS — E78.5 TYPE 2 DIABETES MELLITUS WITH HYPERLIPIDEMIA: ICD-10-CM

## 2019-12-06 DIAGNOSIS — I10 ESSENTIAL HYPERTENSION: Primary | ICD-10-CM

## 2019-12-06 DIAGNOSIS — Z12.11 SCREEN FOR COLON CANCER: ICD-10-CM

## 2019-12-06 DIAGNOSIS — E11.69 TYPE 2 DIABETES MELLITUS WITH HYPERLIPIDEMIA: ICD-10-CM

## 2019-12-06 PROCEDURE — 3075F SYST BP GE 130 - 139MM HG: CPT | Mod: HCNC,CPTII,S$GLB, | Performed by: FAMILY MEDICINE

## 2019-12-06 PROCEDURE — 99999 PR PBB SHADOW E&M-EST. PATIENT-LVL III: CPT | Mod: PBBFAC,HCNC,, | Performed by: FAMILY MEDICINE

## 2019-12-06 PROCEDURE — 3075F PR MOST RECENT SYSTOLIC BLOOD PRESS GE 130-139MM HG: ICD-10-PCS | Mod: HCNC,CPTII,S$GLB, | Performed by: FAMILY MEDICINE

## 2019-12-06 PROCEDURE — 1126F PR PAIN SEVERITY QUANTIFIED, NO PAIN PRESENT: ICD-10-PCS | Mod: HCNC,S$GLB,, | Performed by: FAMILY MEDICINE

## 2019-12-06 PROCEDURE — 3079F PR MOST RECENT DIASTOLIC BLOOD PRESSURE 80-89 MM HG: ICD-10-PCS | Mod: HCNC,CPTII,S$GLB, | Performed by: FAMILY MEDICINE

## 2019-12-06 PROCEDURE — 1126F AMNT PAIN NOTED NONE PRSNT: CPT | Mod: HCNC,S$GLB,, | Performed by: FAMILY MEDICINE

## 2019-12-06 PROCEDURE — 3079F DIAST BP 80-89 MM HG: CPT | Mod: HCNC,CPTII,S$GLB, | Performed by: FAMILY MEDICINE

## 2019-12-06 PROCEDURE — 99214 PR OFFICE/OUTPT VISIT, EST, LEVL IV, 30-39 MIN: ICD-10-PCS | Mod: HCNC,S$GLB,, | Performed by: FAMILY MEDICINE

## 2019-12-06 PROCEDURE — 1101F PT FALLS ASSESS-DOCD LE1/YR: CPT | Mod: HCNC,CPTII,S$GLB, | Performed by: FAMILY MEDICINE

## 2019-12-06 PROCEDURE — 99999 PR PBB SHADOW E&M-EST. PATIENT-LVL III: ICD-10-PCS | Mod: PBBFAC,HCNC,, | Performed by: FAMILY MEDICINE

## 2019-12-06 PROCEDURE — 1159F PR MEDICATION LIST DOCUMENTED IN MEDICAL RECORD: ICD-10-PCS | Mod: HCNC,S$GLB,, | Performed by: FAMILY MEDICINE

## 2019-12-06 PROCEDURE — 1101F PR PT FALLS ASSESS DOC 0-1 FALLS W/OUT INJ PAST YR: ICD-10-PCS | Mod: HCNC,CPTII,S$GLB, | Performed by: FAMILY MEDICINE

## 2019-12-06 PROCEDURE — 99214 OFFICE O/P EST MOD 30 MIN: CPT | Mod: HCNC,S$GLB,, | Performed by: FAMILY MEDICINE

## 2019-12-06 PROCEDURE — 1159F MED LIST DOCD IN RCRD: CPT | Mod: HCNC,S$GLB,, | Performed by: FAMILY MEDICINE

## 2019-12-06 NOTE — PROGRESS NOTES
Subjective:       Patient ID: Tres Peace is a 73 y.o. male.    Chief Complaint: Hypertension    Here for f/u htn and diabetes.  Doing well overall.     Hypertension   This is a chronic problem. The current episode started more than 1 year ago. The problem is controlled. Pertinent negatives include no chest pain, palpitations or shortness of breath.   Diabetes   He presents for his follow-up diabetic visit. He has type 2 diabetes mellitus. His disease course has been improving. Pertinent negatives for hypoglycemia include no nervousness/anxiousness. Pertinent negatives for diabetes include no chest pain.   Hyperlipidemia   This is a chronic problem. The current episode started more than 1 year ago. The problem is controlled. Pertinent negatives include no chest pain or shortness of breath.     Review of Systems   Constitutional: Negative for chills and fever.   Respiratory: Negative for cough, chest tightness and shortness of breath.    Cardiovascular: Negative for chest pain, palpitations and leg swelling.   Endocrine: Negative for cold intolerance and heat intolerance.   Psychiatric/Behavioral: Negative for decreased concentration. The patient is not nervous/anxious.        Objective:      Physical Exam   Constitutional: He appears well-developed and well-nourished.   HENT:   Head: Normocephalic and atraumatic.   Cardiovascular: Normal rate, regular rhythm and normal heart sounds.   Pulmonary/Chest: Effort normal and breath sounds normal.   Psychiatric: He has a normal mood and affect.   Nursing note and vitals reviewed.      Assessment:       1. Essential hypertension    2. Hyperlipidemia, unspecified hyperlipidemia type    3. Type 2 diabetes mellitus with hyperlipidemia    4. Screen for colon cancer        Plan:       Essential hypertension  -     CBC auto differential; Future; Expected date: 12/06/2019  -     Comprehensive metabolic panel; Future; Expected date: 12/06/2019  -     Hemoglobin A1c; Future;  Expected date: 12/06/2019  -     Lipid panel; Future; Expected date: 12/06/2019  -     Microalbumin/creatinine urine ratio; Future; Expected date: 12/06/2019  -     TSH; Future; Expected date: 12/06/2019    Hyperlipidemia, unspecified hyperlipidemia type  -     CBC auto differential; Future; Expected date: 12/06/2019  -     Comprehensive metabolic panel; Future; Expected date: 12/06/2019  -     Hemoglobin A1c; Future; Expected date: 12/06/2019  -     Lipid panel; Future; Expected date: 12/06/2019  -     Microalbumin/creatinine urine ratio; Future; Expected date: 12/06/2019  -     TSH; Future; Expected date: 12/06/2019    Type 2 diabetes mellitus with hyperlipidemia  -     CBC auto differential; Future; Expected date: 12/06/2019  -     Comprehensive metabolic panel; Future; Expected date: 12/06/2019  -     Hemoglobin A1c; Future; Expected date: 12/06/2019  -     Lipid panel; Future; Expected date: 12/06/2019  -     Microalbumin/creatinine urine ratio; Future; Expected date: 12/06/2019  -     TSH; Future; Expected date: 12/06/2019    Screen for colon cancer  -     Case request GI: COLONOSCOPY      F/u with urology with repeat psa  htn stable  Diabetes improved  Lipid stable  Will monitor chronic medical issues and continue current plan of care.      Follow up in about 6 months (around 6/6/2020), or if symptoms worsen or fail to improve.

## 2019-12-09 ENCOUNTER — TELEPHONE (OUTPATIENT)
Dept: DERMATOLOGY | Facility: CLINIC | Age: 74
End: 2019-12-09

## 2019-12-09 NOTE — TELEPHONE ENCOUNTER
----- Message from Kev Lopez MD sent at 12/9/2019  2:10 PM CST -----  Ok; he doesn't need to come back to me. But he should see Dr. Martinez in 3-4 months or so.  Thanks.   ----- Message -----  From: Praveena Cooper CST  Sent: 12/9/2019  12:17 PM CST  To: Praveena Cooper, CST, Kev Lopez MD    Patient canceled his follow up visit on 12-5-19, I called him today and he did not want to reschedule, he said everything healed well .

## 2019-12-09 NOTE — TELEPHONE ENCOUNTER
Called patient to let him know that he did not have to come see Dr. Lopez , but he would like for him to follow up with Dr. Martinez in 3-4 months. Kellen

## 2019-12-12 DIAGNOSIS — I10 ESSENTIAL HYPERTENSION: ICD-10-CM

## 2019-12-12 RX ORDER — LOSARTAN POTASSIUM 100 MG/1
TABLET ORAL
Qty: 90 TABLET | Refills: 3 | OUTPATIENT
Start: 2019-12-12

## 2020-01-04 DIAGNOSIS — E11.69 TYPE 2 DIABETES MELLITUS WITH HYPERLIPIDEMIA: ICD-10-CM

## 2020-01-04 DIAGNOSIS — E78.5 TYPE 2 DIABETES MELLITUS WITH HYPERLIPIDEMIA: ICD-10-CM

## 2020-01-06 RX ORDER — INSULIN GLARGINE 100 [IU]/ML
INJECTION, SOLUTION SUBCUTANEOUS
Qty: 30 ML | Refills: 1 | Status: SHIPPED | OUTPATIENT
Start: 2020-01-06 | End: 2020-08-03

## 2020-01-06 NOTE — TELEPHONE ENCOUNTER
Refill Authorization Note     is requesting a refill authorization.                                              Comments:     Requested Prescriptions   Signed Prescriptions Disp Refills    insulin (LANTUS SOLOSTAR U-100 INSULIN) glargine 100 units/mL (3mL) SubQ pen 30 mL 1     Sig: INJECT 18 UNITS INTO THE SKIN ONCE DAILY.       Endocrinology:  Diabetes - Insulins Passed - 1/4/2020  1:42 AM        Passed - Patient is at least 18 years old        Passed - Office visit in past 12 months or future 90 days     Recent Outpatient Visits            1 month ago Essential hypertension    [facility] LONI Torres MD    2 months ago Visit for suture removal    [facility] Kev Lopez MD    6 months ago Elevated PSA    [facility] Salbador Palafox MD    7 months ago Wellness examination    [facility] LONI Torres MD    9 months ago Essential hypertension    [facility] Jeanne Cassidy NP          Future Appointments              In 4 months URINE [facility] [place]    In 4 months [place], [place] [facility] [place]    In 5 months LONI Torres MD [facility]                Passed - HBA1C is 7.9 or below and within 180 days     Hemoglobin A1C   Date Value Ref Range Status   11/25/2019 7.2 (H) 4.0 - 5.6 % Final     Comment:     ADA Screening Guidelines:  5.7-6.4%  Consistent with prediabetes  >or=6.5%  Consistent with diabetes  High levels of fetal hemoglobin interfere with the HbA1C  assay. Heterozygous hemoglobin variants (HbS, HgC, etc)do  not significantly interfere with this assay.   However, presence of multiple variants may affect accuracy.     05/28/2019 7.4 (H) 4.0 - 5.6 % Final     Comment:     ADA Screening Guidelines:  5.7-6.4%  Consistent with prediabetes  >or=6.5%  Consistent with diabetes  High levels of fetal hemoglobin interfere with  the HbA1C  assay. Heterozygous hemoglobin variants (HbS, HgC, etc)do  not significantly interfere with this assay.   However, presence of multiple variants may affect accuracy.     11/20/2018 7.0 (H) 4.0 - 5.6 % Final     Comment:     ADA Screening Guidelines:  5.7-6.4%  Consistent with prediabetes  >or=6.5%  Consistent with diabetes  High levels of fetal hemoglobin interfere with the HbA1C  assay. Heterozygous hemoglobin variants (HbS, HgC, etc)do  not significantly interfere with this assay.   However, presence of multiple variants may affect accuracy.                Passed - eGFR is 30 or above and within 360 days     eGFR if non    Date Value Ref Range Status   11/25/2019 >60.0 >60 mL/min/1.73 m^2 Final     Comment:     Calculation used to obtain the estimated glomerular filtration  rate (eGFR) is the CKD-EPI equation.      05/28/2019 >60.0 >60 mL/min/1.73 m^2 Final     Comment:     Calculation used to obtain the estimated glomerular filtration  rate (eGFR) is the CKD-EPI equation.      11/20/2018 >60.0 >60 mL/min/1.73 m^2 Final     Comment:     Calculation used to obtain the estimated glomerular filtration  rate (eGFR) is the CKD-EPI equation.        eGFR if    Date Value Ref Range Status   11/25/2019 >60.0 >60 mL/min/1.73 m^2 Final   05/28/2019 >60.0 >60 mL/min/1.73 m^2 Final   11/20/2018 >60.0 >60 mL/min/1.73 m^2 Final              Passed - Cr is 1.4 or below and within 360 days     Creatinine   Date Value Ref Range Status   11/25/2019 1.0 0.5 - 1.4 mg/dL Final   05/28/2019 1.0 0.5 - 1.4 mg/dL Final   11/20/2018 0.9 0.5 - 1.4 mg/dL Final

## 2020-01-10 DIAGNOSIS — E11.59 HYPERTENSION ASSOCIATED WITH DIABETES: ICD-10-CM

## 2020-01-10 DIAGNOSIS — E11.69 TYPE 2 DIABETES MELLITUS WITH HYPERLIPIDEMIA: ICD-10-CM

## 2020-01-10 DIAGNOSIS — I15.2 HYPERTENSION ASSOCIATED WITH DIABETES: ICD-10-CM

## 2020-01-10 DIAGNOSIS — E78.5 TYPE 2 DIABETES MELLITUS WITH HYPERLIPIDEMIA: ICD-10-CM

## 2020-01-10 RX ORDER — GLIPIZIDE 10 MG/1
TABLET ORAL
Qty: 90 TABLET | Refills: 1 | Status: SHIPPED | OUTPATIENT
Start: 2020-01-10 | End: 2020-02-03

## 2020-01-10 NOTE — PROGRESS NOTES
Refill Authorization Note     is requesting a refill authorization.    Brief assessment and rationale for refill: APPROVE: prr                                         Comments:     Requested Prescriptions   Signed Prescriptions Disp Refills    glipiZIDE (GLUCOTROL) 10 MG tablet 90 tablet 1     Sig: TAKE 1 TABLET BY MOUTH EVERY DAY       Endocrinology:  Diabetes - Sulfonylureas Passed - 1/10/2020  2:24 AM        Passed - Patient is at least 18 years old        Passed - Office visit in past 12 months or future 90 days     Recent Outpatient Visits            1 month ago Essential hypertension    [facility] LONI Torres MD    2 months ago Visit for suture removal    [facility] Kev Lopez MD    6 months ago Elevated PSA    [facility] Salbador Palafox MD    7 months ago Wellness examination    [facility] LONI Torres MD    9 months ago Essential hypertension    [facility] Jeanne Cassidy NP          Future Appointments              In 4 months URINE [facility] [place]    In 4 months LAB, [place] [facility] [place]    In 5 months LONI Torres MD [facility]                Passed - HBA1C is 7.9 or below and within 180 days     Hemoglobin A1C   Date Value Ref Range Status   11/25/2019 7.2 (H) 4.0 - 5.6 % Final     Comment:     ADA Screening Guidelines:  5.7-6.4%  Consistent with prediabetes  >or=6.5%  Consistent with diabetes  High levels of fetal hemoglobin interfere with the HbA1C  assay. Heterozygous hemoglobin variants (HbS, HgC, etc)do  not significantly interfere with this assay.   However, presence of multiple variants may affect accuracy.     05/28/2019 7.4 (H) 4.0 - 5.6 % Final     Comment:     ADA Screening Guidelines:  5.7-6.4%  Consistent with prediabetes  >or=6.5%  Consistent with diabetes  High levels of fetal hemoglobin  interfere with the HbA1C  assay. Heterozygous hemoglobin variants (HbS, HgC, etc)do  not significantly interfere with this assay.   However, presence of multiple variants may affect accuracy.     11/20/2018 7.0 (H) 4.0 - 5.6 % Final     Comment:     ADA Screening Guidelines:  5.7-6.4%  Consistent with prediabetes  >or=6.5%  Consistent with diabetes  High levels of fetal hemoglobin interfere with the HbA1C  assay. Heterozygous hemoglobin variants (HbS, HgC, etc)do  not significantly interfere with this assay.   However, presence of multiple variants may affect accuracy.                Passed - Cr is 1.4 or below and within 360 days     Creatinine   Date Value Ref Range Status   11/25/2019 1.0 0.5 - 1.4 mg/dL Final   05/28/2019 1.0 0.5 - 1.4 mg/dL Final   11/20/2018 0.9 0.5 - 1.4 mg/dL Final              Passed - eGFR is 30 or above and within 360 days     eGFR if non    Date Value Ref Range Status   11/25/2019 >60.0 >60 mL/min/1.73 m^2 Final     Comment:     Calculation used to obtain the estimated glomerular filtration  rate (eGFR) is the CKD-EPI equation.      05/28/2019 >60.0 >60 mL/min/1.73 m^2 Final     Comment:     Calculation used to obtain the estimated glomerular filtration  rate (eGFR) is the CKD-EPI equation.      11/20/2018 >60.0 >60 mL/min/1.73 m^2 Final     Comment:     Calculation used to obtain the estimated glomerular filtration  rate (eGFR) is the CKD-EPI equation.        eGFR if    Date Value Ref Range Status   11/25/2019 >60.0 >60 mL/min/1.73 m^2 Final   05/28/2019 >60.0 >60 mL/min/1.73 m^2 Final   11/20/2018 >60.0 >60 mL/min/1.73 m^2 Final

## 2020-01-30 DIAGNOSIS — I10 ESSENTIAL HYPERTENSION: ICD-10-CM

## 2020-01-30 DIAGNOSIS — E78.5 TYPE 2 DIABETES MELLITUS WITH HYPERLIPIDEMIA: ICD-10-CM

## 2020-01-30 DIAGNOSIS — I15.2 HYPERTENSION ASSOCIATED WITH DIABETES: ICD-10-CM

## 2020-01-30 DIAGNOSIS — E11.59 HYPERTENSION ASSOCIATED WITH DIABETES: ICD-10-CM

## 2020-01-30 DIAGNOSIS — E11.69 TYPE 2 DIABETES MELLITUS WITH HYPERLIPIDEMIA: ICD-10-CM

## 2020-01-30 DIAGNOSIS — K21.9 GASTROESOPHAGEAL REFLUX DISEASE, ESOPHAGITIS PRESENCE NOT SPECIFIED: ICD-10-CM

## 2020-02-03 RX ORDER — GLIPIZIDE 10 MG/1
TABLET ORAL
Qty: 90 TABLET | Refills: 1 | Status: SHIPPED | OUTPATIENT
Start: 2020-02-03 | End: 2020-06-09 | Stop reason: SDUPTHER

## 2020-02-03 RX ORDER — LOSARTAN POTASSIUM 100 MG/1
TABLET ORAL
Qty: 90 TABLET | Refills: 3 | Status: SHIPPED | OUTPATIENT
Start: 2020-02-03 | End: 2021-01-27 | Stop reason: SDUPTHER

## 2020-02-03 RX ORDER — OMEPRAZOLE 40 MG/1
CAPSULE, DELAYED RELEASE ORAL
Qty: 90 CAPSULE | Refills: 1 | Status: SHIPPED | OUTPATIENT
Start: 2020-02-03 | End: 2020-10-22

## 2020-02-03 NOTE — PROGRESS NOTES
Refill Authorization Note     is requesting a refill authorization.    Brief assessment and rationale for refill: APPROVE: prr                                         Comments:     Requested Prescriptions   Signed Prescriptions Disp Refills    omeprazole (PRILOSEC) 40 MG capsule 90 capsule 1     Sig: TAKE 1 CAPSULE EVERY MORNING       Gastroenterology: Proton Pump Inhibitors Passed - 1/30/2020  8:01 PM        Passed - Patient is at least 18 years old        Passed - Osteoporosis is not on problem list        Passed - Plavix is not on active medication list        Passed - An appropriate indication is on the problem list        Passed - Office visit in past 6 months or future 90 days.     Recent Outpatient Visits            1 month ago Essential hypertension    [facility] LONI Torres MD    3 months ago Visit for suture removal    [facility] Kev Lopez MD    7 months ago Elevated PSA    [facility] Salbador Palafox MD    8 months ago Wellness examination    [facility] LONI Torres MD    10 months ago Essential hypertension    [facility] Jeanne Cassidy, CANDACE          Future Appointments              In 4 months URINE [facility] [place]    In 4 months LAB, [place] [facility] [place]    In 4 months LONI Torres MD [facility]               glipiZIDE (GLUCOTROL) 10 MG tablet 90 tablet 1     Sig: TAKE 1 TABLET EVERY DAY       Endocrinology:  Diabetes - Sulfonylureas Passed - 1/30/2020  8:01 PM        Passed - Patient is at least 18 years old        Passed - Office visit in past 12 months or future 90 days     Recent Outpatient Visits            1 month ago Essential hypertension    [facility] LONI Torres MD    3 months ago Visit for suture removal    [facility] Kev Lopez MD    7 months ago  Elevated PSA    [facility] Salbador Palafox MD    8 months ago Wellness examination    [facility]I [facility]    10 months ago Essential hypertension    [facility]ne Cassidy NP          Future Appointments              In 4 months URINE [facility] [place]    In 4 months [place], [place] [facility] [place]    In 4 months LONI Torres MD [facility]                Passed - HBA1C is 7.9 or below and within 180 days     Hemoglobin A1C   Date Value Ref Range Status   11/25/2019 7.2 (H) 4.0 - 5.6 % Final     Comment:     ADA Screening Guidelines:  5.7-6.4%  Consistent with prediabetes  >or=6.5%  Consistent with diabetes  High levels of fetal hemoglobin interfere with the HbA1C  assay. Heterozygous hemoglobin variants (HbS, HgC, etc)do  not significantly interfere with this assay.   However, presence of multiple variants may affect accuracy.     05/28/2019 7.4 (H) 4.0 - 5.6 % Final     Comment:     ADA Screening Guidelines:  5.7-6.4%  Consistent with prediabetes  >or=6.5%  Consistent with diabetes  High levels of fetal hemoglobin interfere with the HbA1C  assay. Heterozygous hemoglobin variants (HbS, HgC, etc)do  not significantly interfere with this assay.   However, presence of multiple variants may affect accuracy.     11/20/2018 7.0 (H) 4.0 - 5.6 % Final     Comment:     ADA Screening Guidelines:  5.7-6.4%  Consistent with prediabetes  >or=6.5%  Consistent with diabetes  High levels of fetal hemoglobin interfere with the HbA1C  assay. Heterozygous hemoglobin variants (HbS, HgC, etc)do  not significantly interfere with this assay.   However, presence of multiple variants may affect accuracy.                Passed - Cr is 1.4 or below and within 360 days     Creatinine   Date Value Ref Range Status   11/25/2019 1.0 0.5 - 1.4 mg/dL Final   05/28/2019 1.0 0.5 - 1.4 mg/dL Final    11/20/2018 0.9 0.5 - 1.4 mg/dL Final              Passed - eGFR is 30 or above and within 360 days     eGFR if non    Date Value Ref Range Status   11/25/2019 >60.0 >60 mL/min/1.73 m^2 Final     Comment:     Calculation used to obtain the estimated glomerular filtration  rate (eGFR) is the CKD-EPI equation.      05/28/2019 >60.0 >60 mL/min/1.73 m^2 Final     Comment:     Calculation used to obtain the estimated glomerular filtration  rate (eGFR) is the CKD-EPI equation.      11/20/2018 >60.0 >60 mL/min/1.73 m^2 Final     Comment:     Calculation used to obtain the estimated glomerular filtration  rate (eGFR) is the CKD-EPI equation.        eGFR if    Date Value Ref Range Status   11/25/2019 >60.0 >60 mL/min/1.73 m^2 Final   05/28/2019 >60.0 >60 mL/min/1.73 m^2 Final   11/20/2018 >60.0 >60 mL/min/1.73 m^2 Final             losartan (COZAAR) 100 MG tablet 90 tablet 3     Sig: TAKE 1 TABLET EVERY DAY       Cardiovascular:  Angiotensin Receptor Blockers Passed - 1/30/2020  8:01 PM        Passed - Patient is at least 18 years old        Passed - Last BP in normal range within 360 days     BP Readings from Last 3 Encounters:   12/06/19 138/88   10/14/19 130/73   07/18/19 (!) 141/84              Passed - Office visit in past 12 months or future 90 days     Recent Outpatient Visits            1 month ago Essential hypertension    [facility] LONI Torres MD    3 months ago Visit for suture removal    [facility] Kev Lopez MD    7 months ago Elevated PSA    [facility] Salbador Palafox MD    8 months ago Wellness examination    [facility] LONI Torres MD    10 months ago Essential hypertension    [facility] Jeanne Cassidy NP          Future Appointments              [place] [facility] [place] [facility] [facility]    In 4 months [place], [place] [facility]  [place]    In 4 months LONI Torres MD [facility] [place]                Passed - Cr is 1.4 or below and within 360 days     Creatinine   Date Value Ref Range Status   11/25/2019 1.0 0.5 - 1.4 mg/dL Final   05/28/2019 1.0 0.5 - 1.4 mg/dL Final   11/20/2018 0.9 0.5 - 1.4 mg/dL Final              Passed - K in normal range and within 360 days     Potassium   Date Value Ref Range Status   11/25/2019 4.5 3.5 - 5.1 mmol/L Final   05/28/2019 4.5 3.5 - 5.1 mmol/L Final   11/20/2018 4.7 3.5 - 5.1 mmol/L Final              Passed - eGFR within 360 days     eGFR if non    Date Value Ref Range Status   11/25/2019 >60.0 >60 mL/min/1.73 m^2 Final     Comment:     Calculation used to obtain the estimated glomerular filtration  rate (eGFR) is the CKD-EPI equation.      05/28/2019 >60.0 >60 mL/min/1.73 m^2 Final     Comment:     Calculation used to obtain the estimated glomerular filtration  rate (eGFR) is the CKD-EPI equation.      11/20/2018 >60.0 >60 mL/min/1.73 m^2 Final     Comment:     Calculation used to obtain the estimated glomerular filtration  rate (eGFR) is the CKD-EPI equation.        eGFR if    Date Value Ref Range Status   11/25/2019 >60.0 >60 mL/min/1.73 m^2 Final   05/28/2019 >60.0 >60 mL/min/1.73 m^2 Final   11/20/2018 >60.0 >60 mL/min/1.73 m^2 Final

## 2020-03-13 ENCOUNTER — TELEPHONE (OUTPATIENT)
Dept: GASTROENTEROLOGY | Facility: CLINIC | Age: 75
End: 2020-03-13

## 2020-03-13 NOTE — TELEPHONE ENCOUNTER
Spoke with pt and scheduled ordered procedure. Prep instructions and reminder letter placed in mail, phone number provided for questions.    Procedure for June 16th. Cscope. Pt on Lantus insulin. Please notify pt with insulin instructions.

## 2020-03-13 NOTE — TELEPHONE ENCOUNTER
Informed pt that he would need to take 1/2 his insulin the day before his procedure and he would have to hold his diabetic medication and insulin the day of the procedure. Pt verbalized understanding. Sent another copy of his instructions and confirmation letter to his address on file.

## 2020-04-13 DIAGNOSIS — E11.69 TYPE 2 DIABETES MELLITUS WITH HYPERLIPIDEMIA: ICD-10-CM

## 2020-04-13 DIAGNOSIS — E78.5 TYPE 2 DIABETES MELLITUS WITH HYPERLIPIDEMIA: ICD-10-CM

## 2020-04-15 RX ORDER — ATORVASTATIN CALCIUM 40 MG/1
TABLET, FILM COATED ORAL
Qty: 90 TABLET | Refills: 2 | Status: SHIPPED | OUTPATIENT
Start: 2020-04-15 | End: 2021-01-27 | Stop reason: SDUPTHER

## 2020-04-15 NOTE — PROGRESS NOTES
Refill Authorization Note     is requesting a refill authorization.    Brief assessment and rationale for refill: APPROVE: PRR               Medication reconciliation completed: No                         Comments:   Refill Center Care Gap Closure protocols temporarily suspended.   Requested Prescriptions   Pending Prescriptions Disp Refills    atorvastatin (LIPITOR) 40 MG tablet [Pharmacy Med Name: ATORVASTATIN CALCIUM 40 MG Tablet] 90 tablet 2     Sig: TAKE 1 TABLET EVERY DAY       Cardiovascular:  Antilipid - Statins Passed - 4/15/2020  9:50 AM        Passed - Patient is at least 18 years old        Passed - Office visit in past 12 months or future 90 days.     Recent Outpatient Visits            4 months ago Essential hypertension    [facility] LONI Torres MD    5 months ago Visit for suture removal    [facility] Kev Lopez MD    9 months ago Elevated PSA    [facility] Salbador Palafox MD    10 months ago Wellness examination    [facility] LONI Torres MD    1 year ago Essential hypertension    [facility] Jeanne Cassidy NP          Future Appointments              In 1 month URINE [facility], [place]    In 1 month LAB, [place] [facility] [place]    In 1 month LONI Torres MD [facility]                Passed - Lipid Panel completed in last 360 days     Lab Results   Component Value Date    CHOL 149 11/25/2019    HDL 45 11/25/2019    LDLCALC 90.0 11/25/2019    TRIG 70 11/25/2019             Passed - ALT is 94 or below and within 360 days     ALT   Date Value Ref Range Status   11/25/2019 17 10 - 44 U/L Final   05/28/2019 18 10 - 44 U/L Final   11/20/2018 19 10 - 44 U/L Final              Passed - AST is 54 or below and within 360 days     AST   Date Value Ref Range Status   11/25/2019 19 10 - 40 U/L Final   05/28/2019 19  10 - 40 U/L Final   11/20/2018 19 10 - 40 U/L Final               Appointments  past 12m or future 3m with PCP    Date Provider   Last Visit   12/6/2019 LONI Torres MD   Next Visit   6/9/2020 LONI Torres MD   .  ED visits in past 90 days: 0       Note composed:9:55 AM 04/15/2020

## 2020-05-05 ENCOUNTER — PATIENT MESSAGE (OUTPATIENT)
Dept: ADMINISTRATIVE | Facility: HOSPITAL | Age: 75
End: 2020-05-05

## 2020-05-20 ENCOUNTER — TELEPHONE (OUTPATIENT)
Dept: GASTROENTEROLOGY | Facility: CLINIC | Age: 75
End: 2020-05-20

## 2020-05-20 DIAGNOSIS — Z01.812 PRE-PROCEDURE LAB EXAM: Primary | ICD-10-CM

## 2020-06-02 ENCOUNTER — LAB VISIT (OUTPATIENT)
Dept: LAB | Facility: HOSPITAL | Age: 75
End: 2020-06-02
Attending: FAMILY MEDICINE
Payer: MEDICARE

## 2020-06-02 DIAGNOSIS — E11.69 TYPE 2 DIABETES MELLITUS WITH HYPERLIPIDEMIA: ICD-10-CM

## 2020-06-02 DIAGNOSIS — E78.5 HYPERLIPIDEMIA, UNSPECIFIED HYPERLIPIDEMIA TYPE: ICD-10-CM

## 2020-06-02 DIAGNOSIS — I10 ESSENTIAL HYPERTENSION: ICD-10-CM

## 2020-06-02 DIAGNOSIS — E78.5 TYPE 2 DIABETES MELLITUS WITH HYPERLIPIDEMIA: ICD-10-CM

## 2020-06-02 LAB
ALBUMIN SERPL BCP-MCNC: 4.1 G/DL (ref 3.5–5.2)
ALP SERPL-CCNC: 98 U/L (ref 55–135)
ALT SERPL W/O P-5'-P-CCNC: 29 U/L (ref 10–44)
ANION GAP SERPL CALC-SCNC: 6 MMOL/L (ref 8–16)
AST SERPL-CCNC: 31 U/L (ref 10–40)
BASOPHILS # BLD AUTO: 0.03 K/UL (ref 0–0.2)
BASOPHILS NFR BLD: 0.4 % (ref 0–1.9)
BILIRUB SERPL-MCNC: 0.5 MG/DL (ref 0.1–1)
BUN SERPL-MCNC: 16 MG/DL (ref 8–23)
CALCIUM SERPL-MCNC: 8.6 MG/DL (ref 8.7–10.5)
CHLORIDE SERPL-SCNC: 104 MMOL/L (ref 95–110)
CHOLEST SERPL-MCNC: 147 MG/DL (ref 120–199)
CHOLEST/HDLC SERPL: 3.1 {RATIO} (ref 2–5)
CO2 SERPL-SCNC: 28 MMOL/L (ref 23–29)
CREAT SERPL-MCNC: 1 MG/DL (ref 0.5–1.4)
DIFFERENTIAL METHOD: ABNORMAL
EOSINOPHIL # BLD AUTO: 0.2 K/UL (ref 0–0.5)
EOSINOPHIL NFR BLD: 2.8 % (ref 0–8)
ERYTHROCYTE [DISTWIDTH] IN BLOOD BY AUTOMATED COUNT: 13.6 % (ref 11.5–14.5)
EST. GFR  (AFRICAN AMERICAN): >60 ML/MIN/1.73 M^2
EST. GFR  (NON AFRICAN AMERICAN): >60 ML/MIN/1.73 M^2
GLUCOSE SERPL-MCNC: 126 MG/DL (ref 70–110)
HCT VFR BLD AUTO: 46.7 % (ref 40–54)
HDLC SERPL-MCNC: 47 MG/DL (ref 40–75)
HDLC SERPL: 32 % (ref 20–50)
HGB BLD-MCNC: 14.4 G/DL (ref 14–18)
IMM GRANULOCYTES # BLD AUTO: 0.04 K/UL (ref 0–0.04)
IMM GRANULOCYTES NFR BLD AUTO: 0.6 % (ref 0–0.5)
LDLC SERPL CALC-MCNC: 82 MG/DL (ref 63–159)
LYMPHOCYTES # BLD AUTO: 2.2 K/UL (ref 1–4.8)
LYMPHOCYTES NFR BLD: 29.9 % (ref 18–48)
MCH RBC QN AUTO: 29.4 PG (ref 27–31)
MCHC RBC AUTO-ENTMCNC: 30.8 G/DL (ref 32–36)
MCV RBC AUTO: 96 FL (ref 82–98)
MONOCYTES # BLD AUTO: 0.6 K/UL (ref 0.3–1)
MONOCYTES NFR BLD: 8.4 % (ref 4–15)
NEUTROPHILS # BLD AUTO: 4.2 K/UL (ref 1.8–7.7)
NEUTROPHILS NFR BLD: 57.9 % (ref 38–73)
NONHDLC SERPL-MCNC: 100 MG/DL
NRBC BLD-RTO: 0 /100 WBC
PLATELET # BLD AUTO: 212 K/UL (ref 150–350)
PMV BLD AUTO: 10.7 FL (ref 9.2–12.9)
POTASSIUM SERPL-SCNC: 4.4 MMOL/L (ref 3.5–5.1)
PROT SERPL-MCNC: 7.3 G/DL (ref 6–8.4)
RBC # BLD AUTO: 4.89 M/UL (ref 4.6–6.2)
SODIUM SERPL-SCNC: 138 MMOL/L (ref 136–145)
TRIGL SERPL-MCNC: 90 MG/DL (ref 30–150)
WBC # BLD AUTO: 7.18 K/UL (ref 3.9–12.7)

## 2020-06-02 PROCEDURE — 36415 COLL VENOUS BLD VENIPUNCTURE: CPT | Mod: HCNC,PO

## 2020-06-02 PROCEDURE — 80053 COMPREHEN METABOLIC PANEL: CPT | Mod: HCNC

## 2020-06-02 PROCEDURE — 84443 ASSAY THYROID STIM HORMONE: CPT | Mod: HCNC

## 2020-06-02 PROCEDURE — 83036 HEMOGLOBIN GLYCOSYLATED A1C: CPT | Mod: HCNC

## 2020-06-02 PROCEDURE — 80061 LIPID PANEL: CPT | Mod: HCNC

## 2020-06-02 PROCEDURE — 85025 COMPLETE CBC W/AUTO DIFF WBC: CPT | Mod: HCNC

## 2020-06-03 ENCOUNTER — PATIENT MESSAGE (OUTPATIENT)
Dept: FAMILY MEDICINE | Facility: CLINIC | Age: 75
End: 2020-06-03

## 2020-06-03 LAB
ESTIMATED AVG GLUCOSE: 148 MG/DL (ref 68–131)
HBA1C MFR BLD HPLC: 6.8 % (ref 4–5.6)
TSH SERPL DL<=0.005 MIU/L-ACNC: 1.9 UIU/ML (ref 0.4–4)

## 2020-06-09 ENCOUNTER — OFFICE VISIT (OUTPATIENT)
Dept: FAMILY MEDICINE | Facility: CLINIC | Age: 75
End: 2020-06-09
Payer: MEDICARE

## 2020-06-09 ENCOUNTER — LAB VISIT (OUTPATIENT)
Dept: LAB | Facility: HOSPITAL | Age: 75
End: 2020-06-09
Attending: UROLOGY
Payer: MEDICARE

## 2020-06-09 VITALS
WEIGHT: 181.88 LBS | HEIGHT: 70 IN | SYSTOLIC BLOOD PRESSURE: 132 MMHG | TEMPERATURE: 98 F | BODY MASS INDEX: 26.04 KG/M2 | OXYGEN SATURATION: 96 % | HEART RATE: 63 BPM | DIASTOLIC BLOOD PRESSURE: 80 MMHG

## 2020-06-09 DIAGNOSIS — E78.5 TYPE 2 DIABETES MELLITUS WITH HYPERLIPIDEMIA: ICD-10-CM

## 2020-06-09 DIAGNOSIS — E11.59 HYPERTENSION ASSOCIATED WITH DIABETES: ICD-10-CM

## 2020-06-09 DIAGNOSIS — R97.20 ELEVATED PSA: ICD-10-CM

## 2020-06-09 DIAGNOSIS — I15.2 HYPERTENSION ASSOCIATED WITH DIABETES: ICD-10-CM

## 2020-06-09 DIAGNOSIS — Z00.00 WELLNESS EXAMINATION: Primary | ICD-10-CM

## 2020-06-09 DIAGNOSIS — E11.69 TYPE 2 DIABETES MELLITUS WITH OTHER SPECIFIED COMPLICATION, WITH LONG-TERM CURRENT USE OF INSULIN: ICD-10-CM

## 2020-06-09 DIAGNOSIS — E11.69 TYPE 2 DIABETES MELLITUS WITH HYPERLIPIDEMIA: ICD-10-CM

## 2020-06-09 DIAGNOSIS — I10 ESSENTIAL HYPERTENSION: ICD-10-CM

## 2020-06-09 DIAGNOSIS — Z79.4 TYPE 2 DIABETES MELLITUS WITH OTHER SPECIFIED COMPLICATION, WITH LONG-TERM CURRENT USE OF INSULIN: ICD-10-CM

## 2020-06-09 DIAGNOSIS — E78.5 HYPERLIPIDEMIA, UNSPECIFIED HYPERLIPIDEMIA TYPE: ICD-10-CM

## 2020-06-09 LAB
PROSTATE SPECIFIC ANTIGEN, TOTAL: 1.4 NG/ML (ref 0–4)
PSA FREE MFR SERPL: 39.29 %
PSA FREE SERPL-MCNC: 0.55 NG/ML (ref 0.01–1.5)

## 2020-06-09 PROCEDURE — 99397 PER PM REEVAL EST PAT 65+ YR: CPT | Mod: HCNC,S$GLB,, | Performed by: FAMILY MEDICINE

## 2020-06-09 PROCEDURE — 36415 COLL VENOUS BLD VENIPUNCTURE: CPT | Mod: HCNC,PO

## 2020-06-09 PROCEDURE — 99999 PR PBB SHADOW E&M-EST. PATIENT-LVL III: ICD-10-PCS | Mod: PBBFAC,HCNC,, | Performed by: FAMILY MEDICINE

## 2020-06-09 PROCEDURE — 3079F DIAST BP 80-89 MM HG: CPT | Mod: HCNC,CPTII,S$GLB, | Performed by: FAMILY MEDICINE

## 2020-06-09 PROCEDURE — 3044F PR MOST RECENT HEMOGLOBIN A1C LEVEL <7.0%: ICD-10-PCS | Mod: HCNC,CPTII,S$GLB, | Performed by: FAMILY MEDICINE

## 2020-06-09 PROCEDURE — 99999 PR PBB SHADOW E&M-EST. PATIENT-LVL III: CPT | Mod: PBBFAC,HCNC,, | Performed by: FAMILY MEDICINE

## 2020-06-09 PROCEDURE — 3079F PR MOST RECENT DIASTOLIC BLOOD PRESSURE 80-89 MM HG: ICD-10-PCS | Mod: HCNC,CPTII,S$GLB, | Performed by: FAMILY MEDICINE

## 2020-06-09 PROCEDURE — 99499 RISK ADDL DX/OHS AUDIT: ICD-10-PCS | Mod: HCNC,S$GLB,, | Performed by: FAMILY MEDICINE

## 2020-06-09 PROCEDURE — 99499 UNLISTED E&M SERVICE: CPT | Mod: HCNC,S$GLB,, | Performed by: FAMILY MEDICINE

## 2020-06-09 PROCEDURE — 3044F HG A1C LEVEL LT 7.0%: CPT | Mod: HCNC,CPTII,S$GLB, | Performed by: FAMILY MEDICINE

## 2020-06-09 PROCEDURE — 3075F PR MOST RECENT SYSTOLIC BLOOD PRESS GE 130-139MM HG: ICD-10-PCS | Mod: HCNC,CPTII,S$GLB, | Performed by: FAMILY MEDICINE

## 2020-06-09 PROCEDURE — 84154 ASSAY OF PSA FREE: CPT | Mod: HCNC

## 2020-06-09 PROCEDURE — 3075F SYST BP GE 130 - 139MM HG: CPT | Mod: HCNC,CPTII,S$GLB, | Performed by: FAMILY MEDICINE

## 2020-06-09 PROCEDURE — 99397 PR PREVENTIVE VISIT,EST,65 & OVER: ICD-10-PCS | Mod: HCNC,S$GLB,, | Performed by: FAMILY MEDICINE

## 2020-06-09 RX ORDER — GLIPIZIDE 10 MG/1
10 TABLET ORAL DAILY
Qty: 90 TABLET | Refills: 3 | Status: SHIPPED | OUTPATIENT
Start: 2020-06-09 | End: 2021-07-07

## 2020-06-09 NOTE — PROGRESS NOTES
Subjective:       Patient ID: Tres Peace is a 74 y.o. male.    Chief Complaint: Follow-up    Here for wellness and f/u htn and dm II. Doing well overall. Had recent labs.     Hypertension   This is a chronic problem. The current episode started more than 1 year ago. The problem is controlled. Pertinent negatives include no chest pain, palpitations or shortness of breath.   Hyperlipidemia   This is a chronic problem. The current episode started more than 1 year ago. The problem is controlled. Pertinent negatives include no chest pain or shortness of breath.   Diabetes   He presents for his follow-up diabetic visit. He has type 2 diabetes mellitus. His disease course has been improving. Pertinent negatives for hypoglycemia include no nervousness/anxiousness. Pertinent negatives for diabetes include no chest pain.     Review of Systems   Constitutional: Negative for chills and fever.   Respiratory: Negative for cough, chest tightness and shortness of breath.    Cardiovascular: Negative for chest pain, palpitations and leg swelling.   Endocrine: Negative for cold intolerance and heat intolerance.   Psychiatric/Behavioral: Negative for decreased concentration. The patient is not nervous/anxious.        Objective:      Physical Exam   Constitutional: He appears well-developed and well-nourished.   HENT:   Head: Normocephalic and atraumatic.   Cardiovascular: Normal rate, regular rhythm and normal heart sounds.   Pulmonary/Chest: Effort normal and breath sounds normal.   Psychiatric: He has a normal mood and affect.   Nursing note and vitals reviewed.      Assessment:       1. Wellness examination    2. Hyperlipidemia, unspecified hyperlipidemia type    3. Essential hypertension    4. Type 2 diabetes mellitus with hyperlipidemia    5. Hypertension associated with diabetes    6. Type 2 diabetes mellitus with other specified complication, with long-term current use of insulin        Plan:       Wellness  examination    Hyperlipidemia, unspecified hyperlipidemia type  -     CBC auto differential; Future; Expected date: 06/09/2020  -     Comprehensive metabolic panel; Future; Expected date: 06/09/2020  -     Hemoglobin A1C; Future; Expected date: 06/09/2020  -     Lipid Panel; Future; Expected date: 06/09/2020  -     TSH; Future; Expected date: 06/09/2020  -     Microalbumin/creatinine urine ratio; Future; Expected date: 06/09/2020    Essential hypertension    Type 2 diabetes mellitus with hyperlipidemia  -     glipiZIDE (GLUCOTROL) 10 MG tablet; Take 1 tablet (10 mg total) by mouth once daily.  Dispense: 90 tablet; Refill: 3    Hypertension associated with diabetes  -     glipiZIDE (GLUCOTROL) 10 MG tablet; Take 1 tablet (10 mg total) by mouth once daily.  Dispense: 90 tablet; Refill: 3  -     CBC auto differential; Future; Expected date: 06/09/2020  -     Comprehensive metabolic panel; Future; Expected date: 06/09/2020  -     Hemoglobin A1C; Future; Expected date: 06/09/2020  -     Lipid Panel; Future; Expected date: 06/09/2020  -     TSH; Future; Expected date: 06/09/2020  -     Microalbumin/creatinine urine ratio; Future; Expected date: 06/09/2020    Type 2 diabetes mellitus with other specified complication, with long-term current use of insulin  -     CBC auto differential; Future; Expected date: 06/09/2020  -     Comprehensive metabolic panel; Future; Expected date: 06/09/2020  -     Hemoglobin A1C; Future; Expected date: 06/09/2020  -     Lipid Panel; Future; Expected date: 06/09/2020  -     TSH; Future; Expected date: 06/09/2020  -     Microalbumin/creatinine urine ratio; Future; Expected date: 06/09/2020      htn stable  Diabetes improved  Will monitor chronic medical issues and continue current plan of care.  cscope in September   Follow up in about 6 months (around 12/9/2020), or if symptoms worsen or fail to improve.

## 2020-06-12 ENCOUNTER — PATIENT MESSAGE (OUTPATIENT)
Dept: FAMILY MEDICINE | Facility: CLINIC | Age: 75
End: 2020-06-12

## 2020-07-31 DIAGNOSIS — E78.5 TYPE 2 DIABETES MELLITUS WITH HYPERLIPIDEMIA: ICD-10-CM

## 2020-07-31 DIAGNOSIS — E11.69 TYPE 2 DIABETES MELLITUS WITH HYPERLIPIDEMIA: ICD-10-CM

## 2020-07-31 NOTE — TELEPHONE ENCOUNTER
No new care gaps identified.  Powered by [employer]. Reference number: [id number]. 7/31/2020 12:29:28 AM   DAIJAT

## 2020-08-03 RX ORDER — INSULIN GLARGINE 100 [IU]/ML
INJECTION, SOLUTION SUBCUTANEOUS
Qty: 30 ML | Refills: 1 | Status: SHIPPED | OUTPATIENT
Start: 2020-08-03 | End: 2021-07-19 | Stop reason: SDUPTHER

## 2020-08-03 NOTE — PROGRESS NOTES
Refill Authorization Note    is requesting a refill authorization.    Brief assessment and rationale for refill: APPROVE: prr          Medication Therapy Plan: CDMR; Approve 6 more    Medication reconciliation completed: No                         Comments:      Orders Placed This Encounter    insulin (LANTUS SOLOSTAR U-100 INSULIN) glargine 100 units/mL (3mL) SubQ pen      Requested Prescriptions   Signed Prescriptions Disp Refills    insulin (LANTUS SOLOSTAR U-100 INSULIN) glargine 100 units/mL (3mL) SubQ pen 30 mL 1     Sig: INJECT 18 UNITS INTO THE SKIN ONCE DAILY.       Endocrinology:  Diabetes - Insulins Passed - 7/31/2020 12:29 AM        Passed - Patient is at least 18 years old        Passed - Office visit in past 12 months or future 90 days.     Recent Outpatient Visits            1 month ago Wellness examination    [facility] LONI Torres MD    8 months ago Essential hypertension    [facility] LONI Torres MD    9 months ago Visit for suture removal    [facility] Kev Lopez MD    1 year ago Elevated PSA    [facility] Salbador Palafox MD    1 year ago Wellness examination    [facility] Farrah Torres MD          Future Appointments              In 4 months LONI Torres MD [facility], [place]                Passed - HBA1C is 7.9 or below and within 180 days     Hemoglobin A1C   Date Value Ref Range Status   06/02/2020 6.8 (H) 4.0 - 5.6 % Final     Comment:     ADA Screening Guidelines:  5.7-6.4%  Consistent with prediabetes  >or=6.5%  Consistent with diabetes  High levels of fetal hemoglobin interfere with the HbA1C  assay. Heterozygous hemoglobin variants (HbS, HgC, etc)do  not significantly interfere with this assay.   However, presence of multiple variants may affect accuracy.     11/25/2019 7.2 (H) 4.0 - 5.6 % Final     Comment:     ADA Screening  Guidelines:  5.7-6.4%  Consistent with prediabetes  >or=6.5%  Consistent with diabetes  High levels of fetal hemoglobin interfere with the HbA1C  assay. Heterozygous hemoglobin variants (HbS, HgC, etc)do  not significantly interfere with this assay.   However, presence of multiple variants may affect accuracy.     05/28/2019 7.4 (H) 4.0 - 5.6 % Final     Comment:     ADA Screening Guidelines:  5.7-6.4%  Consistent with prediabetes  >or=6.5%  Consistent with diabetes  High levels of fetal hemoglobin interfere with the HbA1C  assay. Heterozygous hemoglobin variants (HbS, HgC, etc)do  not significantly interfere with this assay.   However, presence of multiple variants may affect accuracy.                Passed - eGFR is 30 or above and within 360 days     eGFR if non    Date Value Ref Range Status   06/02/2020 >60.0 >60 mL/min/1.73 m^2 Final     Comment:     Calculation used to obtain the estimated glomerular filtration  rate (eGFR) is the CKD-EPI equation.      11/25/2019 >60.0 >60 mL/min/1.73 m^2 Final     Comment:     Calculation used to obtain the estimated glomerular filtration  rate (eGFR) is the CKD-EPI equation.      05/28/2019 >60.0 >60 mL/min/1.73 m^2 Final     Comment:     Calculation used to obtain the estimated glomerular filtration  rate (eGFR) is the CKD-EPI equation.        eGFR if    Date Value Ref Range Status   06/02/2020 >60.0 >60 mL/min/1.73 m^2 Final   11/25/2019 >60.0 >60 mL/min/1.73 m^2 Final   05/28/2019 >60.0 >60 mL/min/1.73 m^2 Final              Passed - Cr is 1.3 or below and within 360 days     Creatinine   Date Value Ref Range Status   06/02/2020 1.0 0.5 - 1.4 mg/dL Final   11/25/2019 1.0 0.5 - 1.4 mg/dL Final   05/28/2019 1.0 0.5 - 1.4 mg/dL Final                  Appointments  past 12m or future 3m with PCP    Date Provider   Last Visit   6/9/2020 LONI Torres MD   Next Visit   12/10/2020 LONI Torres MD   ED visits in past 90 days: 0      Note composed:10:21 AM 08/03/2020

## 2020-08-31 ENCOUNTER — TELEPHONE (OUTPATIENT)
Dept: GASTROENTEROLOGY | Facility: CLINIC | Age: 75
End: 2020-08-31

## 2020-08-31 NOTE — TELEPHONE ENCOUNTER
----- Message from Bertha Lopez sent at 8/31/2020  3:24 PM CDT -----  Contact: self  Patient has a colonoscopy scheduled on 9/8 and he wants to push it to November or December.  Call back at [phone] (home).  Thanks

## 2020-09-01 ENCOUNTER — TELEPHONE (OUTPATIENT)
Dept: GASTROENTEROLOGY | Facility: CLINIC | Age: 75
End: 2020-09-01

## 2020-09-01 NOTE — TELEPHONE ENCOUNTER
Pt scheduled for cscope with Dr. Carvalho on 11/18 and will need instructions on insulin adjustments for Lantus prior to procedure. Please advise pt on this if needed. Thanks.

## 2020-09-29 ENCOUNTER — PATIENT MESSAGE (OUTPATIENT)
Dept: OTHER | Facility: OTHER | Age: 75
End: 2020-09-29

## 2020-10-22 DIAGNOSIS — K21.9 GASTROESOPHAGEAL REFLUX DISEASE: ICD-10-CM

## 2020-10-22 RX ORDER — OMEPRAZOLE 40 MG/1
CAPSULE, DELAYED RELEASE ORAL
Qty: 90 CAPSULE | Refills: 2 | Status: SHIPPED | OUTPATIENT
Start: 2020-10-22 | End: 2021-03-31

## 2020-10-22 NOTE — TELEPHONE ENCOUNTER
Care Due:                  Date            Visit Type   Department     Provider  --------------------------------------------------------------------------------                                ESTABLISHED                              PATIENT      [facility]  Last Visit: 06-      SHORT        RAVI MAURICE                              ESTABLISHED   [facility]  Next Visit: 12-      PATIENT      MEDICINE       LONI MAURICE                                                            Last  Test          Frequency    Reason                     Performed    Due Date  --------------------------------------------------------------------------------    HBA1C.......  6 months...  glipiZIDE, insulin.......  06- 11-    Powered by [employer]. Reference number: [id number]. 10/22/2020 2:31:20 PM   CDT

## 2020-10-23 NOTE — TELEPHONE ENCOUNTER
Provider Staff:  Action is required for this patient.   Please schedule patient for the following      Lab(s):  - A1c     Thanks!  [facility]      Appointments past 12m or future 3m with pcp    Date Provider   Last Visit   6/9/2020 LONI Torres MD   Next Visit   12/10/2020 LONI Torres MD     Note composed: 10/22/2020 7:38 PM

## 2020-10-23 NOTE — PROGRESS NOTES
Refill Authorization Note   Tres Peace is requesting a refill authorization.  Brief assessment and rationale for refill: Approve    -Medication-related problems identified: Requires labs  Medication Therapy Plan: CDMR: Lab (A1C)    Medication reconciliation completed: No   Comments:   Orders Placed This Encounter    omeprazole (PRILOSEC) 40 MG capsule      Requested Prescriptions   Signed Prescriptions Disp Refills    omeprazole (PRILOSEC) 40 MG capsule 90 capsule 2     Sig: TAKE 1 CAPSULE BY MOUTH EVERY DAY IN THE MORNING       Gastroenterology: Proton Pump Inhibitors Passed - 10/22/2020  2:30 PM        Passed - Patient is at least 18 years old        Passed - Osteoporosis is not on problem list        Passed - Plavix is not on active medication list        Passed - Office Visit within last 12 months or future 90 days.     Recent Outpatient Visits            4 months ago Wellness examination    [facility] LONI Torres MD    10 months ago Essential hypertension    [facility] LONI Torres MD    1 year ago Visit for suture removal    [facility] Kev Lopez MD    1 year ago Elevated PSA    [facility] Salbador Palafox MD    1 year ago Wellness examination    [facility] LONI Torres MD          Future Appointments              In 3 weeks COVID TESTING, [facility]    In 1 month LONI Torres MD [facility]                Passed - GERD is on problem list            Appointments  past 12m or future 3m with PCP    Date Provider   Last Visit   6/9/2020 LONI Torres MD   Next Visit   12/10/2020 LONI Torres MD   ED visits in past 90 days: 0     Note composed:7:38 PM 10/22/2020

## 2020-11-09 ENCOUNTER — TELEPHONE (OUTPATIENT)
Dept: GASTROENTEROLOGY | Facility: CLINIC | Age: 75
End: 2020-11-09

## 2020-11-09 NOTE — TELEPHONE ENCOUNTER
Rescheduled colonoscopy, mailed confirmation and instructions to address on file. Pt verbalized understanding.

## 2020-11-09 NOTE — TELEPHONE ENCOUNTER
----- Message from Paul Doshi sent at 11/9/2020 10:58 AM CST -----  Type: Needs Medical Advice  Who Called:  Patient    Best Call Back Number: [phone]  Additional Information: Patient states that he needs to reschedule his procedure to any day but 12/18/21  Please call to advise

## 2020-12-08 ENCOUNTER — TELEPHONE (OUTPATIENT)
Dept: FAMILY MEDICINE | Facility: CLINIC | Age: 75
End: 2020-12-08

## 2020-12-08 NOTE — TELEPHONE ENCOUNTER
----- Message from Jakob Shaver sent at 12/8/2020  9:13 AM CST -----  Regarding: appointment  Contact: self  Type: Needs Medical Advice  Who Called:  self  Symptoms (please be specific):    How long has patient had these symptoms:    Pharmacy name and phone #:    Best Call Back Number: [phone]  Additional Information: Patient requesting to switch VV to audio appointment for 12/10/2020

## 2020-12-09 DIAGNOSIS — E11.9 TYPE 2 DIABETES MELLITUS WITHOUT COMPLICATION, UNSPECIFIED WHETHER LONG TERM INSULIN USE: ICD-10-CM

## 2020-12-10 ENCOUNTER — TELEPHONE (OUTPATIENT)
Dept: FAMILY MEDICINE | Facility: CLINIC | Age: 75
End: 2020-12-10

## 2020-12-10 NOTE — TELEPHONE ENCOUNTER
----- Message from Sharmin  sent at 12/10/2020  9:19 AM CST -----  Regarding: VV  Contact: pt  Type: Needs Medical Advice    Who Called:      Best Call Back Number:     Additional Information: Requesting a call back from Nurse, regarding pt is setting up mychart on phone it was understood he was to get a audio call and didn't know he had to go through it pt is setup and ready to for his VV please call back ASAP

## 2020-12-10 NOTE — TELEPHONE ENCOUNTER
Spoke with the pt and rescheduled his visit for January with his pcp. He was told that as long as he was asymptotic for covid we can not test him. He and his wife were exposed to someone on Sunday that tested postive for covid. He was told to monitor his symptoms.

## 2020-12-10 NOTE — TELEPHONE ENCOUNTER
----- Message from Soledad Montilla sent at 12/10/2020 11:16 AM CST -----  Regarding: virture visit  Type: Needs Medical Advice  Who Called:  pt  Symptoms (please be specific):    How long has patient had these symptoms:    Pharmacy name and phone #:    Best Call Back Number: [phone] (home)     Additional Information: i have pt kareem beckham that was schedule for a virture appt this morning at 840 but he did not know he needed to set up my chart to be able to connect to the visit, he would like to know if he can still get a virture visit today he stated that him and his wife was exposed to covid 19 thanks

## 2020-12-11 ENCOUNTER — PATIENT MESSAGE (OUTPATIENT)
Dept: OTHER | Facility: OTHER | Age: 75
End: 2020-12-11

## 2020-12-11 ENCOUNTER — PATIENT MESSAGE (OUTPATIENT)
Dept: FAMILY MEDICINE | Facility: CLINIC | Age: 75
End: 2020-12-11

## 2020-12-14 ENCOUNTER — TELEPHONE (OUTPATIENT)
Dept: FAMILY MEDICINE | Facility: CLINIC | Age: 75
End: 2020-12-14

## 2020-12-14 NOTE — TELEPHONE ENCOUNTER
----- Message from Kalli Sánchez sent at 12/11/2020  9:58 AM CST -----  Regarding: COVID TEST  Contact: Patient  Type: Needs Medical Advice    Who Called: Patient  Best Call Back Number: [phone] (home)   Additional Info-Patient states that him and his wife Fabi 04[birth year]46 MRN [id number] both need to do a COVID test. Please Advise-Thank you~

## 2020-12-14 NOTE — TELEPHONE ENCOUNTER
----- Message from Augustina Khan sent at 12/12/2020  8:54 AM CST -----  Regarding: call back  Contact: [phone]  Type:  Patient Call Back    Who Called:pt  What this is regarding?: covid test  Would the patient rather a call back or a response via [facility]? Call back   Best Call Back Number:[phone]  Additional Information:     Advised to call back directly if there are further questions, or if these symptoms fail to improve as anticipated or worsen.

## 2020-12-28 ENCOUNTER — PATIENT MESSAGE (OUTPATIENT)
Dept: FAMILY MEDICINE | Facility: CLINIC | Age: 75
End: 2020-12-28

## 2021-01-04 ENCOUNTER — PATIENT MESSAGE (OUTPATIENT)
Dept: ADMINISTRATIVE | Facility: HOSPITAL | Age: 76
End: 2021-01-04

## 2021-01-08 ENCOUNTER — PATIENT OUTREACH (OUTPATIENT)
Dept: ADMINISTRATIVE | Facility: HOSPITAL | Age: 76
End: 2021-01-08

## 2021-01-20 ENCOUNTER — IMMUNIZATION (OUTPATIENT)
Dept: FAMILY MEDICINE | Facility: CLINIC | Age: 76
End: 2021-01-20
Payer: MEDICARE

## 2021-01-20 DIAGNOSIS — Z23 NEED FOR VACCINATION: Primary | ICD-10-CM

## 2021-01-20 PROCEDURE — 91300 COVID-19, MRNA, LNP-S, PF, 30 MCG/0.3 ML DOSE VACCINE: CPT | Mod: PBBFAC | Performed by: FAMILY MEDICINE

## 2021-01-27 ENCOUNTER — TELEPHONE (OUTPATIENT)
Dept: GASTROENTEROLOGY | Facility: CLINIC | Age: 76
End: 2021-01-27

## 2021-01-27 DIAGNOSIS — E11.69 TYPE 2 DIABETES MELLITUS WITH HYPERLIPIDEMIA: ICD-10-CM

## 2021-01-27 DIAGNOSIS — I10 ESSENTIAL HYPERTENSION: ICD-10-CM

## 2021-01-27 DIAGNOSIS — E78.5 TYPE 2 DIABETES MELLITUS WITH HYPERLIPIDEMIA: ICD-10-CM

## 2021-01-28 RX ORDER — ATORVASTATIN CALCIUM 40 MG/1
40 TABLET, FILM COATED ORAL DAILY
Qty: 90 TABLET | Refills: 1 | Status: SHIPPED | OUTPATIENT
Start: 2021-01-28 | End: 2021-01-28 | Stop reason: SDUPTHER

## 2021-01-28 RX ORDER — PEN NEEDLE, DIABETIC 30 GX3/16"
NEEDLE, DISPOSABLE MISCELLANEOUS
Qty: 100 EACH | Refills: 3 | Status: SHIPPED | OUTPATIENT
Start: 2021-01-28 | End: 2021-07-12 | Stop reason: SDUPTHER

## 2021-01-28 RX ORDER — ATORVASTATIN CALCIUM 40 MG/1
40 TABLET, FILM COATED ORAL DAILY
Qty: 90 TABLET | Refills: 1 | Status: SHIPPED | OUTPATIENT
Start: 2021-01-28 | End: 2021-07-07

## 2021-01-28 RX ORDER — LOSARTAN POTASSIUM 100 MG/1
100 TABLET ORAL DAILY
Qty: 90 TABLET | Refills: 1 | Status: SHIPPED | OUTPATIENT
Start: 2021-01-28 | End: 2021-01-28 | Stop reason: SDUPTHER

## 2021-01-28 RX ORDER — PEN NEEDLE, DIABETIC 30 GX3/16"
NEEDLE, DISPOSABLE MISCELLANEOUS
Qty: 100 EACH | Refills: 3 | Status: SHIPPED | OUTPATIENT
Start: 2021-01-28 | End: 2021-01-28 | Stop reason: SDUPTHER

## 2021-01-28 RX ORDER — LOSARTAN POTASSIUM 100 MG/1
100 TABLET ORAL DAILY
Qty: 90 TABLET | Refills: 1 | Status: SHIPPED | OUTPATIENT
Start: 2021-01-28 | End: 2021-07-07

## 2021-01-30 ENCOUNTER — LAB VISIT (OUTPATIENT)
Dept: FAMILY MEDICINE | Facility: CLINIC | Age: 76
End: 2021-01-30
Payer: MEDICARE

## 2021-01-30 DIAGNOSIS — Z01.812 PRE-PROCEDURE LAB EXAM: ICD-10-CM

## 2021-01-30 PROCEDURE — U0003 INFECTIOUS AGENT DETECTION BY NUCLEIC ACID (DNA OR RNA); SEVERE ACUTE RESPIRATORY SYNDROME CORONAVIRUS 2 (SARS-COV-2) (CORONAVIRUS DISEASE [COVID-19]), AMPLIFIED PROBE TECHNIQUE, MAKING USE OF HIGH THROUGHPUT TECHNOLOGIES AS DESCRIBED BY CMS-2020-01-R: HCPCS

## 2021-01-31 LAB — SARS-COV-2 RNA RESP QL NAA+PROBE: NOT DETECTED

## 2021-02-01 ENCOUNTER — ANESTHESIA EVENT (OUTPATIENT)
Dept: ENDOSCOPY | Facility: HOSPITAL | Age: 76
End: 2021-02-01
Payer: MEDICARE

## 2021-02-02 ENCOUNTER — ANESTHESIA (OUTPATIENT)
Dept: ENDOSCOPY | Facility: HOSPITAL | Age: 76
End: 2021-02-02
Payer: MEDICARE

## 2021-02-02 ENCOUNTER — HOSPITAL ENCOUNTER (OUTPATIENT)
Facility: HOSPITAL | Age: 76
Discharge: HOME | End: 2021-02-02
Attending: INTERNAL MEDICINE | Admitting: INTERNAL MEDICINE
Payer: MEDICARE

## 2021-02-02 ENCOUNTER — PATIENT MESSAGE (OUTPATIENT)
Dept: FAMILY MEDICINE | Facility: CLINIC | Age: 76
End: 2021-02-02

## 2021-02-02 DIAGNOSIS — Z86.010 HX OF COLONIC POLYPS: ICD-10-CM

## 2021-02-02 PROBLEM — Z86.0100 HX OF COLONIC POLYPS: Status: ACTIVE | Noted: 2021-02-02

## 2021-02-02 LAB — GLUCOSE SERPL-MCNC: 130 MG/DL (ref 70–110)

## 2021-02-02 PROCEDURE — 37000009 HC ANESTHESIA EA ADD 15 MINS: Mod: PO | Performed by: INTERNAL MEDICINE

## 2021-02-02 PROCEDURE — 25000003 PHARM REV CODE 250: Mod: PO | Performed by: NURSE ANESTHETIST, CERTIFIED REGISTERED

## 2021-02-02 PROCEDURE — 37000008 HC ANESTHESIA 1ST 15 MINUTES: Mod: PO | Performed by: INTERNAL MEDICINE

## 2021-02-02 PROCEDURE — D9220A PRA ANESTHESIA: ICD-10-PCS | Mod: PT,CRNA,, | Performed by: NURSE ANESTHETIST, CERTIFIED REGISTERED

## 2021-02-02 PROCEDURE — 45380 COLONOSCOPY AND BIOPSY: CPT | Mod: PO | Performed by: INTERNAL MEDICINE

## 2021-02-02 PROCEDURE — 88305 TISSUE EXAM BY PATHOLOGIST: ICD-10-PCS | Mod: 26,,, | Performed by: PATHOLOGY

## 2021-02-02 PROCEDURE — 45380 COLONOSCOPY AND BIOPSY: CPT | Mod: PT,,, | Performed by: INTERNAL MEDICINE

## 2021-02-02 PROCEDURE — D9220A PRA ANESTHESIA: Mod: PT,CRNA,, | Performed by: NURSE ANESTHETIST, CERTIFIED REGISTERED

## 2021-02-02 PROCEDURE — 63600175 PHARM REV CODE 636 W HCPCS: Mod: PO | Performed by: INTERNAL MEDICINE

## 2021-02-02 PROCEDURE — 63600175 PHARM REV CODE 636 W HCPCS: Mod: PO | Performed by: NURSE ANESTHETIST, CERTIFIED REGISTERED

## 2021-02-02 PROCEDURE — 82962 GLUCOSE BLOOD TEST: CPT | Mod: PO | Performed by: INTERNAL MEDICINE

## 2021-02-02 PROCEDURE — D9220A PRA ANESTHESIA: Mod: PT,ANES,, | Performed by: ANESTHESIOLOGY

## 2021-02-02 PROCEDURE — 88305 TISSUE EXAM BY PATHOLOGIST: CPT | Mod: 59 | Performed by: PATHOLOGY

## 2021-02-02 PROCEDURE — 27201012 HC FORCEPS, HOT/COLD, DISP: Mod: PO | Performed by: INTERNAL MEDICINE

## 2021-02-02 PROCEDURE — D9220A PRA ANESTHESIA: ICD-10-PCS | Mod: PT,ANES,, | Performed by: ANESTHESIOLOGY

## 2021-02-02 PROCEDURE — 45380 PR COLONOSCOPY,BIOPSY: ICD-10-PCS | Mod: PT,,, | Performed by: INTERNAL MEDICINE

## 2021-02-02 PROCEDURE — 88305 TISSUE EXAM BY PATHOLOGIST: CPT | Mod: 26,,, | Performed by: PATHOLOGY

## 2021-02-02 RX ORDER — PROPOFOL 10 MG/ML
VIAL (ML) INTRAVENOUS CONTINUOUS PRN
Status: DISCONTINUED | OUTPATIENT
Start: 2021-02-02 | End: 2021-02-02

## 2021-02-02 RX ORDER — SODIUM CHLORIDE 0.9 % (FLUSH) 0.9 %
10 SYRINGE (ML) INJECTION
Status: DISCONTINUED | OUTPATIENT
Start: 2021-02-02 | End: 2021-02-02 | Stop reason: HOSPADM

## 2021-02-02 RX ORDER — PROPOFOL 10 MG/ML
VIAL (ML) INTRAVENOUS
Status: DISCONTINUED | OUTPATIENT
Start: 2021-02-02 | End: 2021-02-02

## 2021-02-02 RX ORDER — SODIUM CHLORIDE, SODIUM LACTATE, POTASSIUM CHLORIDE, CALCIUM CHLORIDE 600; 310; 30; 20 MG/100ML; MG/100ML; MG/100ML; MG/100ML
INJECTION, SOLUTION INTRAVENOUS CONTINUOUS
Status: DISCONTINUED | OUTPATIENT
Start: 2021-02-02 | End: 2021-02-02 | Stop reason: HOSPADM

## 2021-02-02 RX ORDER — LIDOCAINE HCL/PF 100 MG/5ML
SYRINGE (ML) INTRAVENOUS
Status: DISCONTINUED | OUTPATIENT
Start: 2021-02-02 | End: 2021-02-02

## 2021-02-02 RX ADMIN — PROPOFOL 100 MG: 10 INJECTION, EMULSION INTRAVENOUS at 11:02

## 2021-02-02 RX ADMIN — SODIUM CHLORIDE, SODIUM LACTATE, POTASSIUM CHLORIDE, AND CALCIUM CHLORIDE: .6; .31; .03; .02 INJECTION, SOLUTION INTRAVENOUS at 10:02

## 2021-02-02 RX ADMIN — PROPOFOL 150 MCG/KG/MIN: 10 INJECTION, EMULSION INTRAVENOUS at 11:02

## 2021-02-02 RX ADMIN — LIDOCAINE HYDROCHLORIDE 100 MG: 20 INJECTION, SOLUTION INTRAVENOUS at 11:02

## 2021-02-04 VITALS
BODY MASS INDEX: 25.48 KG/M2 | TEMPERATURE: 98 F | HEIGHT: 70 IN | WEIGHT: 178 LBS | HEART RATE: 59 BPM | OXYGEN SATURATION: 98 % | DIASTOLIC BLOOD PRESSURE: 73 MMHG | RESPIRATION RATE: 16 BRPM | SYSTOLIC BLOOD PRESSURE: 119 MMHG

## 2021-02-09 LAB
FINAL PATHOLOGIC DIAGNOSIS: NORMAL
GROSS: NORMAL
Lab: NORMAL

## 2021-02-10 ENCOUNTER — IMMUNIZATION (OUTPATIENT)
Dept: FAMILY MEDICINE | Facility: CLINIC | Age: 76
End: 2021-02-10
Payer: MEDICARE

## 2021-02-10 DIAGNOSIS — Z23 NEED FOR VACCINATION: Primary | ICD-10-CM

## 2021-02-10 PROCEDURE — 0002A COVID-19, MRNA, LNP-S, PF, 30 MCG/0.3 ML DOSE VACCINE: CPT | Mod: PBBFAC | Performed by: INTERNAL MEDICINE

## 2021-02-10 PROCEDURE — 91300 COVID-19, MRNA, LNP-S, PF, 30 MCG/0.3 ML DOSE VACCINE: CPT | Mod: PBBFAC | Performed by: INTERNAL MEDICINE

## 2021-03-02 ENCOUNTER — PATIENT MESSAGE (OUTPATIENT)
Dept: FAMILY MEDICINE | Facility: CLINIC | Age: 76
End: 2021-03-02

## 2021-03-05 ENCOUNTER — LAB VISIT (OUTPATIENT)
Dept: LAB | Facility: HOSPITAL | Age: 76
End: 2021-03-05
Attending: FAMILY MEDICINE
Payer: MEDICARE

## 2021-03-05 ENCOUNTER — OFFICE VISIT (OUTPATIENT)
Dept: FAMILY MEDICINE | Facility: CLINIC | Age: 76
End: 2021-03-05
Payer: MEDICARE

## 2021-03-05 VITALS
DIASTOLIC BLOOD PRESSURE: 80 MMHG | BODY MASS INDEX: 25.88 KG/M2 | SYSTOLIC BLOOD PRESSURE: 130 MMHG | WEIGHT: 180.75 LBS | HEIGHT: 70 IN | OXYGEN SATURATION: 97 % | HEART RATE: 58 BPM | TEMPERATURE: 98 F

## 2021-03-05 DIAGNOSIS — E78.5 TYPE 2 DIABETES MELLITUS WITH HYPERLIPIDEMIA: Primary | ICD-10-CM

## 2021-03-05 DIAGNOSIS — I10 ESSENTIAL HYPERTENSION: ICD-10-CM

## 2021-03-05 DIAGNOSIS — E11.69 TYPE 2 DIABETES MELLITUS WITH HYPERLIPIDEMIA: Primary | ICD-10-CM

## 2021-03-05 DIAGNOSIS — E78.5 TYPE 2 DIABETES MELLITUS WITH HYPERLIPIDEMIA: ICD-10-CM

## 2021-03-05 DIAGNOSIS — E11.69 TYPE 2 DIABETES MELLITUS WITH HYPERLIPIDEMIA: ICD-10-CM

## 2021-03-05 DIAGNOSIS — E78.5 HYPERLIPIDEMIA, UNSPECIFIED HYPERLIPIDEMIA TYPE: ICD-10-CM

## 2021-03-05 LAB
ALBUMIN SERPL BCP-MCNC: 4 G/DL (ref 3.5–5.2)
ALP SERPL-CCNC: 97 U/L (ref 55–135)
ALT SERPL W/O P-5'-P-CCNC: 20 U/L (ref 10–44)
ANION GAP SERPL CALC-SCNC: 7 MMOL/L (ref 8–16)
AST SERPL-CCNC: 18 U/L (ref 10–40)
BASOPHILS # BLD AUTO: 0.04 K/UL (ref 0–0.2)
BASOPHILS NFR BLD: 0.5 % (ref 0–1.9)
BILIRUB SERPL-MCNC: 0.4 MG/DL (ref 0.1–1)
BUN SERPL-MCNC: 16 MG/DL (ref 8–23)
CALCIUM SERPL-MCNC: 9.1 MG/DL (ref 8.7–10.5)
CHLORIDE SERPL-SCNC: 106 MMOL/L (ref 95–110)
CO2 SERPL-SCNC: 27 MMOL/L (ref 23–29)
CREAT SERPL-MCNC: 1 MG/DL (ref 0.5–1.4)
DIFFERENTIAL METHOD: ABNORMAL
EOSINOPHIL # BLD AUTO: 0.1 K/UL (ref 0–0.5)
EOSINOPHIL NFR BLD: 1.6 % (ref 0–8)
ERYTHROCYTE [DISTWIDTH] IN BLOOD BY AUTOMATED COUNT: 13.5 % (ref 11.5–14.5)
EST. GFR  (AFRICAN AMERICAN): >60 ML/MIN/1.73 M^2
EST. GFR  (NON AFRICAN AMERICAN): >60 ML/MIN/1.73 M^2
GLUCOSE SERPL-MCNC: 206 MG/DL (ref 70–110)
HCT VFR BLD AUTO: 43.1 % (ref 40–54)
HGB BLD-MCNC: 13.6 G/DL (ref 14–18)
IMM GRANULOCYTES # BLD AUTO: 0.04 K/UL (ref 0–0.04)
IMM GRANULOCYTES NFR BLD AUTO: 0.5 % (ref 0–0.5)
LYMPHOCYTES # BLD AUTO: 1.9 K/UL (ref 1–4.8)
LYMPHOCYTES NFR BLD: 24.3 % (ref 18–48)
MCH RBC QN AUTO: 29 PG (ref 27–31)
MCHC RBC AUTO-ENTMCNC: 31.6 G/DL (ref 32–36)
MCV RBC AUTO: 92 FL (ref 82–98)
MONOCYTES # BLD AUTO: 0.7 K/UL (ref 0.3–1)
MONOCYTES NFR BLD: 8.8 % (ref 4–15)
NEUTROPHILS # BLD AUTO: 4.9 K/UL (ref 1.8–7.7)
NEUTROPHILS NFR BLD: 64.3 % (ref 38–73)
NRBC BLD-RTO: 0 /100 WBC
PLATELET # BLD AUTO: 235 K/UL (ref 150–350)
PMV BLD AUTO: 10.6 FL (ref 9.2–12.9)
POTASSIUM SERPL-SCNC: 4.3 MMOL/L (ref 3.5–5.1)
PROT SERPL-MCNC: 7.2 G/DL (ref 6–8.4)
RBC # BLD AUTO: 4.69 M/UL (ref 4.6–6.2)
SODIUM SERPL-SCNC: 140 MMOL/L (ref 136–145)
TSH SERPL DL<=0.005 MIU/L-ACNC: 1.79 UIU/ML (ref 0.4–4)
WBC # BLD AUTO: 7.69 K/UL (ref 3.9–12.7)

## 2021-03-05 PROCEDURE — 3044F HG A1C LEVEL LT 7.0%: CPT | Mod: CPTII,S$GLB,, | Performed by: FAMILY MEDICINE

## 2021-03-05 PROCEDURE — 3079F DIAST BP 80-89 MM HG: CPT | Mod: CPTII,S$GLB,, | Performed by: FAMILY MEDICINE

## 2021-03-05 PROCEDURE — 99999 PR PBB SHADOW E&M-EST. PATIENT-LVL IV: CPT | Mod: PBBFAC,,, | Performed by: FAMILY MEDICINE

## 2021-03-05 PROCEDURE — 1126F AMNT PAIN NOTED NONE PRSNT: CPT | Mod: S$GLB,,, | Performed by: FAMILY MEDICINE

## 2021-03-05 PROCEDURE — 1101F PT FALLS ASSESS-DOCD LE1/YR: CPT | Mod: CPTII,S$GLB,, | Performed by: FAMILY MEDICINE

## 2021-03-05 PROCEDURE — 1101F PR PT FALLS ASSESS DOC 0-1 FALLS W/OUT INJ PAST YR: ICD-10-PCS | Mod: CPTII,S$GLB,, | Performed by: FAMILY MEDICINE

## 2021-03-05 PROCEDURE — 1159F MED LIST DOCD IN RCRD: CPT | Mod: S$GLB,,, | Performed by: FAMILY MEDICINE

## 2021-03-05 PROCEDURE — 99499 RISK ADDL DX/OHS AUDIT: ICD-10-PCS | Mod: S$GLB,,, | Performed by: FAMILY MEDICINE

## 2021-03-05 PROCEDURE — 99214 OFFICE O/P EST MOD 30 MIN: CPT | Mod: S$GLB,,, | Performed by: FAMILY MEDICINE

## 2021-03-05 PROCEDURE — 3008F BODY MASS INDEX DOCD: CPT | Mod: CPTII,S$GLB,, | Performed by: FAMILY MEDICINE

## 2021-03-05 PROCEDURE — 3288F PR FALLS RISK ASSESSMENT DOCUMENTED: ICD-10-PCS | Mod: CPTII,S$GLB,, | Performed by: FAMILY MEDICINE

## 2021-03-05 PROCEDURE — 3288F FALL RISK ASSESSMENT DOCD: CPT | Mod: CPTII,S$GLB,, | Performed by: FAMILY MEDICINE

## 2021-03-05 PROCEDURE — 84443 ASSAY THYROID STIM HORMONE: CPT | Performed by: FAMILY MEDICINE

## 2021-03-05 PROCEDURE — 3075F PR MOST RECENT SYSTOLIC BLOOD PRESS GE 130-139MM HG: ICD-10-PCS | Mod: CPTII,S$GLB,, | Performed by: FAMILY MEDICINE

## 2021-03-05 PROCEDURE — 3008F PR BODY MASS INDEX (BMI) DOCUMENTED: ICD-10-PCS | Mod: CPTII,S$GLB,, | Performed by: FAMILY MEDICINE

## 2021-03-05 PROCEDURE — 83036 HEMOGLOBIN GLYCOSYLATED A1C: CPT | Performed by: FAMILY MEDICINE

## 2021-03-05 PROCEDURE — 99214 PR OFFICE/OUTPT VISIT, EST, LEVL IV, 30-39 MIN: ICD-10-PCS | Mod: S$GLB,,, | Performed by: FAMILY MEDICINE

## 2021-03-05 PROCEDURE — 99999 PR PBB SHADOW E&M-EST. PATIENT-LVL IV: ICD-10-PCS | Mod: PBBFAC,,, | Performed by: FAMILY MEDICINE

## 2021-03-05 PROCEDURE — 1126F PR PAIN SEVERITY QUANTIFIED, NO PAIN PRESENT: ICD-10-PCS | Mod: S$GLB,,, | Performed by: FAMILY MEDICINE

## 2021-03-05 PROCEDURE — 85025 COMPLETE CBC W/AUTO DIFF WBC: CPT | Performed by: FAMILY MEDICINE

## 2021-03-05 PROCEDURE — 99499 UNLISTED E&M SERVICE: CPT | Mod: S$GLB,,, | Performed by: FAMILY MEDICINE

## 2021-03-05 PROCEDURE — 80053 COMPREHEN METABOLIC PANEL: CPT | Performed by: FAMILY MEDICINE

## 2021-03-05 PROCEDURE — 1159F PR MEDICATION LIST DOCUMENTED IN MEDICAL RECORD: ICD-10-PCS | Mod: S$GLB,,, | Performed by: FAMILY MEDICINE

## 2021-03-05 PROCEDURE — 3079F PR MOST RECENT DIASTOLIC BLOOD PRESSURE 80-89 MM HG: ICD-10-PCS | Mod: CPTII,S$GLB,, | Performed by: FAMILY MEDICINE

## 2021-03-05 PROCEDURE — 36415 COLL VENOUS BLD VENIPUNCTURE: CPT | Mod: PO | Performed by: FAMILY MEDICINE

## 2021-03-05 PROCEDURE — 3044F PR MOST RECENT HEMOGLOBIN A1C LEVEL <7.0%: ICD-10-PCS | Mod: CPTII,S$GLB,, | Performed by: FAMILY MEDICINE

## 2021-03-05 PROCEDURE — 3075F SYST BP GE 130 - 139MM HG: CPT | Mod: CPTII,S$GLB,, | Performed by: FAMILY MEDICINE

## 2021-03-06 LAB
ESTIMATED AVG GLUCOSE: 166 MG/DL (ref 68–131)
HBA1C MFR BLD: 7.4 % (ref 4–5.6)

## 2021-03-10 ENCOUNTER — TELEPHONE (OUTPATIENT)
Dept: ADMINISTRATIVE | Facility: OTHER | Age: 76
End: 2021-03-10

## 2021-03-10 DIAGNOSIS — E11.69 TYPE 2 DIABETES MELLITUS WITH HYPERLIPIDEMIA: ICD-10-CM

## 2021-03-10 DIAGNOSIS — E78.5 TYPE 2 DIABETES MELLITUS WITH HYPERLIPIDEMIA: ICD-10-CM

## 2021-03-17 ENCOUNTER — PATIENT OUTREACH (OUTPATIENT)
Dept: ADMINISTRATIVE | Facility: HOSPITAL | Age: 76
End: 2021-03-17

## 2021-03-26 ENCOUNTER — PATIENT OUTREACH (OUTPATIENT)
Dept: ADMINISTRATIVE | Facility: HOSPITAL | Age: 76
End: 2021-03-26

## 2021-04-06 ENCOUNTER — PATIENT MESSAGE (OUTPATIENT)
Dept: ADMINISTRATIVE | Facility: HOSPITAL | Age: 76
End: 2021-04-06

## 2021-05-12 LAB
LEFT EYE DM RETINOPATHY: NEGATIVE
RIGHT EYE DM RETINOPATHY: NEGATIVE

## 2021-06-21 ENCOUNTER — PATIENT OUTREACH (OUTPATIENT)
Dept: ADMINISTRATIVE | Facility: HOSPITAL | Age: 76
End: 2021-06-21

## 2021-07-05 DIAGNOSIS — E78.5 TYPE 2 DIABETES MELLITUS WITH HYPERLIPIDEMIA: ICD-10-CM

## 2021-07-05 DIAGNOSIS — K21.9 GASTROESOPHAGEAL REFLUX DISEASE: ICD-10-CM

## 2021-07-05 DIAGNOSIS — E11.69 TYPE 2 DIABETES MELLITUS WITH HYPERLIPIDEMIA: ICD-10-CM

## 2021-07-05 DIAGNOSIS — E11.59 HYPERTENSION ASSOCIATED WITH DIABETES: ICD-10-CM

## 2021-07-05 DIAGNOSIS — I15.2 HYPERTENSION ASSOCIATED WITH DIABETES: ICD-10-CM

## 2021-07-05 DIAGNOSIS — I10 ESSENTIAL HYPERTENSION: ICD-10-CM

## 2021-07-07 ENCOUNTER — PATIENT MESSAGE (OUTPATIENT)
Dept: ADMINISTRATIVE | Facility: HOSPITAL | Age: 76
End: 2021-07-07

## 2021-07-07 RX ORDER — OMEPRAZOLE 40 MG/1
CAPSULE, DELAYED RELEASE ORAL
Qty: 90 CAPSULE | Refills: 2 | Status: SHIPPED | OUTPATIENT
Start: 2021-07-07 | End: 2022-03-29

## 2021-07-07 RX ORDER — LOSARTAN POTASSIUM 100 MG/1
TABLET ORAL
Qty: 90 TABLET | Refills: 2 | Status: SHIPPED | OUTPATIENT
Start: 2021-07-07 | End: 2022-03-29

## 2021-07-07 RX ORDER — ATORVASTATIN CALCIUM 40 MG/1
40 TABLET, FILM COATED ORAL DAILY
Qty: 90 TABLET | Refills: 0 | Status: SHIPPED | OUTPATIENT
Start: 2021-07-07 | End: 2021-10-08

## 2021-07-07 RX ORDER — GLIPIZIDE 10 MG/1
TABLET ORAL
Qty: 90 TABLET | Refills: 0 | Status: SHIPPED | OUTPATIENT
Start: 2021-07-07 | End: 2021-10-08

## 2021-07-08 ENCOUNTER — PATIENT OUTREACH (OUTPATIENT)
Dept: ADMINISTRATIVE | Facility: HOSPITAL | Age: 76
End: 2021-07-08

## 2021-07-08 DIAGNOSIS — E11.9 DIABETES MELLITUS WITHOUT COMPLICATION: Primary | ICD-10-CM

## 2021-07-12 DIAGNOSIS — E78.5 TYPE 2 DIABETES MELLITUS WITH HYPERLIPIDEMIA: ICD-10-CM

## 2021-07-12 DIAGNOSIS — E11.69 TYPE 2 DIABETES MELLITUS WITH HYPERLIPIDEMIA: ICD-10-CM

## 2021-07-12 RX ORDER — ISOPROPYL ALCOHOL 70 ML/100ML
1 SWAB TOPICAL
COMMUNITY
End: 2021-07-12 | Stop reason: SDUPTHER

## 2021-07-12 RX ORDER — BLOOD-GLUCOSE METER
EACH MISCELLANEOUS
COMMUNITY
End: 2021-07-12 | Stop reason: SDUPTHER

## 2021-07-13 RX ORDER — PEN NEEDLE, DIABETIC 30 GX3/16"
NEEDLE, DISPOSABLE MISCELLANEOUS
Qty: 100 EACH | Refills: 3 | Status: SHIPPED | OUTPATIENT
Start: 2021-07-13 | End: 2021-07-19 | Stop reason: SDUPTHER

## 2021-07-13 RX ORDER — ISOPROPYL ALCOHOL 70 ML/100ML
1 SWAB TOPICAL
Qty: 100 EACH | Refills: 11 | Status: SHIPPED | OUTPATIENT
Start: 2021-07-13 | End: 2021-07-20 | Stop reason: SDUPTHER

## 2021-07-13 RX ORDER — BLOOD-GLUCOSE METER
EACH MISCELLANEOUS
Qty: 1 EACH | Refills: 11 | Status: SHIPPED | OUTPATIENT
Start: 2021-07-13 | End: 2021-07-20 | Stop reason: SDUPTHER

## 2021-07-16 DIAGNOSIS — E78.5 TYPE 2 DIABETES MELLITUS WITH HYPERLIPIDEMIA: ICD-10-CM

## 2021-07-16 DIAGNOSIS — E11.69 TYPE 2 DIABETES MELLITUS WITH HYPERLIPIDEMIA: ICD-10-CM

## 2021-07-16 RX ORDER — DEXTROSE 4 G
TABLET,CHEWABLE ORAL
Refills: 0 | Status: CANCELLED | OUTPATIENT
Start: 2021-07-16

## 2021-07-16 RX ORDER — INSULIN GLARGINE 100 [IU]/ML
INJECTION, SOLUTION SUBCUTANEOUS
Qty: 30 ML | Refills: 1 | Status: CANCELLED | OUTPATIENT
Start: 2021-07-16

## 2021-07-19 ENCOUNTER — PATIENT MESSAGE (OUTPATIENT)
Dept: ADMINISTRATIVE | Facility: HOSPITAL | Age: 76
End: 2021-07-19

## 2021-07-19 DIAGNOSIS — E11.69 TYPE 2 DIABETES MELLITUS WITH HYPERLIPIDEMIA: ICD-10-CM

## 2021-07-19 DIAGNOSIS — E78.5 TYPE 2 DIABETES MELLITUS WITH HYPERLIPIDEMIA: ICD-10-CM

## 2021-07-19 RX ORDER — INSULIN GLARGINE 100 [IU]/ML
INJECTION, SOLUTION SUBCUTANEOUS
Qty: 30 ML | Refills: 1 | Status: SHIPPED | OUTPATIENT
Start: 2021-07-19 | End: 2021-07-20 | Stop reason: SDUPTHER

## 2021-07-19 RX ORDER — DEXTROSE 4 G
TABLET,CHEWABLE ORAL
Qty: 1 EACH | Refills: 0 | Status: SHIPPED | OUTPATIENT
Start: 2021-07-19 | End: 2021-07-20 | Stop reason: SDUPTHER

## 2021-07-19 RX ORDER — PEN NEEDLE, DIABETIC 30 GX3/16"
NEEDLE, DISPOSABLE MISCELLANEOUS
Qty: 100 EACH | Refills: 3 | Status: SHIPPED | OUTPATIENT
Start: 2021-07-19 | End: 2021-07-20 | Stop reason: SDUPTHER

## 2021-07-20 DIAGNOSIS — E78.5 TYPE 2 DIABETES MELLITUS WITH HYPERLIPIDEMIA: ICD-10-CM

## 2021-07-20 DIAGNOSIS — E11.69 TYPE 2 DIABETES MELLITUS WITH HYPERLIPIDEMIA: ICD-10-CM

## 2021-07-20 RX ORDER — INSULIN GLARGINE 100 [IU]/ML
INJECTION, SOLUTION SUBCUTANEOUS
Qty: 30 ML | Refills: 1 | Status: SHIPPED | OUTPATIENT
Start: 2021-07-20 | End: 2021-10-05

## 2021-07-20 RX ORDER — ISOPROPYL ALCOHOL 70 ML/100ML
1 SWAB TOPICAL
Qty: 200 EACH | Refills: 3 | Status: SHIPPED | OUTPATIENT
Start: 2021-07-20

## 2021-07-20 RX ORDER — LANCETS
EACH MISCELLANEOUS
Qty: 100 EACH | Refills: 3 | Status: SHIPPED | OUTPATIENT
Start: 2021-07-20

## 2021-07-20 RX ORDER — PEN NEEDLE, DIABETIC 30 GX3/16"
NEEDLE, DISPOSABLE MISCELLANEOUS
Qty: 100 EACH | Refills: 3 | Status: SHIPPED | OUTPATIENT
Start: 2021-07-20 | End: 2023-03-07 | Stop reason: SDUPTHER

## 2021-07-20 RX ORDER — DEXTROSE 4 G
TABLET,CHEWABLE ORAL
Qty: 1 EACH | Refills: 0 | Status: SHIPPED | OUTPATIENT
Start: 2021-07-20

## 2021-07-20 RX ORDER — BLOOD-GLUCOSE METER
EACH MISCELLANEOUS
Qty: 1 EACH | Refills: 0 | Status: SHIPPED | OUTPATIENT
Start: 2021-07-20 | End: 2022-03-29

## 2021-08-05 ENCOUNTER — PATIENT MESSAGE (OUTPATIENT)
Dept: DERMATOLOGY | Facility: CLINIC | Age: 76
End: 2021-08-05

## 2021-09-09 ENCOUNTER — LAB VISIT (OUTPATIENT)
Dept: LAB | Facility: HOSPITAL | Age: 76
End: 2021-09-09
Attending: FAMILY MEDICINE
Payer: MEDICARE

## 2021-09-09 ENCOUNTER — OFFICE VISIT (OUTPATIENT)
Dept: FAMILY MEDICINE | Facility: CLINIC | Age: 76
End: 2021-09-09
Payer: MEDICARE

## 2021-09-09 VITALS
OXYGEN SATURATION: 98 % | BODY MASS INDEX: 25.88 KG/M2 | DIASTOLIC BLOOD PRESSURE: 78 MMHG | WEIGHT: 180.75 LBS | SYSTOLIC BLOOD PRESSURE: 130 MMHG | TEMPERATURE: 98 F | HEART RATE: 60 BPM | HEIGHT: 70 IN

## 2021-09-09 DIAGNOSIS — I10 ESSENTIAL HYPERTENSION: ICD-10-CM

## 2021-09-09 DIAGNOSIS — E78.5 HYPERLIPIDEMIA, UNSPECIFIED HYPERLIPIDEMIA TYPE: ICD-10-CM

## 2021-09-09 DIAGNOSIS — Z00.00 WELLNESS EXAMINATION: Primary | ICD-10-CM

## 2021-09-09 DIAGNOSIS — Z12.5 SCREENING PSA (PROSTATE SPECIFIC ANTIGEN): ICD-10-CM

## 2021-09-09 DIAGNOSIS — E11.69 TYPE 2 DIABETES MELLITUS WITH HYPERLIPIDEMIA: ICD-10-CM

## 2021-09-09 DIAGNOSIS — E78.5 TYPE 2 DIABETES MELLITUS WITH HYPERLIPIDEMIA: ICD-10-CM

## 2021-09-09 LAB
ALBUMIN SERPL BCP-MCNC: 3.9 G/DL (ref 3.5–5.2)
ALP SERPL-CCNC: 99 U/L (ref 55–135)
ALT SERPL W/O P-5'-P-CCNC: 21 U/L (ref 10–44)
ANION GAP SERPL CALC-SCNC: 9 MMOL/L (ref 8–16)
AST SERPL-CCNC: 22 U/L (ref 10–40)
BASOPHILS # BLD AUTO: 0.04 K/UL (ref 0–0.2)
BASOPHILS NFR BLD: 0.5 % (ref 0–1.9)
BILIRUB SERPL-MCNC: 0.4 MG/DL (ref 0.1–1)
BUN SERPL-MCNC: 11 MG/DL (ref 8–23)
CALCIUM SERPL-MCNC: 9.4 MG/DL (ref 8.7–10.5)
CHLORIDE SERPL-SCNC: 105 MMOL/L (ref 95–110)
CHOLEST SERPL-MCNC: 215 MG/DL (ref 120–199)
CHOLEST/HDLC SERPL: 4.8 {RATIO} (ref 2–5)
CO2 SERPL-SCNC: 27 MMOL/L (ref 23–29)
COMPLEXED PSA SERPL-MCNC: 4.1 NG/ML (ref 0–4)
CREAT SERPL-MCNC: 0.9 MG/DL (ref 0.5–1.4)
DIFFERENTIAL METHOD: ABNORMAL
EOSINOPHIL # BLD AUTO: 0.2 K/UL (ref 0–0.5)
EOSINOPHIL NFR BLD: 2.1 % (ref 0–8)
ERYTHROCYTE [DISTWIDTH] IN BLOOD BY AUTOMATED COUNT: 13.8 % (ref 11.5–14.5)
EST. GFR  (AFRICAN AMERICAN): >60 ML/MIN/1.73 M^2
EST. GFR  (NON AFRICAN AMERICAN): >60 ML/MIN/1.73 M^2
ESTIMATED AVG GLUCOSE: 169 MG/DL (ref 68–131)
GLUCOSE SERPL-MCNC: 134 MG/DL (ref 70–110)
HBA1C MFR BLD: 7.5 % (ref 4–5.6)
HCT VFR BLD AUTO: 42.5 % (ref 40–54)
HDLC SERPL-MCNC: 45 MG/DL (ref 40–75)
HDLC SERPL: 20.9 % (ref 20–50)
HGB BLD-MCNC: 13.8 G/DL (ref 14–18)
IMM GRANULOCYTES # BLD AUTO: 0.06 K/UL (ref 0–0.04)
IMM GRANULOCYTES NFR BLD AUTO: 0.7 % (ref 0–0.5)
LDLC SERPL CALC-MCNC: 147.8 MG/DL (ref 63–159)
LYMPHOCYTES # BLD AUTO: 2 K/UL (ref 1–4.8)
LYMPHOCYTES NFR BLD: 22.9 % (ref 18–48)
MCH RBC QN AUTO: 29.6 PG (ref 27–31)
MCHC RBC AUTO-ENTMCNC: 32.5 G/DL (ref 32–36)
MCV RBC AUTO: 91 FL (ref 82–98)
MONOCYTES # BLD AUTO: 0.6 K/UL (ref 0.3–1)
MONOCYTES NFR BLD: 6.8 % (ref 4–15)
NEUTROPHILS # BLD AUTO: 5.8 K/UL (ref 1.8–7.7)
NEUTROPHILS NFR BLD: 67 % (ref 38–73)
NONHDLC SERPL-MCNC: 170 MG/DL
NRBC BLD-RTO: 0 /100 WBC
PLATELET # BLD AUTO: 259 K/UL (ref 150–450)
PMV BLD AUTO: 10.2 FL (ref 9.2–12.9)
POTASSIUM SERPL-SCNC: 4.9 MMOL/L (ref 3.5–5.1)
PROT SERPL-MCNC: 7 G/DL (ref 6–8.4)
RBC # BLD AUTO: 4.66 M/UL (ref 4.6–6.2)
SODIUM SERPL-SCNC: 141 MMOL/L (ref 136–145)
TRIGL SERPL-MCNC: 111 MG/DL (ref 30–150)
TSH SERPL DL<=0.005 MIU/L-ACNC: 2.08 UIU/ML (ref 0.4–4)
WBC # BLD AUTO: 8.7 K/UL (ref 3.9–12.7)

## 2021-09-09 PROCEDURE — 99999 PR PBB SHADOW E&M-EST. PATIENT-LVL IV: CPT | Mod: PBBFAC,,, | Performed by: FAMILY MEDICINE

## 2021-09-09 PROCEDURE — 3078F PR MOST RECENT DIASTOLIC BLOOD PRESSURE < 80 MM HG: ICD-10-PCS | Mod: CPTII,S$GLB,, | Performed by: FAMILY MEDICINE

## 2021-09-09 PROCEDURE — 1160F PR REVIEW ALL MEDS BY PRESCRIBER/CLIN PHARMACIST DOCUMENTED: ICD-10-PCS | Mod: CPTII,S$GLB,, | Performed by: FAMILY MEDICINE

## 2021-09-09 PROCEDURE — 1126F AMNT PAIN NOTED NONE PRSNT: CPT | Mod: CPTII,S$GLB,, | Performed by: FAMILY MEDICINE

## 2021-09-09 PROCEDURE — 1159F MED LIST DOCD IN RCRD: CPT | Mod: CPTII,S$GLB,, | Performed by: FAMILY MEDICINE

## 2021-09-09 PROCEDURE — 80061 LIPID PANEL: CPT | Performed by: FAMILY MEDICINE

## 2021-09-09 PROCEDURE — 1126F PR PAIN SEVERITY QUANTIFIED, NO PAIN PRESENT: ICD-10-PCS | Mod: CPTII,S$GLB,, | Performed by: FAMILY MEDICINE

## 2021-09-09 PROCEDURE — 80053 COMPREHEN METABOLIC PANEL: CPT | Performed by: FAMILY MEDICINE

## 2021-09-09 PROCEDURE — 99999 PR PBB SHADOW E&M-EST. PATIENT-LVL IV: ICD-10-PCS | Mod: PBBFAC,,, | Performed by: FAMILY MEDICINE

## 2021-09-09 PROCEDURE — 3008F PR BODY MASS INDEX (BMI) DOCUMENTED: ICD-10-PCS | Mod: CPTII,S$GLB,, | Performed by: FAMILY MEDICINE

## 2021-09-09 PROCEDURE — 84153 ASSAY OF PSA TOTAL: CPT | Performed by: FAMILY MEDICINE

## 2021-09-09 PROCEDURE — 3288F FALL RISK ASSESSMENT DOCD: CPT | Mod: CPTII,S$GLB,, | Performed by: FAMILY MEDICINE

## 2021-09-09 PROCEDURE — 4010F PR ACE/ARB THEARPY RXD/TAKEN: ICD-10-PCS | Mod: CPTII,S$GLB,, | Performed by: FAMILY MEDICINE

## 2021-09-09 PROCEDURE — 99499 UNLISTED E&M SERVICE: CPT | Mod: HCNC,S$GLB,, | Performed by: FAMILY MEDICINE

## 2021-09-09 PROCEDURE — 3075F SYST BP GE 130 - 139MM HG: CPT | Mod: CPTII,S$GLB,, | Performed by: FAMILY MEDICINE

## 2021-09-09 PROCEDURE — 84443 ASSAY THYROID STIM HORMONE: CPT | Performed by: FAMILY MEDICINE

## 2021-09-09 PROCEDURE — 3288F PR FALLS RISK ASSESSMENT DOCUMENTED: ICD-10-PCS | Mod: CPTII,S$GLB,, | Performed by: FAMILY MEDICINE

## 2021-09-09 PROCEDURE — 3051F HG A1C>EQUAL 7.0%<8.0%: CPT | Mod: CPTII,S$GLB,, | Performed by: FAMILY MEDICINE

## 2021-09-09 PROCEDURE — 4010F ACE/ARB THERAPY RXD/TAKEN: CPT | Mod: CPTII,S$GLB,, | Performed by: FAMILY MEDICINE

## 2021-09-09 PROCEDURE — 1101F PT FALLS ASSESS-DOCD LE1/YR: CPT | Mod: CPTII,S$GLB,, | Performed by: FAMILY MEDICINE

## 2021-09-09 PROCEDURE — 99397 PER PM REEVAL EST PAT 65+ YR: CPT | Mod: S$GLB,,, | Performed by: FAMILY MEDICINE

## 2021-09-09 PROCEDURE — 1160F RVW MEDS BY RX/DR IN RCRD: CPT | Mod: CPTII,S$GLB,, | Performed by: FAMILY MEDICINE

## 2021-09-09 PROCEDURE — 3008F BODY MASS INDEX DOCD: CPT | Mod: CPTII,S$GLB,, | Performed by: FAMILY MEDICINE

## 2021-09-09 PROCEDURE — 1159F PR MEDICATION LIST DOCUMENTED IN MEDICAL RECORD: ICD-10-PCS | Mod: CPTII,S$GLB,, | Performed by: FAMILY MEDICINE

## 2021-09-09 PROCEDURE — 1101F PR PT FALLS ASSESS DOC 0-1 FALLS W/OUT INJ PAST YR: ICD-10-PCS | Mod: CPTII,S$GLB,, | Performed by: FAMILY MEDICINE

## 2021-09-09 PROCEDURE — 83036 HEMOGLOBIN GLYCOSYLATED A1C: CPT | Performed by: FAMILY MEDICINE

## 2021-09-09 PROCEDURE — 3078F DIAST BP <80 MM HG: CPT | Mod: CPTII,S$GLB,, | Performed by: FAMILY MEDICINE

## 2021-09-09 PROCEDURE — 99397 PR PREVENTIVE VISIT,EST,65 & OVER: ICD-10-PCS | Mod: S$GLB,,, | Performed by: FAMILY MEDICINE

## 2021-09-09 PROCEDURE — 3075F PR MOST RECENT SYSTOLIC BLOOD PRESS GE 130-139MM HG: ICD-10-PCS | Mod: CPTII,S$GLB,, | Performed by: FAMILY MEDICINE

## 2021-09-09 PROCEDURE — 3051F PR MOST RECENT HEMOGLOBIN A1C LEVEL 7.0 - < 8.0%: ICD-10-PCS | Mod: CPTII,S$GLB,, | Performed by: FAMILY MEDICINE

## 2021-09-09 PROCEDURE — 99499 RISK ADDL DX/OHS AUDIT: ICD-10-PCS | Mod: HCNC,S$GLB,, | Performed by: FAMILY MEDICINE

## 2021-09-09 PROCEDURE — 85025 COMPLETE CBC W/AUTO DIFF WBC: CPT | Performed by: FAMILY MEDICINE

## 2021-09-09 PROCEDURE — 36415 COLL VENOUS BLD VENIPUNCTURE: CPT | Mod: PO | Performed by: FAMILY MEDICINE

## 2021-09-09 RX ORDER — MONTELUKAST SODIUM 10 MG/1
10 TABLET ORAL NIGHTLY
Qty: 90 TABLET | Refills: 1 | Status: SHIPPED | OUTPATIENT
Start: 2021-09-09 | End: 2021-10-09

## 2021-09-09 RX ORDER — LANCETS 33 GAUGE
EACH MISCELLANEOUS
COMMUNITY
Start: 2021-07-21

## 2021-10-04 DIAGNOSIS — E78.5 TYPE 2 DIABETES MELLITUS WITH HYPERLIPIDEMIA: ICD-10-CM

## 2021-10-04 DIAGNOSIS — E11.69 TYPE 2 DIABETES MELLITUS WITH HYPERLIPIDEMIA: ICD-10-CM

## 2021-10-05 ENCOUNTER — OFFICE VISIT (OUTPATIENT)
Dept: DERMATOLOGY | Facility: CLINIC | Age: 76
End: 2021-10-05
Payer: MEDICARE

## 2021-10-05 VITALS — BODY MASS INDEX: 25.77 KG/M2 | WEIGHT: 180 LBS | HEIGHT: 70 IN

## 2021-10-05 DIAGNOSIS — Z85.828 PERSONAL HISTORY OF MALIGNANT NEOPLASM OF SKIN: ICD-10-CM

## 2021-10-05 DIAGNOSIS — L82.1 SEBORRHEIC KERATOSES: ICD-10-CM

## 2021-10-05 DIAGNOSIS — D18.01 CHERRY ANGIOMA: ICD-10-CM

## 2021-10-05 DIAGNOSIS — D22.9 MULTIPLE BENIGN NEVI: Primary | ICD-10-CM

## 2021-10-05 DIAGNOSIS — D48.5 NEOPLASM OF UNCERTAIN BEHAVIOR OF SKIN: ICD-10-CM

## 2021-10-05 PROCEDURE — 3288F FALL RISK ASSESSMENT DOCD: CPT | Mod: HCNC,CPTII,S$GLB, | Performed by: DERMATOLOGY

## 2021-10-05 PROCEDURE — 11102 TANGNTL BX SKIN SINGLE LES: CPT | Mod: HCNC,S$GLB,, | Performed by: DERMATOLOGY

## 2021-10-05 PROCEDURE — 1126F AMNT PAIN NOTED NONE PRSNT: CPT | Mod: HCNC,CPTII,S$GLB, | Performed by: DERMATOLOGY

## 2021-10-05 PROCEDURE — 88305 TISSUE EXAM BY PATHOLOGIST: CPT | Mod: HCNC | Performed by: PATHOLOGY

## 2021-10-05 PROCEDURE — 1101F PR PT FALLS ASSESS DOC 0-1 FALLS W/OUT INJ PAST YR: ICD-10-PCS | Mod: HCNC,CPTII,S$GLB, | Performed by: DERMATOLOGY

## 2021-10-05 PROCEDURE — 11103 PR TANGENTIAL BIOPSY, SKIN, EA ADDTL LESION: ICD-10-PCS | Mod: 59,HCNC,S$GLB, | Performed by: DERMATOLOGY

## 2021-10-05 PROCEDURE — 3051F PR MOST RECENT HEMOGLOBIN A1C LEVEL 7.0 - < 8.0%: ICD-10-PCS | Mod: HCNC,CPTII,S$GLB, | Performed by: DERMATOLOGY

## 2021-10-05 PROCEDURE — 88305 TISSUE EXAM BY PATHOLOGIST: CPT | Mod: 26,HCNC,, | Performed by: PATHOLOGY

## 2021-10-05 PROCEDURE — 99999 PR PBB SHADOW E&M-EST. PATIENT-LVL III: CPT | Mod: PBBFAC,HCNC,, | Performed by: DERMATOLOGY

## 2021-10-05 PROCEDURE — 88305 TISSUE EXAM BY PATHOLOGIST: ICD-10-PCS | Mod: 26,HCNC,, | Performed by: PATHOLOGY

## 2021-10-05 PROCEDURE — 11103 TANGNTL BX SKIN EA SEP/ADDL: CPT | Mod: 59,HCNC,S$GLB, | Performed by: DERMATOLOGY

## 2021-10-05 PROCEDURE — 4010F PR ACE/ARB THEARPY RXD/TAKEN: ICD-10-PCS | Mod: HCNC,CPTII,S$GLB, | Performed by: DERMATOLOGY

## 2021-10-05 PROCEDURE — 3008F PR BODY MASS INDEX (BMI) DOCUMENTED: ICD-10-PCS | Mod: HCNC,CPTII,S$GLB, | Performed by: DERMATOLOGY

## 2021-10-05 PROCEDURE — 88305 TISSUE EXAM BY PATHOLOGIST: CPT | Mod: 59,HCNC | Performed by: PATHOLOGY

## 2021-10-05 PROCEDURE — 1126F PR PAIN SEVERITY QUANTIFIED, NO PAIN PRESENT: ICD-10-PCS | Mod: HCNC,CPTII,S$GLB, | Performed by: DERMATOLOGY

## 2021-10-05 PROCEDURE — 1159F PR MEDICATION LIST DOCUMENTED IN MEDICAL RECORD: ICD-10-PCS | Mod: HCNC,CPTII,S$GLB, | Performed by: DERMATOLOGY

## 2021-10-05 PROCEDURE — 99999 PR PBB SHADOW E&M-EST. PATIENT-LVL III: ICD-10-PCS | Mod: PBBFAC,HCNC,, | Performed by: DERMATOLOGY

## 2021-10-05 PROCEDURE — 3008F BODY MASS INDEX DOCD: CPT | Mod: HCNC,CPTII,S$GLB, | Performed by: DERMATOLOGY

## 2021-10-05 PROCEDURE — 1101F PT FALLS ASSESS-DOCD LE1/YR: CPT | Mod: HCNC,CPTII,S$GLB, | Performed by: DERMATOLOGY

## 2021-10-05 PROCEDURE — 3051F HG A1C>EQUAL 7.0%<8.0%: CPT | Mod: HCNC,CPTII,S$GLB, | Performed by: DERMATOLOGY

## 2021-10-05 PROCEDURE — 4010F ACE/ARB THERAPY RXD/TAKEN: CPT | Mod: HCNC,CPTII,S$GLB, | Performed by: DERMATOLOGY

## 2021-10-05 PROCEDURE — 1159F MED LIST DOCD IN RCRD: CPT | Mod: HCNC,CPTII,S$GLB, | Performed by: DERMATOLOGY

## 2021-10-05 PROCEDURE — 99213 PR OFFICE/OUTPT VISIT, EST, LEVL III, 20-29 MIN: ICD-10-PCS | Mod: 25,HCNC,S$GLB, | Performed by: DERMATOLOGY

## 2021-10-05 PROCEDURE — 11102 PR TANGENTIAL BIOPSY, SKIN, SINGLE LESION: ICD-10-PCS | Mod: HCNC,S$GLB,, | Performed by: DERMATOLOGY

## 2021-10-05 PROCEDURE — 99213 OFFICE O/P EST LOW 20 MIN: CPT | Mod: 25,HCNC,S$GLB, | Performed by: DERMATOLOGY

## 2021-10-05 PROCEDURE — 3288F PR FALLS RISK ASSESSMENT DOCUMENTED: ICD-10-PCS | Mod: HCNC,CPTII,S$GLB, | Performed by: DERMATOLOGY

## 2021-10-05 RX ORDER — INSULIN GLARGINE 100 [IU]/ML
INJECTION, SOLUTION SUBCUTANEOUS
Qty: 30 ML | Refills: 1 | Status: SHIPPED | OUTPATIENT
Start: 2021-10-05 | End: 2022-09-12 | Stop reason: SDUPTHER

## 2021-10-07 DIAGNOSIS — E78.5 TYPE 2 DIABETES MELLITUS WITH HYPERLIPIDEMIA: ICD-10-CM

## 2021-10-07 DIAGNOSIS — I15.2 HYPERTENSION ASSOCIATED WITH DIABETES: ICD-10-CM

## 2021-10-07 DIAGNOSIS — E11.59 HYPERTENSION ASSOCIATED WITH DIABETES: ICD-10-CM

## 2021-10-07 DIAGNOSIS — E11.69 TYPE 2 DIABETES MELLITUS WITH HYPERLIPIDEMIA: ICD-10-CM

## 2021-10-08 RX ORDER — ATORVASTATIN CALCIUM 40 MG/1
TABLET, FILM COATED ORAL
Qty: 90 TABLET | Refills: 3 | Status: SHIPPED | OUTPATIENT
Start: 2021-10-08 | End: 2022-09-12 | Stop reason: SDUPTHER

## 2021-10-08 RX ORDER — GLIPIZIDE 10 MG/1
TABLET ORAL
Qty: 90 TABLET | Refills: 1 | Status: SHIPPED | OUTPATIENT
Start: 2021-10-08 | End: 2022-03-29

## 2021-10-11 LAB
FINAL PATHOLOGIC DIAGNOSIS: NORMAL
FINAL PATHOLOGIC DIAGNOSIS: NORMAL
GROSS: NORMAL
GROSS: NORMAL
Lab: NORMAL
Lab: NORMAL
MICROSCOPIC EXAM: NORMAL
MICROSCOPIC EXAM: NORMAL

## 2021-11-11 ENCOUNTER — TELEPHONE (OUTPATIENT)
Dept: ADMINISTRATIVE | Facility: OTHER | Age: 76
End: 2021-11-11
Payer: MEDICARE

## 2021-11-29 ENCOUNTER — TELEPHONE (OUTPATIENT)
Dept: ADMINISTRATIVE | Facility: HOSPITAL | Age: 76
End: 2021-11-29
Payer: MEDICARE

## 2021-12-21 ENCOUNTER — IMMUNIZATION (OUTPATIENT)
Dept: FAMILY MEDICINE | Facility: CLINIC | Age: 76
End: 2021-12-21
Payer: MEDICARE

## 2021-12-21 DIAGNOSIS — Z23 NEED FOR VACCINATION: Primary | ICD-10-CM

## 2021-12-21 PROCEDURE — 0004A COVID-19, MRNA, LNP-S, PF, 30 MCG/0.3 ML DOSE VACCINE: CPT | Mod: HCNC,PBBFAC | Performed by: FAMILY MEDICINE

## 2022-02-09 ENCOUNTER — PATIENT OUTREACH (OUTPATIENT)
Dept: ADMINISTRATIVE | Facility: OTHER | Age: 77
End: 2022-02-09
Payer: MEDICARE

## 2022-02-10 NOTE — PROGRESS NOTES
Health Maintenance Due   Topic Date Due    Shingles Vaccine (1 of 2) Never done    Foot Exam  02/07/2020    Diabetes Urine Screening  06/02/2021    Influenza Vaccine (1) 09/01/2021     Updates were requested from care everywhere.  Chart was reviewed for overdue Proactive [facility] Encounters ([facility]AND[facility]) topics (CRS, Breast Cancer Screening, Eye exam)  Health Maintenance has been updated.  LINKS immunization registry triggered.  Immunizations were reconciled.

## 2022-02-11 ENCOUNTER — OFFICE VISIT (OUTPATIENT)
Dept: GASTROENTEROLOGY | Facility: CLINIC | Age: 77
End: 2022-02-11
Payer: MEDICARE

## 2022-02-11 ENCOUNTER — TELEPHONE (OUTPATIENT)
Dept: GASTROENTEROLOGY | Facility: CLINIC | Age: 77
End: 2022-02-11

## 2022-02-11 ENCOUNTER — LAB VISIT (OUTPATIENT)
Dept: LAB | Facility: HOSPITAL | Age: 77
End: 2022-02-11
Attending: NURSE PRACTITIONER
Payer: MEDICARE

## 2022-02-11 VITALS — WEIGHT: 177 LBS | HEIGHT: 70 IN | BODY MASS INDEX: 25.34 KG/M2 | RESPIRATION RATE: 18 BRPM

## 2022-02-11 DIAGNOSIS — Z87.19 HISTORY OF GASTROESOPHAGEAL REFLUX (GERD): ICD-10-CM

## 2022-02-11 DIAGNOSIS — Z86.010 HISTORY OF COLON POLYPS: ICD-10-CM

## 2022-02-11 DIAGNOSIS — K92.1 BLOOD IN STOOL: ICD-10-CM

## 2022-02-11 DIAGNOSIS — R19.7 DIARRHEA, UNSPECIFIED TYPE: ICD-10-CM

## 2022-02-11 DIAGNOSIS — Z01.818 PRE-OP TESTING: Primary | ICD-10-CM

## 2022-02-11 DIAGNOSIS — Z87.19 HISTORY OF ULCERATIVE COLITIS: ICD-10-CM

## 2022-02-11 DIAGNOSIS — K92.1 BLOOD IN STOOL: Primary | ICD-10-CM

## 2022-02-11 LAB
ALBUMIN SERPL BCP-MCNC: 4 G/DL (ref 3.5–5.2)
ALP SERPL-CCNC: 117 U/L (ref 55–135)
ALT SERPL W/O P-5'-P-CCNC: 18 U/L (ref 10–44)
ANION GAP SERPL CALC-SCNC: 13 MMOL/L (ref 8–16)
AST SERPL-CCNC: 18 U/L (ref 10–40)
BASOPHILS # BLD AUTO: 0.06 K/UL (ref 0–0.2)
BASOPHILS NFR BLD: 0.8 % (ref 0–1.9)
BILIRUB SERPL-MCNC: 0.6 MG/DL (ref 0.1–1)
BUN SERPL-MCNC: 12 MG/DL (ref 8–23)
CALCIUM SERPL-MCNC: 9.8 MG/DL (ref 8.7–10.5)
CHLORIDE SERPL-SCNC: 104 MMOL/L (ref 95–110)
CO2 SERPL-SCNC: 27 MMOL/L (ref 23–29)
CREAT SERPL-MCNC: 0.9 MG/DL (ref 0.5–1.4)
CRP SERPL-MCNC: 2.5 MG/L (ref 0–8.2)
DIFFERENTIAL METHOD: ABNORMAL
EOSINOPHIL # BLD AUTO: 0.6 K/UL (ref 0–0.5)
EOSINOPHIL NFR BLD: 7.7 % (ref 0–8)
ERYTHROCYTE [DISTWIDTH] IN BLOOD BY AUTOMATED COUNT: 13 % (ref 11.5–14.5)
EST. GFR  (AFRICAN AMERICAN): >60 ML/MIN/1.73 M^2
EST. GFR  (NON AFRICAN AMERICAN): >60 ML/MIN/1.73 M^2
GLUCOSE SERPL-MCNC: 104 MG/DL (ref 70–110)
HCT VFR BLD AUTO: 44.5 % (ref 40–54)
HGB BLD-MCNC: 14.1 G/DL (ref 14–18)
IMM GRANULOCYTES # BLD AUTO: 0.04 K/UL (ref 0–0.04)
IMM GRANULOCYTES NFR BLD AUTO: 0.5 % (ref 0–0.5)
LYMPHOCYTES # BLD AUTO: 1.9 K/UL (ref 1–4.8)
LYMPHOCYTES NFR BLD: 24.2 % (ref 18–48)
MCH RBC QN AUTO: 29.4 PG (ref 27–31)
MCHC RBC AUTO-ENTMCNC: 31.7 G/DL (ref 32–36)
MCV RBC AUTO: 93 FL (ref 82–98)
MONOCYTES # BLD AUTO: 0.9 K/UL (ref 0.3–1)
MONOCYTES NFR BLD: 11.4 % (ref 4–15)
NEUTROPHILS # BLD AUTO: 4.2 K/UL (ref 1.8–7.7)
NEUTROPHILS NFR BLD: 55.4 % (ref 38–73)
NRBC BLD-RTO: 0 /100 WBC
PLATELET # BLD AUTO: 239 K/UL (ref 150–450)
PMV BLD AUTO: 9.9 FL (ref 9.2–12.9)
POTASSIUM SERPL-SCNC: 4.2 MMOL/L (ref 3.5–5.1)
PROT SERPL-MCNC: 7.6 G/DL (ref 6–8.4)
RBC # BLD AUTO: 4.79 M/UL (ref 4.6–6.2)
SODIUM SERPL-SCNC: 144 MMOL/L (ref 136–145)
WBC # BLD AUTO: 7.64 K/UL (ref 3.9–12.7)

## 2022-02-11 PROCEDURE — 1160F RVW MEDS BY RX/DR IN RCRD: CPT | Mod: HCNC,CPTII,S$GLB, | Performed by: NURSE PRACTITIONER

## 2022-02-11 PROCEDURE — 80053 COMPREHEN METABOLIC PANEL: CPT | Mod: HCNC | Performed by: NURSE PRACTITIONER

## 2022-02-11 PROCEDURE — 1159F PR MEDICATION LIST DOCUMENTED IN MEDICAL RECORD: ICD-10-PCS | Mod: HCNC,CPTII,S$GLB, | Performed by: NURSE PRACTITIONER

## 2022-02-11 PROCEDURE — 85025 COMPLETE CBC W/AUTO DIFF WBC: CPT | Mod: HCNC | Performed by: NURSE PRACTITIONER

## 2022-02-11 PROCEDURE — 3288F PR FALLS RISK ASSESSMENT DOCUMENTED: ICD-10-PCS | Mod: HCNC,CPTII,S$GLB, | Performed by: NURSE PRACTITIONER

## 2022-02-11 PROCEDURE — 99214 PR OFFICE/OUTPT VISIT, EST, LEVL IV, 30-39 MIN: ICD-10-PCS | Mod: HCNC,S$GLB,, | Performed by: NURSE PRACTITIONER

## 2022-02-11 PROCEDURE — 1126F AMNT PAIN NOTED NONE PRSNT: CPT | Mod: HCNC,CPTII,S$GLB, | Performed by: NURSE PRACTITIONER

## 2022-02-11 PROCEDURE — 99214 OFFICE O/P EST MOD 30 MIN: CPT | Mod: HCNC,S$GLB,, | Performed by: NURSE PRACTITIONER

## 2022-02-11 PROCEDURE — 1101F PT FALLS ASSESS-DOCD LE1/YR: CPT | Mod: HCNC,CPTII,S$GLB, | Performed by: NURSE PRACTITIONER

## 2022-02-11 PROCEDURE — 36415 COLL VENOUS BLD VENIPUNCTURE: CPT | Mod: HCNC,PO | Performed by: NURSE PRACTITIONER

## 2022-02-11 PROCEDURE — 86140 C-REACTIVE PROTEIN: CPT | Mod: HCNC | Performed by: NURSE PRACTITIONER

## 2022-02-11 PROCEDURE — 1126F PR PAIN SEVERITY QUANTIFIED, NO PAIN PRESENT: ICD-10-PCS | Mod: HCNC,CPTII,S$GLB, | Performed by: NURSE PRACTITIONER

## 2022-02-11 PROCEDURE — 1159F MED LIST DOCD IN RCRD: CPT | Mod: HCNC,CPTII,S$GLB, | Performed by: NURSE PRACTITIONER

## 2022-02-11 PROCEDURE — 1160F PR REVIEW ALL MEDS BY PRESCRIBER/CLIN PHARMACIST DOCUMENTED: ICD-10-PCS | Mod: HCNC,CPTII,S$GLB, | Performed by: NURSE PRACTITIONER

## 2022-02-11 PROCEDURE — 1101F PR PT FALLS ASSESS DOC 0-1 FALLS W/OUT INJ PAST YR: ICD-10-PCS | Mod: HCNC,CPTII,S$GLB, | Performed by: NURSE PRACTITIONER

## 2022-02-11 PROCEDURE — 99999 PR PBB SHADOW E&M-EST. PATIENT-LVL IV: CPT | Mod: PBBFAC,HCNC,, | Performed by: NURSE PRACTITIONER

## 2022-02-11 PROCEDURE — 3288F FALL RISK ASSESSMENT DOCD: CPT | Mod: HCNC,CPTII,S$GLB, | Performed by: NURSE PRACTITIONER

## 2022-02-11 PROCEDURE — 99999 PR PBB SHADOW E&M-EST. PATIENT-LVL IV: ICD-10-PCS | Mod: PBBFAC,HCNC,, | Performed by: NURSE PRACTITIONER

## 2022-02-11 NOTE — PROGRESS NOTES
Subjective:       Patient ID: Tres Peace is a 76 y.o. male, Body mass index is 25.4 kg/m².    Chief Complaint: Rectal Bleeding      Patient is new to me. Established patient of Dr. Carvalho.     GI Problem  The primary symptoms include diarrhea and hematochezia (Chief complaint). Primary symptoms do not include fever, weight loss, fatigue, abdominal pain, nausea, vomiting, melena, hematemesis, jaundice, dysuria or rash.   The diarrhea began more than 1 week ago (Started ~ two weeks ago). The diarrhea is watery and bloody. The diarrhea occurs 2 to 4 times per day (does not wake him at night; associated with fecal urgency). Risk factors: denies recent antibiotics, hospitalization, or foreign travel.   The hematochezia began more than 1 week ago (Started ~ two weeks ago; gradually improving). The hematochezia has occurred 2 to 5 times per day (reports small amount of bright red blood in stool; denies bleeding in between bowel movements; reports he decreased his alcohol consumption and this has improved rectal bleeding). The hematochezia is a recurrent problem.   The illness does not include chills, anorexia, dysphagia, odynophagia, bloating or constipation. Associated symptoms comments: Associated symptoms include flatus. Significant associated medical issues include inflammatory bowel disease (Hx of UC- took Lialda and Apriso in the past; not taking any medications currently), GERD (Hx of GERD- well controlled taking Omeprazole) and hemorrhoids. Associated medical issues do not include gallstones, liver disease, alcohol abuse, PUD, gastric bypass, bowel resection, irritable bowel syndrome or diverticulitis.     Review of Systems   Constitutional: Negative for appetite change, chills, fatigue, fever, unexpected weight change and weight loss.   HENT: Negative for trouble swallowing.    Respiratory: Negative for cough and shortness of breath.    Cardiovascular: Negative for chest pain.   Gastrointestinal: Positive for  anal bleeding, blood in stool, diarrhea and hematochezia (Chief complaint). Negative for abdominal distention, abdominal pain, anorexia, bloating, constipation, dysphagia, hematemesis, jaundice, melena, nausea, rectal pain and vomiting.   Genitourinary: Negative for difficulty urinating and dysuria.   Musculoskeletal: Negative for gait problem.   Skin: Negative for rash.   Neurological: Negative for speech difficulty.   Psychiatric/Behavioral: Negative for confusion.       Past Medical History:   Diagnosis Date    Basal cell carcinoma of cheek     BCC (basal cell carcinoma of skin)     Dr. Shaggy Ross, [facility] surgery    Diabetes mellitus type II     Diabetes with neurologic complications     GERD (gastroesophageal reflux disease)     Hyperlipidemia     Hypertension     Type II or unspecified type diabetes mellitus without mention of complication, not stated as uncontrolled 10/22/2012    Ulcerative colitis, chronic 10/22/2012    Sees Dr. Franco Herndon      Past Surgical History:   Procedure Laterality Date    CIRCUMCISION      COLONOSCOPY N/A 6/23/2016    Procedure: COLONOSCOPY;  Surgeon: Arnoldo Carvalho Jr., MD;  Location: [facility] ENDO;  Service: Endoscopy;  Laterality: N/A;    COLONOSCOPY N/A 2/2/2021    Procedure: COLONOSCOPY;  Surgeon: Arnoldo Carvalho Jr., MD;  Location: [facility] ENDO;  Service: Endoscopy;  Laterality: N/A;    COLONOSCOPY W/ BIOPSIES  12/20/2010    [facility].   Mod to severe inflammation throughout the colon, c/w ulcerative colitis.    COLONOSCOPY W/ BIOPSIES  07/18/2013    [facility].   Post-ulcerative colitis scars in the entire colon.  Pseudopolyps from the descending to the ascending colon.  Sigmoid diverticulosis.  Internal hemorrhoids.    COLONOSCOPY W/ POLYPECTOMY  05/03/2012    [facility].   One 5 mm polyp in the transverse colon.  JUVENILE POLYP.  Sigmoid diverticulosis.  No active inflammation. but pseudopolyps and mucosal scarring.      ESOPHAGOGASTRODUODENOSCOPY  12/20/2010     [facility].   Normal esophagua.  Granular mucosa in the stomach.  Gastritis.  4 mm duodenal bulb ulcer.      ESOPHAGOGASTRODUODENOSCOPY  05/03/2012    [facility].   Grade A reflux esophagitis.  Small hiatal hernia.  Gastritis.  4 mm duodenal bulb ulcer.  8 mm duod ulcer 2nd part. Duod stenosis 1st part duod.    ESOPHAGOGASTRODUODENOSCOPY  07/18/2013    [facility].   Reflux esophagitis. Gastritis/antritis. CLOtest negative.  Erythematous mucosa was found in the duodenal bulb.  Duodenal diverticulum.      SKIN BIOPSY      skin CA removal    TONSILLECTOMY      VASECTOMY        Family History   Problem Relation Age of Onset    Cancer Mother 84        breast ca    Stroke Father     Heart disease Brother     Heart disease Sister     Melanoma Neg Hx       Wt Readings from Last 10 Encounters:   02/11/22 80.3 kg (177 lb 0.5 oz)   10/05/21 81.6 kg (180 lb)   09/09/21 82 kg (180 lb 12.4 oz)   03/05/21 82 kg (180 lb 12.4 oz)   01/29/21 80.7 kg (178 lb)   06/09/20 82.5 kg (181 lb 14.1 oz)   12/06/19 78.7 kg (173 lb 8 oz)   10/14/19 79.4 kg (175 lb)   07/18/19 80.7 kg (178 lb)   06/26/19 80.1 kg (176 lb 9.4 oz)     Lab Results   Component Value Date    WBC 8.70 09/09/2021    HGB 13.8 (L) 09/09/2021    HCT 42.5 09/09/2021    MCV 91 09/09/2021     09/09/2021     CMP  Sodium   Date Value Ref Range Status   09/09/2021 141 136 - 145 mmol/L Final     Potassium   Date Value Ref Range Status   09/09/2021 4.9 3.5 - 5.1 mmol/L Final     Chloride   Date Value Ref Range Status   09/09/2021 105 95 - 110 mmol/L Final     CO2   Date Value Ref Range Status   09/09/2021 27 23 - 29 mmol/L Final     Glucose   Date Value Ref Range Status   09/09/2021 134 (H) 70 - 110 mg/dL Final     BUN   Date Value Ref Range Status   09/09/2021 11 8 - 23 mg/dL Final     Creatinine   Date Value Ref Range Status   09/09/2021 0.9 0.5 - 1.4 mg/dL Final     Calcium   Date Value Ref Range Status   09/09/2021 9.4 8.7 - 10.5 mg/dL Final     Total Protein   Date  Value Ref Range Status   09/09/2021 7.0 6.0 - 8.4 g/dL Final     Albumin   Date Value Ref Range Status   09/09/2021 3.9 3.5 - 5.2 g/dL Final     Total Bilirubin   Date Value Ref Range Status   09/09/2021 0.4 0.1 - 1.0 mg/dL Final     Comment:     For infants and newborns, interpretation of results should be based  on gestational age, weight and in agreement with clinical  observations.    Premature Infant recommended reference ranges:  Up to 24 hours.............<8.0 mg/dL  Up to 48 hours............<12.0 mg/dL  3-5 days..................<15.0 mg/dL  6-29 days.................<15.0 mg/dL       Alkaline Phosphatase   Date Value Ref Range Status   09/09/2021 99 55 - 135 U/L Final     AST   Date Value Ref Range Status   09/09/2021 22 10 - 40 U/L Final     ALT   Date Value Ref Range Status   09/09/2021 21 10 - 44 U/L Final     Anion Gap   Date Value Ref Range Status   09/09/2021 9 8 - 16 mmol/L Final     eGFR if    Date Value Ref Range Status   09/09/2021 >60.0 >60 mL/min/1.73 m^2 Final     eGFR if non    Date Value Ref Range Status   09/09/2021 >60.0 >60 mL/min/1.73 m^2 Final     Comment:     Calculation used to obtain the estimated glomerular filtration  rate (eGFR) is the CKD-EPI equation.                 Reviewed prior medical records including endoscopy history (see surgical history).     Objective:      Physical Exam  Constitutional:       General: He is not in acute distress.Vital signs are normal.      Appearance: He is well-developed and well-nourished. He is not sickly-appearing.   HENT:      Head: Normocephalic.      Right Ear: Hearing normal.      Left Ear: Hearing normal.      Nose: Nose normal.      Mouth/Throat:      Mouth: Oropharynx is clear and moist and mucous membranes are normal.      Comments: Pt wearing mask due to COVID concerns  Eyes:      General: Lids are normal.      Conjunctiva/sclera: Conjunctivae normal.      Pupils: Pupils are equal, round, and reactive to  light.   Neck:      Trachea: Trachea normal.   Cardiovascular:      Rate and Rhythm: Normal rate and regular rhythm.      Heart sounds: Normal heart sounds. No murmur heard.      Pulmonary:      Effort: Pulmonary effort is normal. No respiratory distress.      Breath sounds: Normal breath sounds. No stridor. No wheezing.   Abdominal:      General: Bowel sounds are normal. There is no distension or ascites.      Palpations: Abdomen is soft. There is no mass.      Tenderness: There is no abdominal tenderness. There is no guarding or rebound.   Musculoskeletal:         General: Normal range of motion.      Cervical back: Normal range of motion.   Skin:     General: Skin is warm, dry and intact.      Findings: No rash.      Comments: Non jaundiced   Neurological:      Mental Status: He is alert and oriented to person, place, and time.   Psychiatric:         Mood and Affect: Mood and affect normal.         Speech: Speech normal.         Behavior: Behavior normal. Behavior is cooperative.           Assessment:       1. Blood in stool    2. Diarrhea, unspecified type    3. History of ulcerative colitis    4. History of gastroesophageal reflux (GERD)    5. History of colon polyps           Plan:   All diagnoses and orders for this visit:    Blood in stool  - CBC Auto Differential; Future; Expected date: 02/11/2022  - C-reactive protein; Future; Expected date: 02/11/2022  - Comprehensive Metabolic Panel; Future; Expected date: 02/11/2022  - Discussed about different etiologies that can cause rectal bleeding, such as but not limited to diverticulosis, polyps, colon mass, colon inflammation or infection, anal fissure or hemorrhoids.   - Schedule Colonoscopy, discussed procedure with the patient, verbalized understanding   - Avoid/minimize aspirin and anti-inflammatory drugs such as ibuprofen (Advil, Motrin) and naproxen (Aleve and Naprosyn).    Diarrhea, unspecified type & History of ulcerative colitis  - Giardia /  Cryptosporidum, EIA; Future; Expected date: 02/11/2022  - Stool Exam-Ova,Cysts,Parasites; Future; Expected date: 02/11/2022  - Stool culture; Future; Expected date: 02/11/2022  - Occult blood x 1, stool; Future; Expected date: 02/11/2022  - WBC, Stool; Future; Expected date: 02/11/2022  - Calprotectin, Stool; Future; Expected date: 02/11/2022  - Clostridium difficile EIA; Future; Expected date: 02/11/2022  - Recommended increase fiber in diet, especially soluble fiber since this can help bulk up the stool consistency and may help to slow down how fast the stool goes through the colon and can prevent diarrhea  - Schedule Colonoscopy    History of gastroesophageal reflux (GERD)   - Continue Omeprazole 40 mg once daily   - Take PPI in the morning 30 minutes before breakfast   - Recommend to avoid large meals, avoid eating within 3 hours of bedtime, elevate head of bed if nocturnal symptoms are present, smoking cessation (if current smoker), & weight loss (if overweight).    - Recommend minimize/avoid high-fat foods, chocolate, caffeine, citrus, alcohol, & tomato products.   - Advised to avoid/limit use of NSAID's, since they can cause GI upset, bleeding, and/or ulcers. If needed, take with food.     History of colon polyps   - Schedule Colonoscopy    If no improvement in symptoms or symptoms worsen, call/follow-up at clinic or go to ER

## 2022-02-11 NOTE — TELEPHONE ENCOUNTER
----- Message from Paz Gomez NP sent at 2/11/2022  2:46 PM CST -----  Let patient know his blood counts, liver and kidney function, electrolytes, and inflammatory marker are normal.

## 2022-02-11 NOTE — PATIENT INSTRUCTIONS
Patient Education       Bloody Stools Discharge Instructions, Adult   About this topic   Blood in the stool is often a sign of a problem in the bowel. Many things can cause blood in the stool. Some are serious things like bleeding from the intestine, cancer, or a serious bacterial infection. Most often it is not serious. It may come from a swollen blood vessel around where the stool comes out. This is called a hemorrhoid. It may also come from small rips in the skin near where the stool comes out. This is an anal fissure. Blood may be seen as a maroon color, bright red, or a black tar.  Sometimes the doctors cannot find a specific cause. It may be a sign of some other illness. The doctor can test the stool to try to find out the reason for the blood.  Bleeding in the stool is more concerning when it occurs often, becomes constant, or comes with other symptoms like fever.  Treatment will depend on the cause of the bleeding.     What care is needed at home?   · Ask your doctor what you need to do when you go home. Make sure you ask questions if you do not understand what the doctor says. This way you will know what you need to do.  · Take all your medicines as ordered.  · If ordered, sit in a warm water tub deep enough to cover the hips and buttocks for 15 minutes. Do this 2 to 3 times each day to help with itching or pain.  · You may have to save your stool and take a sample to the lab.  · Try to avoid straining when you have a bowel movement.  · Doing the following can help you avoid constipation:  ? Eat high fiber foods. These include whole grains, fruits, and vegetables.  ? Drink plenty of water and other fluids each day.  ? Get regular exercise.  ? Sit in a warm bath a few times a day for about 15 minutes each time. This can help you relax and feel like you can have a bowel movement.  · Keep a written diary of how often you have bloody stools.  What follow-up care is needed?   Your doctor may ask you to make  visits to the office to check on your progress. Be sure to keep these visits.  Your doctor may order lab tests or other tests to determine what problems you are having and how well you are healing. Be sure to keep these visits.  What drugs may be needed?   The doctor may order drugs to:  · Help relieve pain, itching, and swelling  · Soften stools and reduce straining  · Treat constipation  · Help with stomach cramps  · Decrease stomach acid  Will physical activity be limited?   Exercise may help with digestion and could prevent hard stools. Ask your doctor about the right amount of activity for you.  What changes to diet are needed?   · Drink lots of fluids. Drink at least 8 to 10 glasses every day.  · Eat high fiber foods like fruits and vegetables. This will help lessen the strain during bowel movement.  · If they bother you, avoid eating acidic foods like spices, citrus fruits, tomatoes, onion, garlic, and cranberries.  · Avoid caffeinated and carbonated drinks. These may bother the stomach.  · Do not drink beer, wine, and mixed drinks (alcohol).  What problems could happen?   · Stomach pain  · Anal pain, itching, and swelling  · Pain during bowel movements  · Hard stools if holding stools due to pain  · Need to have more tests if you do not get better  · Surgery to fix a problem that will not go away  When do I need to call the doctor?   · Fever of 100.4°F (38°C) or higher  · Very bad belly pain  · Very bad headache with bloody stool  · Bleeding gets worse  · Weight loss  · Very bad loose stools  · Throwing up over and over  · You feel like you may pass out  Helpful tips   · Vitamins and supplements with iron will cause you to have dark-colored stools.  · Blue, red, and purple foods like blueberries, beets, and blackberries may cause a bloody-like stool.  Teach Back: Helping You Understand   The Teach Back Method helps you understand the information we are giving you. After you talk with the staff, tell them in  your own words what you learned. This helps to make sure the staff has described each thing clearly. It also helps to explain things that may have been confusing. Before going home, make sure you can do these:  · I can tell you about my condition.  · I can tell you what changes I need to make with my diet or drugs.  · I can tell you what I will do if I have very bad loose stools or more bleeding.  Last Reviewed Date   2021-06-04  Consumer Information Use and Disclaimer   This information is not specific medical advice and does not replace information you receive from your health care provider. This is only a brief summary of general information. It does NOT include all information about conditions, illnesses, injuries, tests, procedures, treatments, therapies, discharge instructions or life-style choices that may apply to you. You must talk with your health care provider for complete information about your health and treatment options. This information should not be used to decide whether or not to accept your health care providers advice, instructions or recommendations. Only your health care provider has the knowledge and training to provide advice that is right for you.  Copyright   Copyright © 2021 [employer] and its affiliates and/or licensors. All rights reserved.

## 2022-02-15 ENCOUNTER — LAB VISIT (OUTPATIENT)
Dept: LAB | Facility: HOSPITAL | Age: 77
End: 2022-02-15
Attending: FAMILY MEDICINE
Payer: MEDICARE

## 2022-02-15 DIAGNOSIS — Z87.19 HISTORY OF ULCERATIVE COLITIS: ICD-10-CM

## 2022-02-15 DIAGNOSIS — R19.7 DIARRHEA, UNSPECIFIED TYPE: ICD-10-CM

## 2022-02-15 PROCEDURE — 87449 NOS EACH ORGANISM AG IA: CPT | Mod: HCNC | Performed by: NURSE PRACTITIONER

## 2022-02-15 PROCEDURE — 87427 SHIGA-LIKE TOXIN AG IA: CPT | Mod: 59,HCNC | Performed by: NURSE PRACTITIONER

## 2022-02-15 PROCEDURE — 82272 OCCULT BLD FECES 1-3 TESTS: CPT | Mod: HCNC | Performed by: NURSE PRACTITIONER

## 2022-02-15 PROCEDURE — 89055 LEUKOCYTE ASSESSMENT FECAL: CPT | Mod: HCNC | Performed by: NURSE PRACTITIONER

## 2022-02-15 PROCEDURE — 87209 SMEAR COMPLEX STAIN: CPT | Mod: HCNC | Performed by: NURSE PRACTITIONER

## 2022-02-15 PROCEDURE — 87045 FECES CULTURE AEROBIC BACT: CPT | Mod: HCNC | Performed by: NURSE PRACTITIONER

## 2022-02-15 PROCEDURE — 87329 GIARDIA AG IA: CPT | Mod: HCNC | Performed by: NURSE PRACTITIONER

## 2022-02-15 PROCEDURE — 87177 OVA AND PARASITES SMEARS: CPT | Mod: HCNC | Performed by: NURSE PRACTITIONER

## 2022-02-15 PROCEDURE — 83993 ASSAY FOR CALPROTECTIN FECAL: CPT | Mod: HCNC | Performed by: NURSE PRACTITIONER

## 2022-02-17 ENCOUNTER — TELEPHONE (OUTPATIENT)
Dept: FAMILY MEDICINE | Facility: CLINIC | Age: 77
End: 2022-02-17
Payer: MEDICARE

## 2022-02-17 DIAGNOSIS — Z87.19 HISTORY OF ULCERATIVE COLITIS: ICD-10-CM

## 2022-02-17 DIAGNOSIS — R19.7 DIARRHEA, UNSPECIFIED TYPE: Primary | ICD-10-CM

## 2022-02-17 NOTE — TELEPHONE ENCOUNTER
----- Message from Sierra Landrum sent at 2/17/2022 11:30 AM CST -----  Regarding: Lab test cancellation  Laboratory accident occured while handling the stool specimen submitted on 2/15/2022.  The lab is unable to locate specimen for testing. The orders have been cancelled and will need to be reordered and recollected.

## 2022-02-17 NOTE — TELEPHONE ENCOUNTER
Called an notified patient that a new stool sample was needed. Pt states he will  a stool kit tomorrow from clinic.

## 2022-02-18 ENCOUNTER — TELEPHONE (OUTPATIENT)
Dept: GASTROENTEROLOGY | Facility: CLINIC | Age: 77
End: 2022-02-18
Payer: MEDICARE

## 2022-02-18 NOTE — TELEPHONE ENCOUNTER
----- Message from Janes Sahni sent at 2/18/2022  2:50 AM CST -----  Regarding: Lab Client Services  Contact: [phone]  Good Morning,     My name is Janes Sahni I work in the Lab Client Services. We had a problem with some lab work on this patient. If someone from your office could call us at [phone] or [phone] that would be great. Anyone in my department can help.      Thank you

## 2022-03-10 ENCOUNTER — LAB VISIT (OUTPATIENT)
Dept: LAB | Facility: HOSPITAL | Age: 77
End: 2022-03-10
Attending: FAMILY MEDICINE
Payer: MEDICARE

## 2022-03-10 ENCOUNTER — OFFICE VISIT (OUTPATIENT)
Dept: FAMILY MEDICINE | Facility: CLINIC | Age: 77
End: 2022-03-10
Payer: MEDICARE

## 2022-03-10 VITALS
WEIGHT: 175.06 LBS | BODY MASS INDEX: 25.06 KG/M2 | HEART RATE: 75 BPM | OXYGEN SATURATION: 95 % | HEIGHT: 70 IN | SYSTOLIC BLOOD PRESSURE: 124 MMHG | DIASTOLIC BLOOD PRESSURE: 60 MMHG

## 2022-03-10 DIAGNOSIS — E11.69 TYPE 2 DIABETES MELLITUS WITH HYPERLIPIDEMIA: ICD-10-CM

## 2022-03-10 DIAGNOSIS — E78.5 HYPERLIPIDEMIA, UNSPECIFIED HYPERLIPIDEMIA TYPE: ICD-10-CM

## 2022-03-10 DIAGNOSIS — R97.20 ELEVATED PSA: ICD-10-CM

## 2022-03-10 DIAGNOSIS — K51.919 ULCERATIVE COLITIS WITH COMPLICATION, UNSPECIFIED LOCATION: ICD-10-CM

## 2022-03-10 DIAGNOSIS — I10 ESSENTIAL HYPERTENSION: Primary | ICD-10-CM

## 2022-03-10 DIAGNOSIS — E78.5 TYPE 2 DIABETES MELLITUS WITH HYPERLIPIDEMIA: ICD-10-CM

## 2022-03-10 DIAGNOSIS — Z87.19 HISTORY OF ULCERATIVE COLITIS: ICD-10-CM

## 2022-03-10 DIAGNOSIS — R19.7 DIARRHEA, UNSPECIFIED TYPE: ICD-10-CM

## 2022-03-10 DIAGNOSIS — Z12.5 ENCOUNTER FOR SCREENING FOR MALIGNANT NEOPLASM OF PROSTATE: ICD-10-CM

## 2022-03-10 LAB
ALBUMIN SERPL BCP-MCNC: 3.9 G/DL (ref 3.5–5.2)
ALP SERPL-CCNC: 148 U/L (ref 55–135)
ALT SERPL W/O P-5'-P-CCNC: 27 U/L (ref 10–44)
ANION GAP SERPL CALC-SCNC: 9 MMOL/L (ref 8–16)
AST SERPL-CCNC: 25 U/L (ref 10–40)
BILIRUB SERPL-MCNC: 0.7 MG/DL (ref 0.1–1)
BUN SERPL-MCNC: 10 MG/DL (ref 8–23)
CALCIUM SERPL-MCNC: 9.2 MG/DL (ref 8.7–10.5)
CHLORIDE SERPL-SCNC: 107 MMOL/L (ref 95–110)
CO2 SERPL-SCNC: 28 MMOL/L (ref 23–29)
COMPLEXED PSA SERPL-MCNC: 5 NG/ML (ref 0–4)
CREAT SERPL-MCNC: 0.8 MG/DL (ref 0.5–1.4)
EST. GFR  (AFRICAN AMERICAN): >60 ML/MIN/1.73 M^2
EST. GFR  (NON AFRICAN AMERICAN): >60 ML/MIN/1.73 M^2
ESTIMATED AVG GLUCOSE: 171 MG/DL (ref 68–131)
GLUCOSE SERPL-MCNC: 78 MG/DL (ref 70–110)
HBA1C MFR BLD: 7.6 % (ref 4–5.6)
POTASSIUM SERPL-SCNC: 4.3 MMOL/L (ref 3.5–5.1)
PROT SERPL-MCNC: 7.3 G/DL (ref 6–8.4)
SODIUM SERPL-SCNC: 144 MMOL/L (ref 136–145)

## 2022-03-10 PROCEDURE — 99214 OFFICE O/P EST MOD 30 MIN: CPT | Mod: S$GLB,,, | Performed by: FAMILY MEDICINE

## 2022-03-10 PROCEDURE — 1126F AMNT PAIN NOTED NONE PRSNT: CPT | Mod: CPTII,S$GLB,, | Performed by: FAMILY MEDICINE

## 2022-03-10 PROCEDURE — 99999 PR PBB SHADOW E&M-EST. PATIENT-LVL IV: CPT | Mod: PBBFAC,,, | Performed by: FAMILY MEDICINE

## 2022-03-10 PROCEDURE — 1160F PR REVIEW ALL MEDS BY PRESCRIBER/CLIN PHARMACIST DOCUMENTED: ICD-10-PCS | Mod: CPTII,S$GLB,, | Performed by: FAMILY MEDICINE

## 2022-03-10 PROCEDURE — 83036 HEMOGLOBIN GLYCOSYLATED A1C: CPT | Performed by: FAMILY MEDICINE

## 2022-03-10 PROCEDURE — 3078F DIAST BP <80 MM HG: CPT | Mod: CPTII,S$GLB,, | Performed by: FAMILY MEDICINE

## 2022-03-10 PROCEDURE — 84153 ASSAY OF PSA TOTAL: CPT | Mod: GA | Performed by: FAMILY MEDICINE

## 2022-03-10 PROCEDURE — 1126F PR PAIN SEVERITY QUANTIFIED, NO PAIN PRESENT: ICD-10-PCS | Mod: CPTII,S$GLB,, | Performed by: FAMILY MEDICINE

## 2022-03-10 PROCEDURE — 99499 UNLISTED E&M SERVICE: CPT | Mod: HCNC,S$GLB,, | Performed by: FAMILY MEDICINE

## 2022-03-10 PROCEDURE — 99499 RISK ADDL DX/OHS AUDIT: ICD-10-PCS | Mod: HCNC,S$GLB,, | Performed by: FAMILY MEDICINE

## 2022-03-10 PROCEDURE — 3288F PR FALLS RISK ASSESSMENT DOCUMENTED: ICD-10-PCS | Mod: CPTII,S$GLB,, | Performed by: FAMILY MEDICINE

## 2022-03-10 PROCEDURE — 36415 COLL VENOUS BLD VENIPUNCTURE: CPT | Mod: PO | Performed by: FAMILY MEDICINE

## 2022-03-10 PROCEDURE — 3288F FALL RISK ASSESSMENT DOCD: CPT | Mod: CPTII,S$GLB,, | Performed by: FAMILY MEDICINE

## 2022-03-10 PROCEDURE — 99999 PR PBB SHADOW E&M-EST. PATIENT-LVL IV: ICD-10-PCS | Mod: PBBFAC,,, | Performed by: FAMILY MEDICINE

## 2022-03-10 PROCEDURE — 80053 COMPREHEN METABOLIC PANEL: CPT | Performed by: FAMILY MEDICINE

## 2022-03-10 PROCEDURE — 1159F MED LIST DOCD IN RCRD: CPT | Mod: CPTII,S$GLB,, | Performed by: FAMILY MEDICINE

## 2022-03-10 PROCEDURE — 3074F SYST BP LT 130 MM HG: CPT | Mod: CPTII,S$GLB,, | Performed by: FAMILY MEDICINE

## 2022-03-10 PROCEDURE — 1101F PT FALLS ASSESS-DOCD LE1/YR: CPT | Mod: CPTII,S$GLB,, | Performed by: FAMILY MEDICINE

## 2022-03-10 PROCEDURE — 1160F RVW MEDS BY RX/DR IN RCRD: CPT | Mod: CPTII,S$GLB,, | Performed by: FAMILY MEDICINE

## 2022-03-10 PROCEDURE — 3051F PR MOST RECENT HEMOGLOBIN A1C LEVEL 7.0 - < 8.0%: ICD-10-PCS | Mod: CPTII,S$GLB,, | Performed by: FAMILY MEDICINE

## 2022-03-10 PROCEDURE — 99214 PR OFFICE/OUTPT VISIT, EST, LEVL IV, 30-39 MIN: ICD-10-PCS | Mod: S$GLB,,, | Performed by: FAMILY MEDICINE

## 2022-03-10 PROCEDURE — 3074F PR MOST RECENT SYSTOLIC BLOOD PRESSURE < 130 MM HG: ICD-10-PCS | Mod: CPTII,S$GLB,, | Performed by: FAMILY MEDICINE

## 2022-03-10 PROCEDURE — 1101F PR PT FALLS ASSESS DOC 0-1 FALLS W/OUT INJ PAST YR: ICD-10-PCS | Mod: CPTII,S$GLB,, | Performed by: FAMILY MEDICINE

## 2022-03-10 PROCEDURE — 3051F HG A1C>EQUAL 7.0%<8.0%: CPT | Mod: CPTII,S$GLB,, | Performed by: FAMILY MEDICINE

## 2022-03-10 PROCEDURE — 1159F PR MEDICATION LIST DOCUMENTED IN MEDICAL RECORD: ICD-10-PCS | Mod: CPTII,S$GLB,, | Performed by: FAMILY MEDICINE

## 2022-03-10 PROCEDURE — 3078F PR MOST RECENT DIASTOLIC BLOOD PRESSURE < 80 MM HG: ICD-10-PCS | Mod: CPTII,S$GLB,, | Performed by: FAMILY MEDICINE

## 2022-03-10 NOTE — PROGRESS NOTES
Subjective:       Patient ID: Tres Peace is a 76 y.o. male.    Chief Complaint: Follow-up (Patient having colonoscopy on 3/31)    Here for f/u htn and lipids. Had blood in stool and cscope later this month. No more blood and asymptomatic but hx of UC.    Hypertension  This is a chronic problem. The current episode started more than 1 year ago. The problem is controlled. Pertinent negatives include no chest pain, palpitations or shortness of breath.   Hyperlipidemia  This is a chronic problem. The current episode started more than 1 year ago. The problem is controlled. Pertinent negatives include no chest pain or shortness of breath.     Review of Systems   Constitutional: Negative for chills and fever.   Respiratory: Negative for cough, chest tightness and shortness of breath.    Cardiovascular: Negative for chest pain, palpitations and leg swelling.   Endocrine: Negative for cold intolerance and heat intolerance.   Psychiatric/Behavioral: Negative for decreased concentration. The patient is not nervous/anxious.        Objective:      Physical Exam  Vitals and nursing note reviewed.   Constitutional:       Appearance: He is well-developed.   HENT:      Head: Normocephalic and atraumatic.   Cardiovascular:      Rate and Rhythm: Normal rate and regular rhythm.      Heart sounds: Normal heart sounds.   Pulmonary:      Effort: Pulmonary effort is normal.      Breath sounds: Normal breath sounds.         Assessment:       1. Essential hypertension    2. Hyperlipidemia, unspecified hyperlipidemia type    3. Type 2 diabetes mellitus with hyperlipidemia    4. Ulcerative colitis with complication, unspecified location    5. Elevated PSA    6. Encounter for screening for malignant neoplasm of prostate         Plan:       Essential hypertension    Hyperlipidemia, unspecified hyperlipidemia type    Type 2 diabetes mellitus with hyperlipidemia  -     Comprehensive Metabolic Panel; Future; Expected date: 03/10/2022  -      Hemoglobin A1C; Future; Expected date: 03/10/2022    Ulcerative colitis with complication, unspecified location    Elevated PSA  -     PSA, Screening; Future; Expected date: 03/10/2022    Encounter for screening for malignant neoplasm of prostate   -     PSA, Screening; Future; Expected date: 03/10/2022      Hx of elevated psa and monitored by urology ;recheck and if elevated f/u with them  htn stable  Diabetes stable  Will monitor chronic medical issues and continue current plan of care.    Follow up in about 6 months (around 9/10/2022), or if symptoms worsen or fail to improve.

## 2022-03-11 ENCOUNTER — PATIENT MESSAGE (OUTPATIENT)
Dept: FAMILY MEDICINE | Facility: CLINIC | Age: 77
End: 2022-03-11
Payer: MEDICARE

## 2022-03-15 ENCOUNTER — PATIENT MESSAGE (OUTPATIENT)
Dept: GASTROENTEROLOGY | Facility: CLINIC | Age: 77
End: 2022-03-15
Payer: MEDICARE

## 2022-03-15 ENCOUNTER — TELEPHONE (OUTPATIENT)
Dept: GASTROENTEROLOGY | Facility: CLINIC | Age: 77
End: 2022-03-15
Payer: MEDICARE

## 2022-03-16 ENCOUNTER — TELEPHONE (OUTPATIENT)
Dept: UROLOGY | Facility: CLINIC | Age: 77
End: 2022-03-16
Payer: MEDICARE

## 2022-03-16 ENCOUNTER — TELEPHONE (OUTPATIENT)
Dept: FAMILY MEDICINE | Facility: CLINIC | Age: 77
End: 2022-03-16
Payer: MEDICARE

## 2022-03-16 NOTE — TELEPHONE ENCOUNTER
----- Message from LONI Torres MD sent at 3/15/2022  4:42 PM CDT -----  Psa elevated; recommend follow up with urologist  Sugar up somewhat but overall labs stable

## 2022-03-16 NOTE — TELEPHONE ENCOUNTER
"----- Message from Na Garber sent at 3/16/2022 11:33 AM CDT -----  Regarding: Appointment  "Type:  Patient Call Back    Who Called:PEYMAN OMER    What is the reqeust in detail:Pt requesting call back to schedule an appointment. Please advise    Can the clinic reply by [facility]?no    Best Call Back Number:[phone]      Additional Information:Elevated PSA            "

## 2022-03-22 ENCOUNTER — PATIENT MESSAGE (OUTPATIENT)
Dept: FAMILY MEDICINE | Facility: CLINIC | Age: 77
End: 2022-03-22
Payer: MEDICARE

## 2022-03-28 DIAGNOSIS — I15.2 HYPERTENSION ASSOCIATED WITH DIABETES: ICD-10-CM

## 2022-03-28 DIAGNOSIS — K21.9 GASTROESOPHAGEAL REFLUX DISEASE: ICD-10-CM

## 2022-03-28 DIAGNOSIS — E78.5 TYPE 2 DIABETES MELLITUS WITH HYPERLIPIDEMIA: ICD-10-CM

## 2022-03-28 DIAGNOSIS — E11.59 HYPERTENSION ASSOCIATED WITH DIABETES: ICD-10-CM

## 2022-03-28 DIAGNOSIS — E11.69 TYPE 2 DIABETES MELLITUS WITH HYPERLIPIDEMIA: ICD-10-CM

## 2022-03-28 DIAGNOSIS — I10 ESSENTIAL HYPERTENSION: ICD-10-CM

## 2022-03-28 LAB
OB PNL STL: NEGATIVE
WBC #/AREA STL HPF: NORMAL /[HPF]

## 2022-03-28 PROCEDURE — 82272 OCCULT BLD FECES 1-3 TESTS: CPT | Performed by: NURSE PRACTITIONER

## 2022-03-28 PROCEDURE — 89055 LEUKOCYTE ASSESSMENT FECAL: CPT | Performed by: NURSE PRACTITIONER

## 2022-03-28 PROCEDURE — 87209 SMEAR COMPLEX STAIN: CPT | Performed by: NURSE PRACTITIONER

## 2022-03-28 PROCEDURE — 87177 OVA AND PARASITES SMEARS: CPT | Performed by: NURSE PRACTITIONER

## 2022-03-28 PROCEDURE — 87045 FECES CULTURE AEROBIC BACT: CPT | Performed by: NURSE PRACTITIONER

## 2022-03-28 PROCEDURE — 83993 ASSAY FOR CALPROTECTIN FECAL: CPT | Performed by: NURSE PRACTITIONER

## 2022-03-28 PROCEDURE — 87329 GIARDIA AG IA: CPT | Performed by: NURSE PRACTITIONER

## 2022-03-28 PROCEDURE — 87427 SHIGA-LIKE TOXIN AG IA: CPT | Mod: 59 | Performed by: NURSE PRACTITIONER

## 2022-03-28 PROCEDURE — 87046 STOOL CULTR AEROBIC BACT EA: CPT | Mod: 59 | Performed by: NURSE PRACTITIONER

## 2022-03-28 NOTE — TELEPHONE ENCOUNTER
No new care gaps identified.  Powered by [employer]. Reference number: [id number].   3/28/2022 3:23:09 PM CDT

## 2022-03-29 LAB
CRYPTOSP AG STL QL IA: NEGATIVE
E COLI SXT1 STL QL IA: NEGATIVE
E COLI SXT2 STL QL IA: NEGATIVE
G LAMBLIA AG STL QL IA: NEGATIVE

## 2022-03-29 RX ORDER — GLIPIZIDE 10 MG/1
TABLET ORAL
Qty: 90 TABLET | Refills: 1 | Status: SHIPPED | OUTPATIENT
Start: 2022-03-29 | End: 2022-09-12 | Stop reason: SDUPTHER

## 2022-03-29 RX ORDER — LOSARTAN POTASSIUM 100 MG/1
TABLET ORAL
Qty: 90 TABLET | Refills: 3 | Status: SHIPPED | OUTPATIENT
Start: 2022-03-29 | End: 2023-02-24

## 2022-03-29 RX ORDER — BLOOD-GLUCOSE METER
EACH MISCELLANEOUS
Qty: 1 EACH | Refills: 0 | Status: SHIPPED | OUTPATIENT
Start: 2022-03-29

## 2022-03-29 RX ORDER — OMEPRAZOLE 40 MG/1
CAPSULE, DELAYED RELEASE ORAL
Qty: 90 CAPSULE | Refills: 3 | Status: SHIPPED | OUTPATIENT
Start: 2022-03-29 | End: 2023-02-24

## 2022-03-29 NOTE — TELEPHONE ENCOUNTER
Refill Authorization Note   Tres Peace  is requesting a refill authorization.  Brief Assessment and Rationale for Refill:  Approve     Medication Therapy Plan:       Medication Reconciliation Completed: No   Comments:   --->Care Gap information included below if applicable.       Requested Prescriptions   Pending Prescriptions Disp Refills    TRUE METRIX LEVEL 1 Soln [Pharmacy Med Name: TRUE METRIX CONTROL SOLUTION LEVEL 1 Low  Solution] 1 each 0     Sig: USE AS DIRECTED       Endocrinology: Diabetes - Supplies Passed - 3/29/2022  4:50 PM        Passed - Patient is at least 18 years old        Passed - Valid encounter within last 15 months     Recent Visits  Date Type Provider Dept   03/10/22 Office Visit LONI Torres MD [facility] Family Medicine   09/09/21 Office Visit LONI Torres MD [facility]   03/05/21 Office Visit LONI Torres MD [facility]   06/09/20 Office Visit LONI Torres MD [facility]   Showing recent visits within past 720 days and meeting all other requirements  Future Appointments  No visits were found meeting these conditions.  Showing future appointments within next 150 days and meeting all other requirements      Future Appointments              In 3 weeks MD Nakul Payne  Urology, [place]    In 5 months LONI Torres MD [place] - [facility] [place]                Passed - Matches previous order       Previous Authorizing Provider: LONI Torres MD (blood glucose control, low (TRUE METRIX LEVEL 1) Soln)  Previous Authorizing Provider: LONI Torres MD (glipiZIDE (GLUCOTROL) 10 MG tablet)  Previous Authorizing Provider: LONI Torres MD (losartan (COZAAR) 100 MG tablet)  Previous Authorizing Provider: LONI Torres MD (omeprazole (PRILOSEC) 40 MG capsule)  Previous Pharmacy: [facility] [address]  Previous Pharmacy: [facility]  Delivery - [facility] [address]   Previous Pharmacy: [facility] [address] [facility]  Previous Pharmacy: [facility] - [facility] [address] [facility]            Passed - No ED/Hospital visits since last PCP visit     Last PCP Visit: 3/10/2022 Last Admission: 2/2/2021 Last ED Visit:           Passed - HBA1C within 1080 days     Lab Results   Component Value Date    HGBA1C 7.6 (H) 03/10/2022    HGBA1C 7.5 (H) 09/09/2021    HGBA1C 7.4 (H) 03/05/2021                glipiZIDE (GLUCOTROL) 10 MG tablet [Pharmacy Med Name: GLIPIZIDE 10 MG Tablet] 90 tablet 1     Sig: TAKE 1 TABLET EVERY DAY       Endocrinology:  Diabetes - Sulfonylureas Passed - 3/29/2022  4:50 PM        Passed - Patient is at least 18 years old        Passed - Valid encounter within last 15 months     Recent Visits  Date Type Provider Dept   03/10/22 Office Visit LONI Torres MD [facility] Family Medicine   09/09/21 Office Visit LONI Torres MD [facility] Medicine   03/05/21 Office Visit LONI Torres MD [facility] Family Medicine   06/09/20 Office Visit LONI Torres MD [facility] Medicine   Showing recent visits within past 720 days and meeting all other requirements  Future Appointments  No visits were found meeting these conditions.  Showing future appointments within next 150 days and meeting all other requirements      Future Appointments              [facility]    [facility]                Passed - Matches previous order       Previous Authorizing Provider: LONI Torres MD (blood glucose control, low (TRUE METRIX LEVEL 1) Soln)  Previous Authorizing Provider: LONI Torres MD (glipiZIDE (GLUCOTROL) 10 MG tablet)  Previous Authorizing Provider: LONI Torres MD (losartan (COZAAR) 100 MG tablet)  Previous Authorizing Provider: LONI  Sanjay Torres MD (omeprazole (PRILOSEC) 40 MG capsule)  Previous Pharmacy: [facility] [address]  Previous Pharmacy: [facility] [address]  [facility] [address]  Previous Pharmacy: [facility] [address]            Passed - No ED/Hospital visits since last PCP visit     Last PCP Visit: 3/10/2022 Last Admission: 2/2/2021 Last ED Visit:           Passed - HBA1C within 180 days     Lab Results   Component Value Date    HGBA1C 7.6 (H) 03/10/2022    HGBA1C 7.5 (H) 09/09/2021    HGBA1C 7.4 (H) 03/05/2021              Passed - Cr is 1.39 or below and within 360 days     Lab Results   Component Value Date    CREATININE 0.8 03/10/2022    CREATININE 0.9 02/11/2022    CREATININE 0.9 09/09/2021              Passed - eGFR is 59 or above and within 360 days     Lab Results   Component Value Date    EGFRNONAA >60.0 03/10/2022    EGFRNONAA >60.0 02/11/2022    EGFRNONAA >60.0 09/09/2021                  losartan (COZAAR) 100 MG tablet [Pharmacy Med Name: LOSARTAN POTASSIUM 100 MG Tablet] 90 tablet 3     Sig: TAKE 1 TABLET EVERY DAY       Cardiovascular:  Angiotensin Receptor Blockers Passed - 3/29/2022  4:50 PM        Passed - Patient is at least 18 years old        Passed - Last BP in normal range within 360 days     BP Readings from Last 3 Encounters:   03/10/22 124/60   09/09/21 130/78   03/05/21 130/80               Passed - Valid encounter within last 15 months     Recent Visits  Date Type Provider Dept   03/10/22 Office Visit LONI Torres MD [facility] Family Medicine   09/09/21 Office Visit LONI Torres MD [facility] Medicine   03/05/21 Office Visit LONI Torres MD [facility] Medicine   06/09/20 Office Visit LONI Torres MD [facility] Family Medicine   Showing recent visits within past 720 days and  meeting all other requirements  Future Appointments  No visits were found meeting these conditions.  Showing future appointments within next 150 days and meeting all other requirements      Future Appointments              In 3 weeks [facility] [place] [facility]    In 5 months [facility]                Passed - Matches previous order       Previous Authorizing Provider: LONI Torres MD (blood glucose control, low (TRUE METRIX LEVEL 1) Soln)  Previous Authorizing Provider: LONI Torres MD (glipiZIDE (GLUCOTROL) 10 MG tablet)  Previous Authorizing Provider: LONI Torres MD (losartan (COZAAR) 100 MG tablet)  Previous Authorizing Provider: LONI Torres MD (omeprazole (PRILOSEC) 40 MG capsule)  Previous Pharmacy: [facility] [address]  Previous Pharmacy: [facility] [address]  Previous Pharmacy: [facility] [address]  [facility] [address]            Passed - No ED/Hospital visits since last PCP visit     Last PCP Visit: 3/10/2022 Last Admission: 2/2/2021 Last ED Visit:           Passed - Cr is 1.39 or below and within 360 days     Lab Results   Component Value Date    CREATININE 0.8 03/10/2022    CREATININE 0.9 02/11/2022    CREATININE 0.9 09/09/2021              Passed - K is 5.2 or below and within 360 days     Potassium   Date Value Ref Range Status   03/10/2022 4.3 3.5 - 5.1 mmol/L Final   02/11/2022 4.2 3.5 - 5.1 mmol/L Final   09/09/2021 4.9 3.5 - 5.1 mmol/L Final              Passed - eGFR within 360 days     Lab Results   Component Value Date    EGFRNONAA >60.0 03/10/2022    EGFRNONAA >60.0 02/11/2022    EGFRNONAA >60.0 09/09/2021                  omeprazole (PRILOSEC) 40 MG capsule [Pharmacy Med Name:  OMEPRAZOLE 40 MG Capsule Delayed Release] 90 capsule 3     Sig: TAKE 1 CAPSULE EVERY MORNING       Gastroenterology: Proton Pump Inhibitors Passed - 3/29/2022  4:50 PM        Passed - Patient is at least 18 years old        Passed - Osteoporosis is not on problem list        Passed - Plavix is not on active medication list        Passed - An appropriate indication is on the problem list        Passed - Valid encounter within last 15 months     Recent Visits  Date Type Provider Dept   03/10/22 Office Visit LONI Torres MD [facility] Family Medicine   09/09/21 Office Visit LONI Torres MD [facility]   03/05/21 Office Visit LONI Torres MD [facility]   06/09/20 Office Visit LONI Torres MD [facility]   Showing recent visits within past 720 days and meeting all other requirements  Future Appointments  No visits were found meeting these conditions.  Showing future appointments within next 150 days and meeting all other requirements      Future Appointments              In 3 weeks LON[facility]artz [facility]    In 5 months [facility]                Passed - Matches previous order       Previous Authorizing Provider: LONI Torres MD (blood glucose control, low (TRUE METRIX LEVEL 1) Soln)  Previous Authorizing Provider: LONI Torres MD (glipiZIDE (GLUCOTROL) 10 MG tablet)  Previous Authorizing Provider: LONI Torres MD (losartan (COZAAR) 100 MG tablet)  Previous Authorizing Provider: LONI Torres MD (omeprazole (PRILOSEC) 40 MG capsule)  Previous Pharmacy: [facility] [place] [address] [address]  Previous Pharmacy: [facility] [place] [address] [address]  Previous Pharmacy: [facility] [place] [address] [address]  Previous Pharmacy: [facility] [place] [address]  [address]            Passed - No ED/Hospital visits since last PCP visit     Last PCP Visit: 3/10/2022 Last Admission: 2/2/2021 Last ED Visit:               Appointments  past 12m or future 3m with PCP    Date Provider   Last Visit   3/10/2022 LONI Torres MD   Next Visit   9/12/2022 LONI Torres MD   ED visits in past 90 days: 0     Note composed:4:53 PM 03/29/2022

## 2022-03-30 LAB — O+P STL MICRO: NORMAL

## 2022-03-31 ENCOUNTER — ANESTHESIA EVENT (OUTPATIENT)
Dept: ENDOSCOPY | Facility: HOSPITAL | Age: 77
End: 2022-03-31
Payer: MEDICARE

## 2022-03-31 ENCOUNTER — ANESTHESIA (OUTPATIENT)
Dept: ENDOSCOPY | Facility: HOSPITAL | Age: 77
End: 2022-03-31
Payer: MEDICARE

## 2022-03-31 ENCOUNTER — HOSPITAL ENCOUNTER (OUTPATIENT)
Facility: HOSPITAL | Age: 77
Discharge: HOME | End: 2022-03-31
Attending: INTERNAL MEDICINE | Admitting: INTERNAL MEDICINE
Payer: MEDICARE

## 2022-03-31 VITALS
SYSTOLIC BLOOD PRESSURE: 107 MMHG | HEIGHT: 70 IN | TEMPERATURE: 98 F | RESPIRATION RATE: 17 BRPM | BODY MASS INDEX: 25.2 KG/M2 | HEART RATE: 60 BPM | OXYGEN SATURATION: 98 % | WEIGHT: 176 LBS | DIASTOLIC BLOOD PRESSURE: 59 MMHG

## 2022-03-31 DIAGNOSIS — R19.7 DIARRHEA: ICD-10-CM

## 2022-03-31 PROBLEM — K92.1 BLOOD IN STOOL: Status: ACTIVE | Noted: 2022-03-31

## 2022-03-31 LAB
BACTERIA STL CULT: NORMAL
CALPROTECTIN STL-MCNT: 27.5 MCG/G
GLUCOSE SERPL-MCNC: 87 MG/DL (ref 70–110)

## 2022-03-31 PROCEDURE — 45380 COLONOSCOPY AND BIOPSY: CPT | Mod: PO | Performed by: INTERNAL MEDICINE

## 2022-03-31 PROCEDURE — 45380 PR COLONOSCOPY,BIOPSY: ICD-10-PCS | Mod: ,,, | Performed by: INTERNAL MEDICINE

## 2022-03-31 PROCEDURE — 87177 OVA AND PARASITES SMEARS: CPT | Performed by: INTERNAL MEDICINE

## 2022-03-31 PROCEDURE — D9220A PRA ANESTHESIA: Mod: CRNA,,, | Performed by: NURSE ANESTHETIST, CERTIFIED REGISTERED

## 2022-03-31 PROCEDURE — 37000008 HC ANESTHESIA 1ST 15 MINUTES: Mod: PO | Performed by: INTERNAL MEDICINE

## 2022-03-31 PROCEDURE — 88305 TISSUE EXAM BY PATHOLOGIST: CPT | Mod: 59 | Performed by: PATHOLOGY

## 2022-03-31 PROCEDURE — 87209 SMEAR COMPLEX STAIN: CPT | Performed by: INTERNAL MEDICINE

## 2022-03-31 PROCEDURE — 87045 FECES CULTURE AEROBIC BACT: CPT | Performed by: INTERNAL MEDICINE

## 2022-03-31 PROCEDURE — 87449 NOS EACH ORGANISM AG IA: CPT | Performed by: INTERNAL MEDICINE

## 2022-03-31 PROCEDURE — 63600175 PHARM REV CODE 636 W HCPCS: Mod: PO | Performed by: NURSE ANESTHETIST, CERTIFIED REGISTERED

## 2022-03-31 PROCEDURE — D9220A PRA ANESTHESIA: Mod: ANES,,, | Performed by: ANESTHESIOLOGY

## 2022-03-31 PROCEDURE — 82962 GLUCOSE BLOOD TEST: CPT | Mod: PO | Performed by: INTERNAL MEDICINE

## 2022-03-31 PROCEDURE — 37000009 HC ANESTHESIA EA ADD 15 MINS: Mod: PO | Performed by: INTERNAL MEDICINE

## 2022-03-31 PROCEDURE — 25000003 PHARM REV CODE 250: Mod: PO | Performed by: NURSE ANESTHETIST, CERTIFIED REGISTERED

## 2022-03-31 PROCEDURE — 88305 TISSUE EXAM BY PATHOLOGIST: ICD-10-PCS | Mod: 26,,, | Performed by: PATHOLOGY

## 2022-03-31 PROCEDURE — 87427 SHIGA-LIKE TOXIN AG IA: CPT | Mod: 59 | Performed by: INTERNAL MEDICINE

## 2022-03-31 PROCEDURE — 87046 STOOL CULTR AEROBIC BACT EA: CPT | Mod: 59 | Performed by: INTERNAL MEDICINE

## 2022-03-31 PROCEDURE — 27201012 HC FORCEPS, HOT/COLD, DISP: Mod: PO | Performed by: INTERNAL MEDICINE

## 2022-03-31 PROCEDURE — 63600175 PHARM REV CODE 636 W HCPCS: Mod: PO | Performed by: INTERNAL MEDICINE

## 2022-03-31 PROCEDURE — D9220A PRA ANESTHESIA: ICD-10-PCS | Mod: CRNA,,, | Performed by: NURSE ANESTHETIST, CERTIFIED REGISTERED

## 2022-03-31 PROCEDURE — 88305 TISSUE EXAM BY PATHOLOGIST: CPT | Mod: 26,,, | Performed by: PATHOLOGY

## 2022-03-31 PROCEDURE — D9220A PRA ANESTHESIA: ICD-10-PCS | Mod: ANES,,, | Performed by: ANESTHESIOLOGY

## 2022-03-31 PROCEDURE — 45380 COLONOSCOPY AND BIOPSY: CPT | Mod: ,,, | Performed by: INTERNAL MEDICINE

## 2022-03-31 RX ORDER — LIDOCAINE HYDROCHLORIDE 20 MG/ML
INJECTION INTRAVENOUS
Status: DISCONTINUED | OUTPATIENT
Start: 2022-03-31 | End: 2022-03-31

## 2022-03-31 RX ORDER — METRONIDAZOLE 500 MG/1
500 TABLET ORAL
Qty: 21 TABLET | Refills: 0 | Status: SHIPPED | OUTPATIENT
Start: 2022-03-31 | End: 2022-04-07

## 2022-03-31 RX ORDER — BALSALAZIDE DISODIUM 750 MG/1
2250 CAPSULE ORAL 2 TIMES DAILY
Qty: 120 CAPSULE | Refills: 5 | Status: SHIPPED | OUTPATIENT
Start: 2022-03-31 | End: 2022-09-12

## 2022-03-31 RX ORDER — PROPOFOL 10 MG/ML
VIAL (ML) INTRAVENOUS CONTINUOUS PRN
Status: DISCONTINUED | OUTPATIENT
Start: 2022-03-31 | End: 2022-03-31

## 2022-03-31 RX ORDER — SODIUM CHLORIDE, SODIUM LACTATE, POTASSIUM CHLORIDE, CALCIUM CHLORIDE 600; 310; 30; 20 MG/100ML; MG/100ML; MG/100ML; MG/100ML
INJECTION, SOLUTION INTRAVENOUS CONTINUOUS
Status: DISCONTINUED | OUTPATIENT
Start: 2022-03-31 | End: 2022-03-31 | Stop reason: HOSPADM

## 2022-03-31 RX ORDER — PROPOFOL 10 MG/ML
VIAL (ML) INTRAVENOUS
Status: DISCONTINUED | OUTPATIENT
Start: 2022-03-31 | End: 2022-03-31

## 2022-03-31 RX ORDER — SODIUM CHLORIDE 0.9 % (FLUSH) 0.9 %
10 SYRINGE (ML) INJECTION
Status: DISCONTINUED | OUTPATIENT
Start: 2022-03-31 | End: 2022-03-31 | Stop reason: HOSPADM

## 2022-03-31 RX ADMIN — SODIUM CHLORIDE, SODIUM LACTATE, POTASSIUM CHLORIDE, AND CALCIUM CHLORIDE: .6; .31; .03; .02 INJECTION, SOLUTION INTRAVENOUS at 10:03

## 2022-03-31 RX ADMIN — PROPOFOL 100 MG: 10 INJECTION, EMULSION INTRAVENOUS at 10:03

## 2022-03-31 RX ADMIN — LIDOCAINE HYDROCHLORIDE 100 MG: 20 INJECTION, SOLUTION INTRAVENOUS at 10:03

## 2022-03-31 RX ADMIN — PROPOFOL 150 MCG/KG/MIN: 10 INJECTION, EMULSION INTRAVENOUS at 10:03

## 2022-03-31 NOTE — H&P
History & Physical - Short Stay  Gastroenterology      SUBJECTIVE:     Procedure: Colonoscopy    Chief Complaint/Indication for Procedure: Diarrhea.  Blood in stool.    History of Present Illness:    Patient presents for colonoscopy, to evaluate the above.  See recent GI OV note:  Office Visit   2/11/2022  [facility]     Paz Gomez NP    Gastroenterology  Blood in stool +4 more    Dx  Rectal Bleeding; Referred by Aaareferral Self    Reason for Visit     Progress Notes  Paz Gomez NP (Nurse Practitioner)   Gastroenterology  Subjective:       Patient ID: Tres Peace is a 76 y.o. male, Body mass index is 25.4 kg/m².     Chief Complaint: Rectal Bleeding        Patient is new to me. Established patient of Dr. Carvalho.      GI Problem  The primary symptoms include diarrhea and hematochezia (Chief complaint). Primary symptoms do not include fever, weight loss, fatigue, abdominal pain, nausea, vomiting, melena, hematemesis, jaundice, dysuria or rash.   The diarrhea began more than 1 week ago (Started ~ two weeks ago). The diarrhea is watery and bloody. The diarrhea occurs 2 to 4 times per day (does not wake him at night; associated with fecal urgency). Risk factors: denies recent antibiotics, hospitalization, or foreign travel.   The hematochezia began more than 1 week ago (Started ~ two weeks ago; gradually improving). The hematochezia has occurred 2 to 5 times per day (reports small amount of bright red blood in stool; denies bleeding in between bowel movements; reports he decreased his alcohol consumption and this has improved rectal bleeding). The hematochezia is a recurrent problem.   The illness does not include chills, anorexia, dysphagia, odynophagia, bloating or constipation. Associated symptoms comments: Associated symptoms include flatus. Significant associated medical issues include inflammatory bowel disease (Hx of UC- took Lialda and Apriso in the past; not taking any  medications currently), GERD (Hx of GERD- well controlled taking Omeprazole) and hemorrhoids. Associated medical issues do not include gallstones, liver disease, alcohol abuse, PUD, gastric bypass, bowel resection, irritable bowel syndrome or diverticulitis.      Review of Systems   Constitutional: Negative for appetite change, chills, fatigue, fever, unexpected weight change and weight loss.   HENT: Negative for trouble swallowing.    Respiratory: Negative for cough and shortness of breath.    Cardiovascular: Negative for chest pain.   Gastrointestinal: Positive for anal bleeding, blood in stool, diarrhea and hematochezia (Chief complaint). Negative for abdominal distention, abdominal pain, anorexia, bloating, constipation, dysphagia, hematemesis, jaundice, melena, nausea, rectal pain and vomiting.     Assessment:       1. Blood in stool    2. Diarrhea, unspecified type    3. History of ulcerative colitis    4. History of gastroesophageal reflux (GERD)    5. History of colon polyps           Plan:   All diagnoses and orders for this visit:     Blood in stool  - CBC Auto Differential; Future; Expected date: 02/11/2022  - C-reactive protein; Future; Expected date: 02/11/2022  - Comprehensive Metabolic Panel; Future; Expected date: 02/11/2022  - Discussed about different etiologies that can cause rectal bleeding, such as but not limited to diverticulosis, polyps, colon mass, colon inflammation or infection, anal fissure or hemorrhoids.   - Schedule Colonoscopy, discussed procedure with the patient, verbalized understanding   - Avoid/minimize aspirin and anti-inflammatory drugs such as ibuprofen (Advil, Motrin) and naproxen (Aleve and Naprosyn).     Diarrhea, unspecified type & History of ulcerative colitis  - Giardia / Cryptosporidum, EIA; Future; Expected date: 02/11/2022  - Stool Exam-Ova,Cysts,Parasites; Future; Expected date: 02/11/2022  - Stool culture; Future; Expected date: 02/11/2022  - Occult blood x 1,  stool; Future; Expected date: 02/11/2022  - WBC, Stool; Future; Expected date: 02/11/2022  - Calprotectin, Stool; Future; Expected date: 02/11/2022  - Clostridium difficile EIA; Future; Expected date: 02/11/2022  - Recommended increase fiber in diet, especially soluble fiber since this can help bulk up the stool consistency and may help to slow down how fast the stool goes through the colon and can prevent diarrhea  - Schedule Colonoscopy     History of gastroesophageal reflux (GERD)              - Continue Omeprazole 40 mg once daily              - Take PPI in the morning 30 minutes before breakfast              - Recommend to avoid large meals, avoid eating within 3 hours of bedtime, elevate head of bed if nocturnal symptoms are present, smoking cessation (if current smoker), & weight loss (if overweight).               - Recommend minimize/avoid high-fat foods, chocolate, caffeine, citrus, alcohol, & tomato products.              - Advised to avoid/limit use of NSAID's, since they can cause GI upset, bleeding, and/or ulcers. If needed, take with food.      History of colon polyps              - Schedule Colonoscopy               Latest Reference Range & Units 06/02/20 08:11 03/05/21 10:59 09/09/21 10:03 02/11/22 08:44   Hemoglobin 14.0 - 18.0 g/dL 14.4 13.6 (L) 13.8 (L) 14.1   Hematocrit 40.0 - 54.0 % 46.7 43.1 42.5 44.5   MCV 82 - 98 fL 96 92 91 93   (L): Data is abnormally low       Latest Reference Range & Units 03/28/22 10:04   Giardia Antigen - EIA Negative  Negative   Cryptosporidium Antigen Negative  Negative   Occult Blood Negative  Negative   Stool Exam-Ova,Cysts,Parasites  FINAL 03/30/2022 1706 [1]   Stool WBC No neutrophils seen  No neutrophils seen   [1] SOURCE: STOOL  OVA AND PARASITE, MICROSCOPY, F                        FINAL    No parasites seen.   Cryptosporidium, Cyclospora, and microsporidia are not   readily detected by this method.   Single negative specimen does not rule out   parasitic  infection.      See last Colonoscopy 2/2/2021:  Indications:         High risk colon cancer surveillance: Personal                        history of colonic polyps, High risk colon cancer                        surveillance: Hx of Ulcerative pancolitis of 8 (or                        more) years duration, Last colonoscopy: June 2016   Comorbidities        Type 2 diabetes mellitus, Hyperlipidemia   Impression:          - Non-bleeding internal hemorrhoids.                        - Diverticulosis in the sigmoid colon.                        - Pseudopolyps in the entire examined colon.                        - Scarring (from previous hx of ulcerative colitis)                        in the entire examined colon. Biopsied.                        - The examined portion of the ileum was normal.   Recommendation:      - Discharge patient to home.                        - Await pathology results.                        - High fiber diet.                        - Use fiber, for example Citrucel, Fibercon, Konsyl                        or Metamucil.                        - Repeat colonoscopy in 5 years for surveillance.                        - Continue present medications.                        - Patient has a contact number available for                        emergencies. The signs and symptoms of potential                        delayed complications were discussed with the                        patient. Return to normal activities tomorrow.                        Written discharge instructions were provided to the                        patient.                        - Return to normal activities tomorrow.   Arnoldo Carvalho MD   2/2/2021       1. Random cecum, biopsy:  - Fragments of benign lymphoid polyp in a background of unremarkable colonic mucosa  - Negative for dysplasia or malignancy  2. Random ascending colon, biopsy:  - Fragments of benign lymphoid polyp in a background of unremarkable colonic mucosa  -  Negative for dysplasia or malignancy  3. Random transverse colon, biopsy:  - Unremarkable colonic mucosa  - Negative for dysplasia or malignancy  4. Random descending colon, biopsy:  - Unremarkable colonic mucosa  - Negative for dysplasia or malignancy  5. Random sigmoid colon, biopsy:  - Unremarkable colonic mucosa  - Negative for dysplasia or malignancy  6. Random rectum, biopsy:  - Fragments of benign lymphoid polyp in a background of unremarkable colonic mucosa  - Negative for dysplasia or malignancy        Wt Readings from Last 20 Encounters:   03/30/22 79.8 kg (176 lb)   03/10/22 79.4 kg (175 lb 0.7 oz)   02/11/22 80.3 kg (177 lb 0.5 oz)   10/05/21 81.6 kg (180 lb)   09/09/21 82 kg (180 lb 12.4 oz)   03/05/21 82 kg (180 lb 12.4 oz)   01/29/21 80.7 kg (178 lb)   06/09/20 82.5 kg (181 lb 14.1 oz)   12/06/19 78.7 kg (173 lb 8 oz)   10/14/19 79.4 kg (175 lb)   07/18/19 80.7 kg (178 lb)   06/26/19 80.1 kg (176 lb 9.4 oz)   06/05/19 81.1 kg (178 lb 12.7 oz)   06/03/19 81.1 kg (178 lb 12.7 oz)   04/03/19 81 kg (178 lb 9.2 oz)   02/21/19 79 kg (174 lb 2.6 oz)   02/07/19 79.9 kg (176 lb 2.4 oz)   01/10/19 78.2 kg (172 lb 6.4 oz)   12/03/18 80.3 kg (177 lb 0.5 oz)   11/20/18 79.7 kg (175 lb 11.3 oz)         PTA Medications   Medication Sig    atorvastatin (LIPITOR) 40 MG tablet TAKE 1 TABLET EVERY DAY    fluticasone furoate-vilanteroL (BREO ELLIPTA) 100-25 mcg/dose diskus inhaler INHALE 1 PUFF BY INHALATION ROUTE ONCE DAILY AT THE SAME TIME EACH DAY    glipiZIDE (GLUCOTROL) 10 MG tablet TAKE 1 TABLET EVERY DAY    LANTUS SOLOSTAR U-100 INSULIN glargine 100 units/mL (3mL) SubQ pen INJECT 18 UNITS INTO THE SKIN ONCE DAILY.    losartan (COZAAR) 100 MG tablet TAKE 1 TABLET EVERY DAY    omeprazole (PRILOSEC) 40 MG capsule TAKE 1 CAPSULE EVERY MORNING    alcohol swabs PadM Apply 1 each topically as needed.    aspirin 81 MG Chew Take 1 tablet (81 mg total) by mouth once daily.    azithromycin (Z-THAIS) 250 MG tablet Take  "2 tablets by mouth on day 1; Take 1 tablet by mouth on days 2-5    blood sugar diagnostic (ACCU-CHEK CON PLUS TEST STRP) Strp Use to test blood glucose one (1) times daily as directed with insurance preferred meter and supplies    blood-glucose meter (TRUE METRIX GLUCOSE METER) Misc Use to test blood glucose one (1) times daily as directed with insurance preferred meter and supplies    lancets Misc Use to test blood glucose one (1) times daily as directed with insurance preferred meter and supplies    pen needle, diabetic (BD ULTRA-FINE EMERSON PEN NEEDLE) 32 gauge x 5/32" Ndle USE DAILY WITH INJECTABLE INSULIN PEN    TRUE METRIX LEVEL 1 Soln USE AS DIRECTED    TRUEPLUS LANCETS 33 gauge Misc        Review of patient's allergies indicates:   Allergen Reactions    Compazine [prochlorperazine edisylate] Swelling     Distortion of facial muscles and severe swelling        Past Medical History:   Diagnosis Date    Basal cell carcinoma of cheek     BCC (basal cell carcinoma of skin)     Dr. Shaggy Ross, mohs surgery    Diabetes mellitus type II     Diabetes with neurologic complications     GERD (gastroesophageal reflux disease)     Hyperlipidemia     Hypertension     Type II or unspecified type diabetes mellitus without mention of complication, not stated as uncontrolled 10/22/2012    Ulcerative colitis, chronic 10/22/2012    Sees Dr. Franco Herndon     Past Surgical History:   Procedure Laterality Date    CIRCUMCISION      COLONOSCOPY N/A 6/23/2016    Procedure: COLONOSCOPY;  Surgeon: Arnoldo Carvalho Jr., MD;  Location: [facility] ENDO;  Service: Endoscopy;  Laterality: N/A;    COLONOSCOPY N/A 2/2/2021    Procedure: COLONOSCOPY;  Surgeon: Arnoldo Carvalho Jr., MD;  Location: [facility] ENDO;  Service: Endoscopy;  Laterality: N/A;    COLONOSCOPY W/ BIOPSIES  12/20/2010    [facility].   Mod to severe inflammation throughout the colon, c/w ulcerative colitis.    COLONOSCOPY W/ BIOPSIES  07/18/2013    [facility].   " "Post-ulcerative colitis scars in the entire colon.  Pseudopolyps from the descending to the ascending colon.  Sigmoid diverticulosis.  Internal hemorrhoids.    COLONOSCOPY W/ POLYPECTOMY  05/03/2012    [facility].   One 5 mm polyp in the transverse colon.  JUVENILE POLYP.  Sigmoid diverticulosis.  No active inflammation. but pseudopolyps and mucosal scarring.      ESOPHAGOGASTRODUODENOSCOPY  12/20/2010    [facility].   Normal esophagua.  Granular mucosa in the stomach.  Gastritis.  4 mm duodenal bulb ulcer.      ESOPHAGOGASTRODUODENOSCOPY  05/03/2012    [facility].   Grade A reflux esophagitis.  Small hiatal hernia.  Gastritis.  4 mm duodenal bulb ulcer.  8 mm duod ulcer 2nd part. Duod stenosis 1st part duod.    ESOPHAGOGASTRODUODENOSCOPY  07/18/2013    [facility].   Reflux esophagitis. Gastritis/antritis. CLOtest negative.  Erythematous mucosa was found in the duodenal bulb.  Duodenal diverticulum.      SKIN BIOPSY      skin CA removal    TONSILLECTOMY      VASECTOMY       Family History   Problem Relation Age of Onset    Cancer Mother 84        breast ca    Stroke Father     Heart disease Brother     Heart disease Sister     Melanoma Neg Hx      Social History     Tobacco Use    Smoking status: Never Smoker    Smokeless tobacco: Never Used   Substance Use Topics    Alcohol use: Yes     Comment: 1 drink every other day    Drug use: No         OBJECTIVE:     Vital Signs (Most Recent)  Temp: 98.2 °F (36.8 °C) (03/31/22 0958)  Pulse: 60 (03/31/22 0958)  Resp: 16 (03/31/22 0958)  BP: (!) 161/89 (03/31/22 0958)  SpO2: 96 % (03/31/22 0958)    Physical Exam:  :Ht: 5' 10" (177.8 cm)   Wt: 79.8 kg (176 lb)   BMI: 25.25 kg/m²                                                          GENERAL:  Comfortable, in no acute distress.                                 HEENT EXAM:  Nonicteric.  No adenopathy.  Oropharynx is clear.               NECK:  Supple.                                                               LUNGS:  " Clear.                                                               CARDIAC:  Regular rate and rhythm.  S1, S2.  No murmur.                      ABDOMEN:  Soft, positive bowel sounds, nontender.  No hepatosplenomegaly or masses.  No rebound or guarding.                                             EXTREMITIES:  No edema.     MENTAL STATUS:  Alert and oriented.    ASSESSMENT/PLAN:     Assessment: Diarrhea.  Blood in stool.    Plan: Colonoscopy    Anesthesia Plan:   MAC / General Anaesthesia    ASA Grade: ASA 2 - Patient with mild systemic disease with no functional limitations    MALLAMPATI SCORE: I (soft palate, uvula, fauces, and tonsillar pillars visible)

## 2022-03-31 NOTE — ANESTHESIA PREPROCEDURE EVALUATION
03/31/2022  Tres Peace is a 76 y.o., male.      Pre-op Assessment    I have reviewed the Patient Summary Reports.     I have reviewed the Nursing Notes. I have reviewed the NPO Status.   I have reviewed the Medications.     Review of Systems  Anesthesia Hx:  No problems with previous Anesthesia    Social:  Non-Smoker    Hematology/Oncology:         -- Cancer in past history (BCCA):   Cardiovascular:   Hypertension, well controlled hyperlipidemia    Pulmonary:  Pulmonary Normal    Renal/:  Renal/ Normal     Hepatic/GI:   PUD, GERD Ulcerative colitis   Neurological:  Neurology Normal    Endocrine:   Diabetes, well controlled, type 2        Physical Exam  General: Well nourished, Cooperative, Alert and Oriented    Airway:  Mallampati: I   Mouth Opening: Normal  Neck ROM: Normal ROM        Anesthesia Plan  Type of Anesthesia, risks & benefits discussed:    Anesthesia Type: Gen ETT, Gen Supraglottic Airway, Gen Natural Airway, MAC  Intra-op Monitoring Plan: Standard ASA Monitors  Post Op Pain Control Plan: multimodal analgesia  Induction:  IV  Airway Plan: Direct, Video and Fiberoptic, Post-Induction  Informed Consent: Informed consent signed with the Patient and all parties understand the risks and agree with anesthesia plan.  All questions answered.   ASA Score: 3    Ready For Surgery From Anesthesia Perspective.     .

## 2022-03-31 NOTE — [employer] PATIENT INSTRUCTIONS
Discharge Summary/Instructions for after Colonoscopy with   Biopsy/Polypectomy  Tres Peace    Thursday, March 31, 2022  Arnoldo Carvalho MD  RESTRICTIONS ON ACTIVITY:  - Do not drive a car or operate machinery until the day after the procedure.      - The following day: return to full activity including work.  - For  3 days: No heavy lifting, straining or running.  - Diet: You can have solid foods, but no gassy foods (i.e. beans, broccoli,   cabbage, etc).  TREATMENT FOR COMMON SIDE EFFECTS:  - Mild abdominal pain and bloating or excessive gas: rest, eat lightly and   use a heating pad.  SYMPTOMS TO WATCH FOR AND REPORT TO YOUR PHYSICIAN:  1. Severe abdominal pain.  2. Fever within 24 hours after a procedure.  3. A large amount of rectal bleeding. (A small amount of blood from the   rectum is not serious, especially if hemorrhoids are present.  3.  Because air was put into your colon during the procedure, expelling   large amounts of air from your rectum is normal.  4.  You may not have a bowel movement for 1-3 days because of the   colonoscopy prep.  This is normal.  5.  Call immediately if you notice any of the following:   Chills and/or fever over 101   Persistent vomiting   Severe abdominal pain, other than gas cramps   Severe chest pain   Black, tarry stools   Any bleeding - exceeding one tablespoon  Your doctor recommends these additional instructions:  Eat a low fat diet, eat a gluten free diet and eat a lactose free diet.   Continue your present medications.   Take Colazal (balsalazide) 3 capsules by mouth twice a day.   Return to GI clinic in 6-8 weeks.  None  If you have any questions or problems, please call your physician.  EMERGENCY PHONE NUMBER: [phone]  LAB RESULTS: Call in two (2) weeks for lab results, [phone]  ___________________________________________  Nurse Signature  ___________________________________________  Patient/Designated Responsible Party Signature  Arnoldo VILLALTA  MD Deven  3/31/2022 11:41:30 AM  This report has been verified and signed electronically.  Dear patient,  As a result of recent federal legislation (The Federal Cures Act), you may   receive lab or pathology results from your procedure in your [facility]   account before your physician is able to contact you. Your physician or   their representative will relay the results to you with their   recommendations at their soonest availability.  Thank you.  [employer]

## 2022-03-31 NOTE — ANESTHESIA POSTPROCEDURE EVALUATION
Anesthesia Post Evaluation    Patient: Tres Peace    Procedure(s) Performed: Procedure(s) (LRB):  COLONOSCOPY (N/A)    Final Anesthesia Type: general      Patient location during evaluation: PACU  Patient participation: Yes- Able to Participate  Level of consciousness: awake and alert and oriented  Post-procedure vital signs: reviewed and stable  Pain management: adequate  Airway patency: patent    PONV status at discharge: No PONV  Anesthetic complications: no      Cardiovascular status: blood pressure returned to baseline and stable  Respiratory status: unassisted and spontaneous ventilation  Hydration status: euvolemic  Follow-up not needed.          Vitals Value Taken Time   /59 03/31/22 1120   Temp 36.5 °C (97.7 °F) 03/31/22 1110   Pulse 60 03/31/22 1120   Resp 17 03/31/22 1120   SpO2 98 % 03/31/22 1120         Event Time   Out of Recovery 12:10:00         Pain/Joce Score: Joce Score: 10 (3/31/2022 11:32 AM)

## 2022-03-31 NOTE — PATIENT INSTRUCTIONS
Recovery After Procedural Sedation (Adult)   You have been given medicine by vein to make you sleep during your surgery. This may have included both a pain medicine and sleeping medicine. Most of the effects have worn off. But you may still have some drowsiness for the next 6 to 8 hours.  Home care  Follow these guidelines when you get home:  For the next 8 hours, you should be watched by a responsible adult. This person should make sure your condition is not getting worse.  Don't drink any alcohol for the next 24 hours.  Don't drive, operate dangerous machinery, or make important business or personal decisions during the next 24 hours.  To prevent injury or falls, use caution when standing and walking for at least 24 hours after your procedure.  Note: Your healthcare provider may tell you not to take any medicine by mouth for pain or sleep in the next 4 hours. These medicines may react with the medicines you were given in the hospital. This could cause a much stronger response than usual.  Follow-up care  Follow up with your healthcare provider if you are not alert and back to your usual level of activity within 12 hours.  When to seek medical advice  Call your healthcare provider right away if any of these occur:  Drowsiness gets worse  Weakness or dizziness gets worse  Repeated vomiting  You can't be awakened  Fever  New rash  [employer] last reviewed this educational content on 9/1/2019  © 0143-4979 [employer] [address] [place] [address], [place] [address]. All rights reserved. This information is not intended as a substitute for professional medical care. Always follow your healthcare professional's instructions       Please follow a low fat, lactose free, gluten free diet.

## 2022-03-31 NOTE — TRANSFER OF CARE
"Anesthesia Transfer of Care Note    Patient: Tres Peace    Procedure(s) Performed: Procedure(s) (LRB):  COLONOSCOPY (N/A)    Patient location: PACU    Anesthesia Type: general    Transport from OR: Transported from OR on room air with adequate spontaneous ventilation    Post pain: adequate analgesia    Post assessment: no apparent anesthetic complications    Post vital signs: stable    Level of consciousness: responds to stimulation    Nausea/Vomiting: no nausea/vomiting    Complications: none    Transfer of care protocol was followed      Last vitals:   Visit Vitals  BP (!) 161/89 (BP Location: Left arm, Patient Position: Lying)   Pulse 60   Temp 36.8 °C (98.2 °F) (Skin)   Resp 16   Ht 5' 10" (1.778 m)   Wt 79.8 kg (176 lb)   SpO2 96%   BMI 25.25 kg/m²     "

## 2022-03-31 NOTE — BRIEF OP NOTE
Discharge Note  Short Stay      SUMMARY     Admit Date: 3/31/2022    Attending Physician: Arnoldo Carvalho Jr., MD     Discharge Physician: Arnoldo Carvalho Jr., MD    Discharge Date: 3/31/2022 11:45 AM    Final Diagnosis: Blood in stool [K92.1]  Diarrhea, unspecified type [R19.7]  History of ulcerative colitis [Z87.19]      Impression:            - Erythematous, friable (with contact bleeding),                          granular and inflamed mucosa in the rectum, in the                          recto-sigmoid colon and in the sigmoid colon.                          Fluid aspiration performed. Biopsied.                          - Left-sided ulcerative colitis.                          - Diverticulosis in the sigmoid colon.                          - Non-bleeding internal hemorrhoids.                          - Scarred mucosa in the descending colon, in the                          transverse colon and in the ascending colon.                          - Pseudopolyps in the descending colon, in the                          transverse colon and in the ascending colon.                          - The examination was otherwise normal.                          - Randomly Biopsied.                          - The examined portion of the ileum was normal.   Recommendation:        - Discharge patient to home.                          - Await pathology results.                          - Low fat diet, gluten free diet and lactose free                          diet.                          - Continue present medications.                          - Use Colazal (balsalazide) 3 capsules PO BID.                          - Call the G.I. clinic in 2 weeks for reports (if                          you haven't heard from us sooner) [phone].                          - Return to GI clinic in 6 weeks.                          See GI note of 6/18/2013:                          Mr. Peace is here to establish in our clinic on                           the [facility], to try to consolidate all his                          physicians here. He is here primarily because of                          his hx of ulcerative colitis. This was dx'd                          12/2010, and has been followed closelly by Dr. Barragan. His last colonoscopy was a year ago,                          5/3/12, without much active disease seen, and Dr. Barragan recommended a repeat at 1 yr hence. He                          was on Lialda, but for financial reasons he                          changed to Apriso. But shortly after the change,                          he developed a rash and stopped the Apriso (about                          2 months ago, April?). The rash resolved. But he                          didn't inform Dr. GILBERT about these events, and is on                          no mesalamine at present. But he reports he is                          asymptomatic. No cramps. No diarrhea. No blood.                          His weight had gotten as low as 148. But now he is                          at 183.                          .                          - Patient has a contact number available for                          emergencies. The signs and symptoms of potential                          delayed complications were discussed with the                          patient. Return to normal activities tomorrow.                          Written discharge instructions were provided to                          the patient.                          - Repeat colonoscopy in 3 years for surveillance.   Arnoldo Carvalho MD   3/31/2022       Disposition: HOME OR SELF CARE    Patient Instructions:   Current Discharge Medication List      START taking these medications    Details   balsalazide (COLAZAL) 750 mg capsule Take 3 capsules (2,250 mg total) by mouth 2 (two) times daily.  Qty: 120 capsule, Refills: 5      metroNIDAZOLE  (FLAGYL) 500 MG tablet Take 1 tablet (500 mg total) by mouth 3 (three) times daily with meals. for 7 days  Qty: 21 tablet, Refills: 0         CONTINUE these medications which have NOT CHANGED    Details   atorvastatin (LIPITOR) 40 MG tablet TAKE 1 TABLET EVERY DAY  Qty: 90 tablet, Refills: 3    Comments: Please inactivate all prior scripts with same name and strength including on holds.  Associated Diagnoses: Type 2 diabetes mellitus with hyperlipidemia      fluticasone furoate-vilanteroL (BREO ELLIPTA) 100-25 mcg/dose diskus inhaler INHALE 1 PUFF BY INHALATION ROUTE ONCE DAILY AT THE SAME TIME EACH DAY  Qty: 1 each, Refills: 2    Comments: Patient needs appointment for additional refills      glipiZIDE (GLUCOTROL) 10 MG tablet TAKE 1 TABLET EVERY DAY  Qty: 90 tablet, Refills: 1    Associated Diagnoses: Type 2 diabetes mellitus with hyperlipidemia; Hypertension associated with diabetes      LANTUS SOLOSTAR U-100 INSULIN glargine 100 units/mL (3mL) SubQ pen INJECT 18 UNITS INTO THE SKIN ONCE DAILY.  Qty: 30 mL, Refills: 1    Comments: Please inactivate all prior scripts with same name and strength including any scripts on hold.  Associated Diagnoses: Type 2 diabetes mellitus with hyperlipidemia      losartan (COZAAR) 100 MG tablet TAKE 1 TABLET EVERY DAY  Qty: 90 tablet, Refills: 3    Associated Diagnoses: Essential hypertension      omeprazole (PRILOSEC) 40 MG capsule TAKE 1 CAPSULE EVERY MORNING  Qty: 90 capsule, Refills: 3    Associated Diagnoses: Gastroesophageal reflux disease      alcohol swabs PadM Apply 1 each topically as needed.  Qty: 200 each, Refills: 3    Comments: Please inactivate all prior scripts with same name and strength including any scripts on hold.  Associated Diagnoses: Type 2 diabetes mellitus with hyperlipidemia      aspirin 81 MG Chew Take 1 tablet (81 mg total) by mouth once daily.    Associated Diagnoses: Hypertension associated with diabetes; Type 2 diabetes mellitus with hyperlipidemia  "     azithromycin (Z-THAIS) 250 MG tablet Take 2 tablets by mouth on day 1; Take 1 tablet by mouth on days 2-5  Qty: 6 tablet, Refills: 0    Associated Diagnoses: Cough productive of yellow sputum      blood sugar diagnostic (ACCU-CHEK CON PLUS TEST STRP) Strp Use to test blood glucose one (1) times daily as directed with insurance preferred meter and supplies  Qty: 100 strip, Refills: 3    Comments: Please inactivate all prior scripts with same name and strength including any scripts on hold.  Associated Diagnoses: Type 2 diabetes mellitus with hyperlipidemia      blood-glucose meter (TRUE METRIX GLUCOSE METER) Misc Use to test blood glucose one (1) times daily as directed with insurance preferred meter and supplies  Qty: 1 each, Refills: 0    Comments: Please inactivate all prior scripts with same name and strength including any scripts on hold.  Associated Diagnoses: Type 2 diabetes mellitus with hyperlipidemia      !! lancets Misc Use to test blood glucose one (1) times daily as directed with insurance preferred meter and supplies  Qty: 100 each, Refills: 3    Comments: Please inactivate all prior scripts with same name and strength including any scripts on hold.  Associated Diagnoses: Type 2 diabetes mellitus with hyperlipidemia      pen needle, diabetic (BD ULTRA-FINE EMEROSN PEN NEEDLE) 32 gauge x 5/32" Ndle USE DAILY WITH INJECTABLE INSULIN PEN  Qty: 100 each, Refills: 3    Comments: Please inactivate all prior scripts with same name and strength including any scripts on hold.  Associated Diagnoses: Type 2 diabetes mellitus with hyperlipidemia      TRUE METRIX LEVEL 1 Soln USE AS DIRECTED  Qty: 1 each, Refills: 0    Associated Diagnoses: Type 2 diabetes mellitus with hyperlipidemia      !! TRUEPLUS LANCETS 33 gauge Misc        !! - Potential duplicate medications found. Please discuss with provider.          Discharge Procedure Orders (must include Diet, Follow-up, Activity)    Follow Up:  Follow up with PCP as " per your routine.  Please follow a low fat, lactose free, gluten free diet.  Activity as tolerated.    No driving day of procedure.

## 2022-04-01 ENCOUNTER — TELEPHONE (OUTPATIENT)
Dept: GASTROENTEROLOGY | Facility: CLINIC | Age: 77
End: 2022-04-01
Payer: MEDICARE

## 2022-04-01 LAB
C DIFF GDH STL QL: NEGATIVE
C DIFF TOX A+B STL QL IA: NEGATIVE
E COLI SXT1 STL QL IA: NEGATIVE
E COLI SXT2 STL QL IA: NEGATIVE

## 2022-04-01 NOTE — TELEPHONE ENCOUNTER
Called and spoke with the patient, patient was set up for his post procedure follow up as recommended by Dr. Carvalho, patient verbalized understanding of this, recall letter for repeat colonoscopy placed in epic.

## 2022-04-01 NOTE — TELEPHONE ENCOUNTER
----- Message from Bob Mcdowell MD sent at 4/1/2022  6:11 AM CDT -----  Tell pt stool evaluation returned negative. Continue recommendations as per CANDACE Gomez and Dr Carvalho.

## 2022-04-04 ENCOUNTER — PATIENT MESSAGE (OUTPATIENT)
Dept: FAMILY MEDICINE | Facility: CLINIC | Age: 77
End: 2022-04-04
Payer: MEDICARE

## 2022-04-04 LAB
BACTERIA STL CULT: NORMAL
O+P STL MICRO: NORMAL

## 2022-04-08 LAB
FINAL PATHOLOGIC DIAGNOSIS: NORMAL
Lab: NORMAL

## 2022-04-20 ENCOUNTER — PATIENT OUTREACH (OUTPATIENT)
Dept: ADMINISTRATIVE | Facility: OTHER | Age: 77
End: 2022-04-20
Payer: MEDICARE

## 2022-04-20 NOTE — PROGRESS NOTES
Health Maintenance Due   Topic Date Due    Shingles Vaccine (1 of 2) Never done    Foot Exam  02/07/2020    Diabetes Urine Screening  06/02/2021    Influenza Vaccine (1) 09/01/2021    Eye Exam  05/12/2022     Updates were requested from care everywhere.  Chart was reviewed for overdue Proactive [facility] Encounters (BRANDI) topics (CRS, Breast Cancer Screening, Eye exam)  Health Maintenance has been updated.  LINKS immunization registry triggered.  Immunizations were reconciled.

## 2022-04-25 ENCOUNTER — OFFICE VISIT (OUTPATIENT)
Dept: UROLOGY | Facility: CLINIC | Age: 77
End: 2022-04-25
Payer: MEDICARE

## 2022-04-25 VITALS — HEIGHT: 70 IN | WEIGHT: 170.44 LBS | BODY MASS INDEX: 24.4 KG/M2

## 2022-04-25 DIAGNOSIS — R97.20 ELEVATED PSA: ICD-10-CM

## 2022-04-25 DIAGNOSIS — N40.0 BENIGN PROSTATIC HYPERPLASIA WITHOUT LOWER URINARY TRACT SYMPTOMS: Primary | ICD-10-CM

## 2022-04-25 PROCEDURE — 1101F PR PT FALLS ASSESS DOC 0-1 FALLS W/OUT INJ PAST YR: ICD-10-PCS | Mod: CPTII,S$GLB,, | Performed by: UROLOGY

## 2022-04-25 PROCEDURE — 99214 OFFICE O/P EST MOD 30 MIN: CPT | Mod: S$GLB,,, | Performed by: UROLOGY

## 2022-04-25 PROCEDURE — 1160F RVW MEDS BY RX/DR IN RCRD: CPT | Mod: CPTII,S$GLB,, | Performed by: UROLOGY

## 2022-04-25 PROCEDURE — 1126F PR PAIN SEVERITY QUANTIFIED, NO PAIN PRESENT: ICD-10-PCS | Mod: CPTII,S$GLB,, | Performed by: UROLOGY

## 2022-04-25 PROCEDURE — 1101F PT FALLS ASSESS-DOCD LE1/YR: CPT | Mod: CPTII,S$GLB,, | Performed by: UROLOGY

## 2022-04-25 PROCEDURE — 3051F HG A1C>EQUAL 7.0%<8.0%: CPT | Mod: CPTII,S$GLB,, | Performed by: UROLOGY

## 2022-04-25 PROCEDURE — 1160F PR REVIEW ALL MEDS BY PRESCRIBER/CLIN PHARMACIST DOCUMENTED: ICD-10-PCS | Mod: CPTII,S$GLB,, | Performed by: UROLOGY

## 2022-04-25 PROCEDURE — 99999 PR PBB SHADOW E&M-EST. PATIENT-LVL III: CPT | Mod: PBBFAC,,, | Performed by: UROLOGY

## 2022-04-25 PROCEDURE — 1126F AMNT PAIN NOTED NONE PRSNT: CPT | Mod: CPTII,S$GLB,, | Performed by: UROLOGY

## 2022-04-25 PROCEDURE — 99999 PR PBB SHADOW E&M-EST. PATIENT-LVL III: ICD-10-PCS | Mod: PBBFAC,,, | Performed by: UROLOGY

## 2022-04-25 PROCEDURE — 3288F PR FALLS RISK ASSESSMENT DOCUMENTED: ICD-10-PCS | Mod: CPTII,S$GLB,, | Performed by: UROLOGY

## 2022-04-25 PROCEDURE — 3288F FALL RISK ASSESSMENT DOCD: CPT | Mod: CPTII,S$GLB,, | Performed by: UROLOGY

## 2022-04-25 PROCEDURE — 1159F MED LIST DOCD IN RCRD: CPT | Mod: CPTII,S$GLB,, | Performed by: UROLOGY

## 2022-04-25 PROCEDURE — 99214 PR OFFICE/OUTPT VISIT, EST, LEVL IV, 30-39 MIN: ICD-10-PCS | Mod: S$GLB,,, | Performed by: UROLOGY

## 2022-04-25 PROCEDURE — 3051F PR MOST RECENT HEMOGLOBIN A1C LEVEL 7.0 - < 8.0%: ICD-10-PCS | Mod: CPTII,S$GLB,, | Performed by: UROLOGY

## 2022-04-25 PROCEDURE — 1159F PR MEDICATION LIST DOCUMENTED IN MEDICAL RECORD: ICD-10-PCS | Mod: CPTII,S$GLB,, | Performed by: UROLOGY

## 2022-04-25 RX ORDER — MONTELUKAST SODIUM 10 MG/1
TABLET ORAL DAILY
COMMUNITY
Start: 2021-12-05 | End: 2022-04-25

## 2022-04-25 NOTE — PROGRESS NOTES
Subjective:       Patient ID: Tres Peace is a 76 y.o. male.    Chief Complaint: Elevated PSA    HPI     76-year-old with elevated PSA.  His most recent PSA is 5.0.  It has been overall stable for the last 3 years.  He has no family history of prostate cancer.  He has no bothersome urinary symptoms.  He denies hematuria and dysuria.  He takes no BPH medications.  We discussed screening PSA and its limitation.  We discussed the possibility of early stage prostate cancer.  We discussed prostate needle biopsy.      Component PSA, Screen   Latest Ref Rng & Units 0.00 - 4.00 ng/mL   3/10/2022 5.0 (H)   9/9/2021 4.1 (H)   6/9/2020 1.4   5/28/2019 4.8 (H)   6/12/2018 2.5   6/16/2017 2.8   3/21/2016 2.1   6/23/2014 1.6       Review of Systems   Constitutional: Negative for fever.   Genitourinary: Negative for dysuria and hematuria.       Objective:      Physical Exam  Vitals reviewed.   Constitutional:       Appearance: He is well-developed.   HENT:      Head: Normocephalic and atraumatic.   Eyes:      Conjunctiva/sclera: Conjunctivae normal.   Cardiovascular:      Rate and Rhythm: Normal rate.   Pulmonary:      Effort: Pulmonary effort is normal.   Genitourinary:     Prostate: Enlarged (40g, s/s/a). Not tender.      Rectum: No mass. Normal anal tone.   Musculoskeletal:         General: Normal range of motion.   Skin:     General: Skin is warm and dry.      Findings: No rash.   Neurological:      Mental Status: He is alert and oriented to person, place, and time.         Assessment:       1. Benign prostatic hyperplasia without lower urinary tract symptoms    2. Elevated PSA        Plan:       Benign prostatic hyperplasia without lower urinary tract symptoms    Elevated PSA      I recommend observation.  Annual follow-up with repeat PSA

## 2022-05-09 ENCOUNTER — PATIENT MESSAGE (OUTPATIENT)
Dept: SMOKING CESSATION | Facility: CLINIC | Age: 77
End: 2022-05-09
Payer: MEDICARE

## 2022-05-31 ENCOUNTER — PATIENT MESSAGE (OUTPATIENT)
Dept: ADMINISTRATIVE | Facility: HOSPITAL | Age: 77
End: 2022-05-31
Payer: MEDICARE

## 2022-06-01 ENCOUNTER — TELEPHONE (OUTPATIENT)
Dept: FAMILY MEDICINE | Facility: CLINIC | Age: 77
End: 2022-06-01
Payer: MEDICARE

## 2022-07-05 ENCOUNTER — OFFICE VISIT (OUTPATIENT)
Dept: GASTROENTEROLOGY | Facility: CLINIC | Age: 77
End: 2022-07-05
Payer: MEDICARE

## 2022-07-05 VITALS — WEIGHT: 171.06 LBS | HEIGHT: 70 IN | BODY MASS INDEX: 24.49 KG/M2

## 2022-07-05 DIAGNOSIS — K51.20 ULCERATIVE PROCTITIS WITHOUT COMPLICATION: Primary | ICD-10-CM

## 2022-07-05 DIAGNOSIS — Z86.010 HX OF COLONIC POLYPS: ICD-10-CM

## 2022-07-05 PROCEDURE — 1126F AMNT PAIN NOTED NONE PRSNT: CPT | Mod: CPTII,S$GLB,, | Performed by: INTERNAL MEDICINE

## 2022-07-05 PROCEDURE — 1101F PT FALLS ASSESS-DOCD LE1/YR: CPT | Mod: CPTII,S$GLB,, | Performed by: INTERNAL MEDICINE

## 2022-07-05 PROCEDURE — 3051F PR MOST RECENT HEMOGLOBIN A1C LEVEL 7.0 - < 8.0%: ICD-10-PCS | Mod: CPTII,S$GLB,, | Performed by: INTERNAL MEDICINE

## 2022-07-05 PROCEDURE — 1126F PR PAIN SEVERITY QUANTIFIED, NO PAIN PRESENT: ICD-10-PCS | Mod: CPTII,S$GLB,, | Performed by: INTERNAL MEDICINE

## 2022-07-05 PROCEDURE — 1101F PR PT FALLS ASSESS DOC 0-1 FALLS W/OUT INJ PAST YR: ICD-10-PCS | Mod: CPTII,S$GLB,, | Performed by: INTERNAL MEDICINE

## 2022-07-05 PROCEDURE — 3051F HG A1C>EQUAL 7.0%<8.0%: CPT | Mod: CPTII,S$GLB,, | Performed by: INTERNAL MEDICINE

## 2022-07-05 PROCEDURE — 99213 OFFICE O/P EST LOW 20 MIN: CPT | Mod: S$GLB,,, | Performed by: INTERNAL MEDICINE

## 2022-07-05 PROCEDURE — 3288F PR FALLS RISK ASSESSMENT DOCUMENTED: ICD-10-PCS | Mod: CPTII,S$GLB,, | Performed by: INTERNAL MEDICINE

## 2022-07-05 PROCEDURE — 99213 PR OFFICE/OUTPT VISIT, EST, LEVL III, 20-29 MIN: ICD-10-PCS | Mod: S$GLB,,, | Performed by: INTERNAL MEDICINE

## 2022-07-05 PROCEDURE — 99999 PR PBB SHADOW E&M-EST. PATIENT-LVL III: ICD-10-PCS | Mod: PBBFAC,,, | Performed by: INTERNAL MEDICINE

## 2022-07-05 PROCEDURE — 3288F FALL RISK ASSESSMENT DOCD: CPT | Mod: CPTII,S$GLB,, | Performed by: INTERNAL MEDICINE

## 2022-07-05 PROCEDURE — 99999 PR PBB SHADOW E&M-EST. PATIENT-LVL III: CPT | Mod: PBBFAC,,, | Performed by: INTERNAL MEDICINE

## 2022-07-05 NOTE — PROGRESS NOTES
"Subjective:       Patient ID: Tres Peace is a 76 y.o. male.    Chief Complaint: No chief complaint on file.    Mr. Peace returns for follow-up of his ulcerative colitis, status post colonoscopy 3 months ago.  When asked how he was feeling, he replies "all good. "  His bowel movements are now normal.  He is not seeing any blood.  He had an episode about a month ago with diarrhea, but it only lasted a couple days.  And there was no blood then.  His appetite is good.  He is eating well.  He stopped the balsalazide (he ran out a few weeks ago).  And he continues to do well off medication.  He is on no other new medications.  No antibiotics.       See recent Colonoscopy 3/31/2022:  Indications:           Diarrhea, Rectal bleeding, Hx of / Exclusion of                          ulcerative colitis (Has been in remission since  ~2013  Impression:            - Erythematous, friable (with contact bleeding), granular and inflamed mucosa in the rectum, in the                          recto-sigmoid colon and in the sigmoid colon.    Fluid aspiration performed. Biopsied.                          - Left-sided ulcerative colitis.                          - Diverticulosis in the sigmoid colon.                          - Non-bleeding internal hemorrhoids.                          - Scarred mucosa in the descending colon, in the transverse colon and in the ascending colon.                          - Pseudopolyps in the descending colon, in the transverse colon and in the ascending colon.                          - The examination was otherwise normal.                          - Randomly Biopsied.                          - The examined portion of the ileum was normal.   Recommendation:        - Discharge patient to home.                          - Await pathology results.                          - Low fat diet, gluten free diet and lactose free                          diet.                          - Continue present " medications.                          - Use Colazal (balsalazide) 3 capsules PO BID.                          - Call the G.I. clinic in 2 weeks for reports (if                          you haven't heard from us sooner) [phone].                          - Return to GI clinic in 6 weeks.                          See GI note of 6/18/2013:                          Mr. Peace is here to establish in our clinic on                          the [facility], to try to consolidate all his                          physicians here. He is here primarily because of                          his hx of ulcerative colitis. This was dx'd                          12/2010, and has been followed closelly by Dr. Barragan. His last colonoscopy was a year ago,                          5/3/12, without much active disease seen, and Dr. Barragan recommended a repeat at 1 yr hence. He                          was on Lialda, but for financial reasons he                          changed to Apriso. But shortly after the change,                          he developed a rash and stopped the Apriso (about                          2 months ago, April?). The rash resolved. But he                          didn't inform Dr. GILBERT about these events, and is on                          no mesalamine at present. But he reports he is                          asymptomatic. No cramps. No diarrhea. No blood.                          His weight had gotten as low as 148. But now he is                          at 183.                                                  - Repeat colonoscopy in 3 years for surveillance.   Arnoldo Carvalho MD   3/31/2022    A.   COLON, RIGHT AND CECUM, RANDOM BIOPSY:          -Fragments of benign colonic mucosa with mild reactive changes and few   lymphoid aggregates, negative for dysplasia.   B.   LEFT COLON, RANDOM BIOPSY:          -Fragments of benign colonic mucosa with mild  reactive changes,   negative for dysplasia.   C.   COLON, SIGMOID, BIOPSY:          -Chronic active colitis (clinical, ulcerative colitis).          -Negative for dysplasia.   D.   RECTUM, BIOPSY:          -Chronic active proctitis, (clinical, ulcerative colitis).          -Negative for dysplasia.    Review of Systems   Constitutional:  Negative for appetite change, fever and unexpected weight change.   HENT: Negative.  Negative for mouth sores, sore throat and trouble swallowing.    Eyes: Negative.    Respiratory: Negative.  Negative for cough and choking.    Cardiovascular: Negative.  Negative for chest pain and leg swelling.   Gastrointestinal:  Negative for abdominal distention, abdominal pain, anal bleeding, blood in stool, constipation, diarrhea, nausea and vomiting.   Genitourinary: Negative.    Musculoskeletal: Negative.    Integumentary:  Negative for color change and rash. Negative.   Neurological: Negative.    Hematological:  Negative for adenopathy.   Psychiatric/Behavioral: Negative.         Objective:      Physical Exam  Vitals and nursing note reviewed.   Constitutional:       Appearance: Normal appearance. He is well-developed.   HENT:      Mouth/Throat:      Mouth: Mucous membranes are moist.      Pharynx: No oropharyngeal exudate.   Eyes:      General: No scleral icterus.  Neck:      Thyroid: No thyromegaly.      Trachea: No tracheal deviation.   Cardiovascular:      Rate and Rhythm: Normal rate and regular rhythm.      Heart sounds: Normal heart sounds.   Pulmonary:      Effort: Pulmonary effort is normal.      Breath sounds: Normal breath sounds.   Abdominal:      General: Bowel sounds are normal. There is no distension.      Palpations: Abdomen is soft. There is no mass.      Tenderness: There is no abdominal tenderness. There is no guarding.   Lymphadenopathy:      Cervical: No cervical adenopathy.   Skin:     General: Skin is warm and dry.      Findings: No rash.   Neurological:      General:  No focal deficit present.      Mental Status: He is alert and oriented to person, place, and time.   Psychiatric:         Mood and Affect: Mood normal.         Behavior: Behavior normal.       Assessment:     Ulcerative proctitis without complication    Hx of colonic polyps      (Flare of UC, resolving?)  Plan:     1. Continue off meds.   2. RTC p.r.n..

## 2022-08-17 DIAGNOSIS — E11.9 TYPE 2 DIABETES MELLITUS WITHOUT COMPLICATION, UNSPECIFIED WHETHER LONG TERM INSULIN USE: ICD-10-CM

## 2022-08-18 ENCOUNTER — PATIENT MESSAGE (OUTPATIENT)
Dept: FAMILY MEDICINE | Facility: CLINIC | Age: 77
End: 2022-08-18
Payer: MEDICARE

## 2022-08-18 NOTE — TELEPHONE ENCOUNTER
I spoke to Mr. Peace concerning his RX. Pt is under the care of Dr. Julio Miller ([facility]) for his breo ellipta. Pt will contact Dr. Wilkinson's office.

## 2022-08-23 ENCOUNTER — PATIENT MESSAGE (OUTPATIENT)
Dept: ADMINISTRATIVE | Facility: HOSPITAL | Age: 77
End: 2022-08-23
Payer: MEDICARE

## 2022-08-24 ENCOUNTER — PATIENT MESSAGE (OUTPATIENT)
Dept: ADMINISTRATIVE | Facility: HOSPITAL | Age: 77
End: 2022-08-24
Payer: MEDICARE

## 2022-09-07 LAB
LEFT EYE DM RETINOPATHY: NEGATIVE
RIGHT EYE DM RETINOPATHY: NEGATIVE

## 2022-09-08 ENCOUNTER — PATIENT MESSAGE (OUTPATIENT)
Dept: FAMILY MEDICINE | Facility: CLINIC | Age: 77
End: 2022-09-08
Payer: MEDICARE

## 2022-09-08 DIAGNOSIS — E11.9 TYPE 2 DIABETES MELLITUS WITHOUT COMPLICATION: ICD-10-CM

## 2022-09-12 ENCOUNTER — LAB VISIT (OUTPATIENT)
Dept: LAB | Facility: HOSPITAL | Age: 77
End: 2022-09-12
Attending: FAMILY MEDICINE
Payer: MEDICARE

## 2022-09-12 ENCOUNTER — OFFICE VISIT (OUTPATIENT)
Dept: FAMILY MEDICINE | Facility: CLINIC | Age: 77
End: 2022-09-12
Payer: MEDICARE

## 2022-09-12 VITALS
SYSTOLIC BLOOD PRESSURE: 116 MMHG | HEIGHT: 70 IN | HEART RATE: 62 BPM | RESPIRATION RATE: 18 BRPM | DIASTOLIC BLOOD PRESSURE: 78 MMHG | OXYGEN SATURATION: 97 % | WEIGHT: 174.19 LBS | BODY MASS INDEX: 24.94 KG/M2

## 2022-09-12 DIAGNOSIS — E78.5 TYPE 2 DIABETES MELLITUS WITH HYPERLIPIDEMIA: ICD-10-CM

## 2022-09-12 DIAGNOSIS — E78.5 HYPERLIPIDEMIA, UNSPECIFIED HYPERLIPIDEMIA TYPE: ICD-10-CM

## 2022-09-12 DIAGNOSIS — R97.20 ELEVATED PSA: ICD-10-CM

## 2022-09-12 DIAGNOSIS — Z00.00 WELLNESS EXAMINATION: Primary | ICD-10-CM

## 2022-09-12 DIAGNOSIS — E11.59 HYPERTENSION ASSOCIATED WITH DIABETES: ICD-10-CM

## 2022-09-12 DIAGNOSIS — E11.69 TYPE 2 DIABETES MELLITUS WITH HYPERLIPIDEMIA: ICD-10-CM

## 2022-09-12 DIAGNOSIS — I10 ESSENTIAL HYPERTENSION: ICD-10-CM

## 2022-09-12 DIAGNOSIS — J40 BRONCHITIS: ICD-10-CM

## 2022-09-12 DIAGNOSIS — I15.2 HYPERTENSION ASSOCIATED WITH DIABETES: ICD-10-CM

## 2022-09-12 PROBLEM — K92.1 BLOOD IN STOOL: Status: RESOLVED | Noted: 2022-03-31 | Resolved: 2022-09-12

## 2022-09-12 LAB
ALBUMIN SERPL BCP-MCNC: 4.3 G/DL (ref 3.5–5.2)
ALP SERPL-CCNC: 100 U/L (ref 55–135)
ALT SERPL W/O P-5'-P-CCNC: 28 U/L (ref 10–44)
ANION GAP SERPL CALC-SCNC: 9 MMOL/L (ref 8–16)
AST SERPL-CCNC: 26 U/L (ref 10–40)
BILIRUB SERPL-MCNC: 0.8 MG/DL (ref 0.1–1)
BUN SERPL-MCNC: 13 MG/DL (ref 8–23)
CALCIUM SERPL-MCNC: 9.6 MG/DL (ref 8.7–10.5)
CHLORIDE SERPL-SCNC: 106 MMOL/L (ref 95–110)
CHOLEST SERPL-MCNC: 171 MG/DL (ref 120–199)
CHOLEST/HDLC SERPL: 3.5 {RATIO} (ref 2–5)
CO2 SERPL-SCNC: 26 MMOL/L (ref 23–29)
COMPLEXED PSA SERPL-MCNC: 5.1 NG/ML (ref 0–4)
CREAT SERPL-MCNC: 0.8 MG/DL (ref 0.5–1.4)
ERYTHROCYTE [DISTWIDTH] IN BLOOD BY AUTOMATED COUNT: 13.8 % (ref 11.5–14.5)
EST. GFR  (NO RACE VARIABLE): >60 ML/MIN/1.73 M^2
ESTIMATED AVG GLUCOSE: 174 MG/DL (ref 68–131)
GLUCOSE SERPL-MCNC: 69 MG/DL (ref 70–110)
HBA1C MFR BLD: 7.7 % (ref 4–5.6)
HCT VFR BLD AUTO: 43 % (ref 40–54)
HDLC SERPL-MCNC: 49 MG/DL (ref 40–75)
HDLC SERPL: 28.7 % (ref 20–50)
HGB BLD-MCNC: 13.9 G/DL (ref 14–18)
LDLC SERPL CALC-MCNC: 107.2 MG/DL (ref 63–159)
MCH RBC QN AUTO: 29.8 PG (ref 27–31)
MCHC RBC AUTO-ENTMCNC: 32.3 G/DL (ref 32–36)
MCV RBC AUTO: 92 FL (ref 82–98)
NONHDLC SERPL-MCNC: 122 MG/DL
PLATELET # BLD AUTO: 250 K/UL (ref 150–450)
PMV BLD AUTO: 10.8 FL (ref 9.2–12.9)
POTASSIUM SERPL-SCNC: 4.1 MMOL/L (ref 3.5–5.1)
PROT SERPL-MCNC: 7.7 G/DL (ref 6–8.4)
RBC # BLD AUTO: 4.66 M/UL (ref 4.6–6.2)
SODIUM SERPL-SCNC: 141 MMOL/L (ref 136–145)
TRIGL SERPL-MCNC: 74 MG/DL (ref 30–150)
TSH SERPL DL<=0.005 MIU/L-ACNC: 1.69 UIU/ML (ref 0.4–4)
WBC # BLD AUTO: 8.43 K/UL (ref 3.9–12.7)

## 2022-09-12 PROCEDURE — 1101F PR PT FALLS ASSESS DOC 0-1 FALLS W/OUT INJ PAST YR: ICD-10-PCS | Mod: CPTII,S$GLB,, | Performed by: FAMILY MEDICINE

## 2022-09-12 PROCEDURE — 1126F AMNT PAIN NOTED NONE PRSNT: CPT | Mod: CPTII,S$GLB,, | Performed by: FAMILY MEDICINE

## 2022-09-12 PROCEDURE — 1159F MED LIST DOCD IN RCRD: CPT | Mod: CPTII,S$GLB,, | Performed by: FAMILY MEDICINE

## 2022-09-12 PROCEDURE — 80053 COMPREHEN METABOLIC PANEL: CPT | Performed by: FAMILY MEDICINE

## 2022-09-12 PROCEDURE — 90694 FLU VACCINE - QUADRIVALENT - ADJUVANTED: ICD-10-PCS | Mod: S$GLB,,, | Performed by: FAMILY MEDICINE

## 2022-09-12 PROCEDURE — 99499 UNLISTED E&M SERVICE: CPT | Mod: S$GLB,,, | Performed by: FAMILY MEDICINE

## 2022-09-12 PROCEDURE — 3051F HG A1C>EQUAL 7.0%<8.0%: CPT | Mod: CPTII,S$GLB,, | Performed by: FAMILY MEDICINE

## 2022-09-12 PROCEDURE — 99397 PR PREVENTIVE VISIT,EST,65 & OVER: ICD-10-PCS | Mod: S$GLB,,, | Performed by: FAMILY MEDICINE

## 2022-09-12 PROCEDURE — 3078F PR MOST RECENT DIASTOLIC BLOOD PRESSURE < 80 MM HG: ICD-10-PCS | Mod: CPTII,S$GLB,, | Performed by: FAMILY MEDICINE

## 2022-09-12 PROCEDURE — 99999 PR PBB SHADOW E&M-EST. PATIENT-LVL III: ICD-10-PCS | Mod: PBBFAC,,, | Performed by: FAMILY MEDICINE

## 2022-09-12 PROCEDURE — 90694 VACC AIIV4 NO PRSRV 0.5ML IM: CPT | Mod: S$GLB,,, | Performed by: FAMILY MEDICINE

## 2022-09-12 PROCEDURE — 80061 LIPID PANEL: CPT | Performed by: FAMILY MEDICINE

## 2022-09-12 PROCEDURE — 84443 ASSAY THYROID STIM HORMONE: CPT | Performed by: FAMILY MEDICINE

## 2022-09-12 PROCEDURE — 84153 ASSAY OF PSA TOTAL: CPT | Performed by: FAMILY MEDICINE

## 2022-09-12 PROCEDURE — 3074F PR MOST RECENT SYSTOLIC BLOOD PRESSURE < 130 MM HG: ICD-10-PCS | Mod: CPTII,S$GLB,, | Performed by: FAMILY MEDICINE

## 2022-09-12 PROCEDURE — 3288F FALL RISK ASSESSMENT DOCD: CPT | Mod: CPTII,S$GLB,, | Performed by: FAMILY MEDICINE

## 2022-09-12 PROCEDURE — 99999 PR PBB SHADOW E&M-EST. PATIENT-LVL III: CPT | Mod: PBBFAC,,, | Performed by: FAMILY MEDICINE

## 2022-09-12 PROCEDURE — G0008 FLU VACCINE - QUADRIVALENT - ADJUVANTED: ICD-10-PCS | Mod: S$GLB,,, | Performed by: FAMILY MEDICINE

## 2022-09-12 PROCEDURE — 3074F SYST BP LT 130 MM HG: CPT | Mod: CPTII,S$GLB,, | Performed by: FAMILY MEDICINE

## 2022-09-12 PROCEDURE — 3051F PR MOST RECENT HEMOGLOBIN A1C LEVEL 7.0 - < 8.0%: ICD-10-PCS | Mod: CPTII,S$GLB,, | Performed by: FAMILY MEDICINE

## 2022-09-12 PROCEDURE — 36415 COLL VENOUS BLD VENIPUNCTURE: CPT | Mod: PO | Performed by: FAMILY MEDICINE

## 2022-09-12 PROCEDURE — 3078F DIAST BP <80 MM HG: CPT | Mod: CPTII,S$GLB,, | Performed by: FAMILY MEDICINE

## 2022-09-12 PROCEDURE — 99397 PER PM REEVAL EST PAT 65+ YR: CPT | Mod: S$GLB,,, | Performed by: FAMILY MEDICINE

## 2022-09-12 PROCEDURE — 85027 COMPLETE CBC AUTOMATED: CPT | Performed by: FAMILY MEDICINE

## 2022-09-12 PROCEDURE — 1159F PR MEDICATION LIST DOCUMENTED IN MEDICAL RECORD: ICD-10-PCS | Mod: CPTII,S$GLB,, | Performed by: FAMILY MEDICINE

## 2022-09-12 PROCEDURE — G0008 ADMIN INFLUENZA VIRUS VAC: HCPCS | Mod: S$GLB,,, | Performed by: FAMILY MEDICINE

## 2022-09-12 PROCEDURE — 83036 HEMOGLOBIN GLYCOSYLATED A1C: CPT | Performed by: FAMILY MEDICINE

## 2022-09-12 PROCEDURE — 1101F PT FALLS ASSESS-DOCD LE1/YR: CPT | Mod: CPTII,S$GLB,, | Performed by: FAMILY MEDICINE

## 2022-09-12 PROCEDURE — 1126F PR PAIN SEVERITY QUANTIFIED, NO PAIN PRESENT: ICD-10-PCS | Mod: CPTII,S$GLB,, | Performed by: FAMILY MEDICINE

## 2022-09-12 PROCEDURE — 3288F PR FALLS RISK ASSESSMENT DOCUMENTED: ICD-10-PCS | Mod: CPTII,S$GLB,, | Performed by: FAMILY MEDICINE

## 2022-09-12 RX ORDER — ATORVASTATIN CALCIUM 40 MG/1
40 TABLET, FILM COATED ORAL DAILY
Qty: 90 TABLET | Refills: 3 | Status: SHIPPED | OUTPATIENT
Start: 2022-09-12 | End: 2022-12-20 | Stop reason: SDUPTHER

## 2022-09-12 RX ORDER — INSULIN GLARGINE 100 [IU]/ML
INJECTION, SOLUTION SUBCUTANEOUS
Qty: 30 ML | Refills: 5 | Status: SHIPPED | OUTPATIENT
Start: 2022-09-12 | End: 2022-12-14

## 2022-09-12 RX ORDER — FLUTICASONE FUROATE AND VILANTEROL 100; 25 UG/1; UG/1
POWDER RESPIRATORY (INHALATION)
Qty: 180 EACH | Refills: 3 | Status: SHIPPED | OUTPATIENT
Start: 2022-09-12 | End: 2024-01-29 | Stop reason: SDUPTHER

## 2022-09-12 RX ORDER — GLIPIZIDE 10 MG/1
10 TABLET ORAL DAILY
Qty: 90 TABLET | Refills: 3 | Status: SHIPPED | OUTPATIENT
Start: 2022-09-12 | End: 2022-09-29 | Stop reason: SDUPTHER

## 2022-09-12 NOTE — PROGRESS NOTES
Subjective:       Patient ID: Tres Peace is a 76 y.o. male.    Chief Complaint: Follow-up (6 month follow up)    Here for wellness and f/u multiple chronic medical issues. Doing well overall.     Follow-up  Pertinent negatives include no chest pain, chills, coughing or fever.   Review of Systems   Constitutional:  Negative for chills and fever.   Respiratory:  Negative for cough, chest tightness and shortness of breath.    Cardiovascular:  Negative for chest pain, palpitations and leg swelling.   Endocrine: Negative for cold intolerance and heat intolerance.   Psychiatric/Behavioral:  Negative for decreased concentration. The patient is not nervous/anxious.      Objective:      Physical Exam  Vitals and nursing note reviewed.   Constitutional:       Appearance: He is well-developed.   HENT:      Head: Normocephalic and atraumatic.   Cardiovascular:      Rate and Rhythm: Normal rate and regular rhythm.      Heart sounds: Normal heart sounds.   Pulmonary:      Effort: Pulmonary effort is normal.      Breath sounds: Normal breath sounds.       Assessment:       1. Wellness examination    2. Type 2 diabetes mellitus with hyperlipidemia    3. Hyperlipidemia, unspecified hyperlipidemia type    4. Essential hypertension    5. Elevated PSA    6. Hypertension associated with diabetes    7. Bronchitis          Plan:       Wellness examination    Type 2 diabetes mellitus with hyperlipidemia  -     CBC Without Differential; Future; Expected date: 09/12/2022  -     Hemoglobin A1C; Future; Expected date: 09/12/2022  -     Lipid Panel; Future; Expected date: 09/12/2022  -     TSH; Future; Expected date: 09/12/2022  -     Comprehensive Metabolic Panel; Future; Expected date: 09/12/2022  -     atorvastatin (LIPITOR) 40 MG tablet; Take 1 tablet (40 mg total) by mouth once daily.  Dispense: 90 tablet; Refill: 3  -     glipiZIDE (GLUCOTROL) 10 MG tablet; Take 1 tablet (10 mg total) by mouth once daily.  Dispense: 90 tablet;  Refill: 3  -     insulin (LANTUS SOLOSTAR U-100 INSULIN) glargine 100 units/mL SubQ pen; INJECT 18 UNITS INTO THE SKIN ONCE DAILY. Strength: 100 unit/mL (3 mL)  Dispense: 30 mL; Refill: 5    Hyperlipidemia, unspecified hyperlipidemia type    Essential hypertension    Elevated PSA  -     PROSTATE SPECIFIC ANTIGEN, DIAGNOSTIC; Future; Expected date: 09/12/2022    Hypertension associated with diabetes  -     glipiZIDE (GLUCOTROL) 10 MG tablet; Take 1 tablet (10 mg total) by mouth once daily.  Dispense: 90 tablet; Refill: 3    Bronchitis  -     fluticasone furoate-vilanteroL (BREO ELLIPTA) 100-25 mcg/dose diskus inhaler; INHALE 1 PUFF BY INHALATION ROUTE ONCE DAILY AT THE SAME TIME EACH DAY  Dispense: 180 each; Refill: 3    Other orders  -     Influenza - Quadrivalent (Adjuvanted)  Update labs and health maintenance  Diabetes stable but due for hga1c  Lipid stable  Htn stable  Will monitor chronic medical issues and continue current plan of care.      Follow up in about 6 months (around 3/12/2023), or if symptoms worsen or fail to improve.

## 2022-09-13 ENCOUNTER — PATIENT MESSAGE (OUTPATIENT)
Dept: ADMINISTRATIVE | Facility: HOSPITAL | Age: 77
End: 2022-09-13
Payer: MEDICARE

## 2022-09-15 ENCOUNTER — PATIENT MESSAGE (OUTPATIENT)
Dept: FAMILY MEDICINE | Facility: CLINIC | Age: 77
End: 2022-09-15
Payer: MEDICARE

## 2022-09-16 ENCOUNTER — PATIENT MESSAGE (OUTPATIENT)
Dept: FAMILY MEDICINE | Facility: CLINIC | Age: 77
End: 2022-09-16
Payer: MEDICARE

## 2022-09-17 ENCOUNTER — PATIENT MESSAGE (OUTPATIENT)
Dept: FAMILY MEDICINE | Facility: CLINIC | Age: 77
End: 2022-09-17
Payer: MEDICARE

## 2022-09-19 ENCOUNTER — PATIENT MESSAGE (OUTPATIENT)
Dept: FAMILY MEDICINE | Facility: CLINIC | Age: 77
End: 2022-09-19
Payer: MEDICARE

## 2022-09-20 ENCOUNTER — PATIENT MESSAGE (OUTPATIENT)
Dept: FAMILY MEDICINE | Facility: CLINIC | Age: 77
End: 2022-09-20
Payer: MEDICARE

## 2022-09-29 DIAGNOSIS — E11.69 TYPE 2 DIABETES MELLITUS WITH HYPERLIPIDEMIA: ICD-10-CM

## 2022-09-29 DIAGNOSIS — E78.5 TYPE 2 DIABETES MELLITUS WITH HYPERLIPIDEMIA: ICD-10-CM

## 2022-09-29 DIAGNOSIS — E11.59 HYPERTENSION ASSOCIATED WITH DIABETES: ICD-10-CM

## 2022-09-29 DIAGNOSIS — I15.2 HYPERTENSION ASSOCIATED WITH DIABETES: ICD-10-CM

## 2022-09-29 RX ORDER — GLIPIZIDE 10 MG/1
10 TABLET ORAL DAILY
Qty: 90 TABLET | Refills: 1 | Status: SHIPPED | OUTPATIENT
Start: 2022-09-29 | End: 2023-02-24

## 2022-09-29 NOTE — TELEPHONE ENCOUNTER
Refill Decision Note   Tres Peace  is requesting a refill authorization.  Brief Assessment and Rationale for Refill:  Approve     Medication Therapy Plan:       Medication Reconciliation Completed: No   Comments:     No Care Gaps recommended.     Note composed:12:16 PM 09/29/2022

## 2022-09-29 NOTE — TELEPHONE ENCOUNTER
No new care gaps identified.  [facility] Embedded Care Gaps. Reference number: [id number]. 9/29/2022   9:51:32 AM DAIJAT

## 2022-10-17 ENCOUNTER — PATIENT MESSAGE (OUTPATIENT)
Dept: ADMINISTRATIVE | Facility: HOSPITAL | Age: 77
End: 2022-10-17
Payer: MEDICARE

## 2022-10-19 ENCOUNTER — PATIENT OUTREACH (OUTPATIENT)
Dept: ADMINISTRATIVE | Facility: HOSPITAL | Age: 77
End: 2022-10-19
Payer: MEDICARE

## 2022-10-19 NOTE — LETTER
AUTHORIZATION FOR RELEASE OF   CONFIDENTIAL INFORMATION    Dear Baldemar Rivas OD    We are seeing Tres Peace, date of birth 1945, in the clinic at [facility], [place]. BRANT Torres MD is the patient's PCP. Tres Peace has an outstanding lab/procedure at the time we reviewed his chart. In order to help keep his health information updated, he has authorized us to request the following medical record(s):        (  )  MAMMOGRAM                                      (  )  COLONOSCOPY      (  )  PAP SMEAR                                          (  )  OUTSIDE LAB RESULTS     (  )  DEXA SCAN                                          (  x)  EYE EXAM            (  )  FOOT EXAM                                          (  )  ENTIRE RECORD     (  )  OUTSIDE IMMUNIZATIONS                 (  )  _______________     Please fax records to [facility], [address] Michael Ellerbe, [place], [phone]     If you have any questions, please contact Yanira Garber LPN [facility]          Patient Name: Tres Peace  : 1945  Patient Phone #: [phone]

## 2022-10-21 ENCOUNTER — PATIENT OUTREACH (OUTPATIENT)
Dept: ADMINISTRATIVE | Facility: HOSPITAL | Age: 77
End: 2022-10-21
Payer: MEDICARE

## 2022-12-20 DIAGNOSIS — E78.5 TYPE 2 DIABETES MELLITUS WITH HYPERLIPIDEMIA: ICD-10-CM

## 2022-12-20 DIAGNOSIS — E11.69 TYPE 2 DIABETES MELLITUS WITH HYPERLIPIDEMIA: ICD-10-CM

## 2022-12-20 RX ORDER — ATORVASTATIN CALCIUM 40 MG/1
40 TABLET, FILM COATED ORAL DAILY
Qty: 90 TABLET | Refills: 2 | Status: SHIPPED | OUTPATIENT
Start: 2022-12-20 | End: 2024-01-29 | Stop reason: SDUPTHER

## 2022-12-20 NOTE — TELEPHONE ENCOUNTER
Refill Decision Note   Tres Peace  is requesting a refill authorization.  Brief Assessment and Rationale for Refill:  Approve     Medication Therapy Plan:       Medication Reconciliation Completed: No   Comments:     No Care Gaps recommended.     Note composed:3:42 PM 12/20/2022

## 2022-12-20 NOTE — TELEPHONE ENCOUNTER
Patient Requesting Refill  LOV:9/12/22  Last fill date:9/12/22  Allergies reviewed  Medication Pended

## 2022-12-20 NOTE — TELEPHONE ENCOUNTER
No new care gaps identified.  [facility] Embedded Care Gaps. Reference number: [id number]. 12/20/2022   9:49:06 AM CST

## 2022-12-20 NOTE — TELEPHONE ENCOUNTER
----- Message from Yesi Alvarado sent at 12/20/2022 11:17 AM CST -----  Contact: Ct/Jacquie  Type:  RX Refill Request    Who Called: Ct Dhillon  Refill or New Rx: New Rx  RX Name and Strength:atorvastatin (LIPITOR) 40 MG tablet    Preferred Pharmacy with phone number:  [facility] - [place], [place] - [address]  [address] [address]  Phone: [phone] Fax: [phone]    Local or Mail Order:Mail   Ordering Provider:Dr. Torres

## 2022-12-21 DIAGNOSIS — E11.69 TYPE 2 DIABETES MELLITUS WITH HYPERLIPIDEMIA: ICD-10-CM

## 2022-12-21 DIAGNOSIS — E78.5 TYPE 2 DIABETES MELLITUS WITH HYPERLIPIDEMIA: ICD-10-CM

## 2022-12-21 RX ORDER — ATORVASTATIN CALCIUM 40 MG/1
TABLET, FILM COATED ORAL
Refills: 0 | OUTPATIENT
Start: 2022-12-21

## 2022-12-21 NOTE — TELEPHONE ENCOUNTER
No new care gaps identified.  [facility] Embedded Care Gaps. Reference number: [id number]. 12/21/2022   11:59:06 AM CST

## 2022-12-22 NOTE — TELEPHONE ENCOUNTER
Refill Decision Note   Tres Peace  is requesting a refill authorization.  Brief Assessment and Rationale for Refill:  Quick Discontinue     Medication Therapy Plan: Pharmacy is requesting new scripts for the following medications without required information, (sig/ frequency/qty/etc)     Medication Reconciliation Completed: No   Comments:     No Care Gaps recommended.     Note composed:7:04 PM 12/21/2022

## 2023-02-09 DIAGNOSIS — Z00.00 ENCOUNTER FOR MEDICARE ANNUAL WELLNESS EXAM: ICD-10-CM

## 2023-03-06 ENCOUNTER — PATIENT MESSAGE (OUTPATIENT)
Dept: FAMILY MEDICINE | Facility: CLINIC | Age: 78
End: 2023-03-06
Payer: MEDICARE

## 2023-03-06 DIAGNOSIS — E78.5 TYPE 2 DIABETES MELLITUS WITH HYPERLIPIDEMIA: ICD-10-CM

## 2023-03-06 DIAGNOSIS — E11.69 TYPE 2 DIABETES MELLITUS WITH HYPERLIPIDEMIA: ICD-10-CM

## 2023-03-06 NOTE — TELEPHONE ENCOUNTER
"----- Message from Chloé Berger sent at 3/6/2023 12:49 PM CST -----  Contact: Pt @ [phone]  Type:  RX Refill Request    Who Called: Pt  Refill or New Rx: Refill  RX Name and Strength:pen needle, diabetic (BD ULTRA-FINE EMERSON PEN NEEDLE) 32 gauge x 5/32" Ndle  How is the patient currently taking it? (ex. 1XDay):1XDay  Is this a 30 day or 90 day RX:90 day  Preferred Pharmacy with phone number:    [facility]/pharmacy #[id number] - [place] - [address] [facility]  [address] [place] [id number]  Phone: [phone] Fax: [phone]    Local or Mail Order:Local  Ordering Provider:Paul Torres  Would the patient rather a call back or a response via [facility]? call  Best Call Back Number:[phone]  Additional Information: Pt needs this medication called in to his pharmacy today.         "

## 2023-03-06 NOTE — TELEPHONE ENCOUNTER
No new care gaps identified.  [facility] Embedded Care Gaps. Reference number: [id number]. 3/06/2023   2:21:04 PM CST

## 2023-03-07 RX ORDER — PEN NEEDLE, DIABETIC 30 GX3/16"
NEEDLE, DISPOSABLE MISCELLANEOUS
Qty: 100 EACH | Refills: 3 | OUTPATIENT
Start: 2023-03-07

## 2023-03-07 NOTE — TELEPHONE ENCOUNTER
Refill Decision Note   Tres Peace  is requesting a refill authorization.  Brief Assessment and Rationale for Refill:  Quick Discontinue     Medication Therapy Plan:       Medication Reconciliation Completed: No   Comments:     No Care Gaps recommended.     Note composed:5:40 AM 03/07/2023

## 2023-03-13 ENCOUNTER — LAB VISIT (OUTPATIENT)
Dept: LAB | Facility: HOSPITAL | Age: 78
End: 2023-03-13
Attending: FAMILY MEDICINE
Payer: MEDICARE

## 2023-03-13 ENCOUNTER — OFFICE VISIT (OUTPATIENT)
Dept: FAMILY MEDICINE | Facility: CLINIC | Age: 78
End: 2023-03-13
Payer: MEDICARE

## 2023-03-13 VITALS
OXYGEN SATURATION: 97 % | SYSTOLIC BLOOD PRESSURE: 120 MMHG | BODY MASS INDEX: 25.15 KG/M2 | DIASTOLIC BLOOD PRESSURE: 80 MMHG | HEART RATE: 64 BPM | HEIGHT: 70 IN | WEIGHT: 175.69 LBS

## 2023-03-13 DIAGNOSIS — I15.2 HYPERTENSION ASSOCIATED WITH DIABETES: ICD-10-CM

## 2023-03-13 DIAGNOSIS — E78.5 TYPE 2 DIABETES MELLITUS WITH HYPERLIPIDEMIA: ICD-10-CM

## 2023-03-13 DIAGNOSIS — Z12.5 SCREENING FOR MALIGNANT NEOPLASM OF PROSTATE: ICD-10-CM

## 2023-03-13 DIAGNOSIS — E11.69 TYPE 2 DIABETES MELLITUS WITH HYPERLIPIDEMIA: ICD-10-CM

## 2023-03-13 DIAGNOSIS — R97.20 ELEVATED PSA: ICD-10-CM

## 2023-03-13 DIAGNOSIS — E78.5 HYPERLIPIDEMIA, UNSPECIFIED HYPERLIPIDEMIA TYPE: ICD-10-CM

## 2023-03-13 DIAGNOSIS — E11.59 HYPERTENSION ASSOCIATED WITH DIABETES: ICD-10-CM

## 2023-03-13 DIAGNOSIS — K51.20 ULCERATIVE PROCTITIS WITHOUT COMPLICATION: ICD-10-CM

## 2023-03-13 DIAGNOSIS — I10 ESSENTIAL HYPERTENSION: Primary | ICD-10-CM

## 2023-03-13 LAB
ALBUMIN SERPL BCP-MCNC: 4 G/DL (ref 3.5–5.2)
ALP SERPL-CCNC: 94 U/L (ref 55–135)
ALT SERPL W/O P-5'-P-CCNC: 20 U/L (ref 10–44)
ANION GAP SERPL CALC-SCNC: 11 MMOL/L (ref 8–16)
AST SERPL-CCNC: 20 U/L (ref 10–40)
BILIRUB SERPL-MCNC: 0.7 MG/DL (ref 0.1–1)
BUN SERPL-MCNC: 15 MG/DL (ref 8–23)
CALCIUM SERPL-MCNC: 9.7 MG/DL (ref 8.7–10.5)
CHLORIDE SERPL-SCNC: 103 MMOL/L (ref 95–110)
CHOLEST SERPL-MCNC: 144 MG/DL (ref 120–199)
CHOLEST/HDLC SERPL: 3.7 {RATIO} (ref 2–5)
CO2 SERPL-SCNC: 29 MMOL/L (ref 23–29)
COMPLEXED PSA SERPL-MCNC: 5.4 NG/ML (ref 0–4)
CREAT SERPL-MCNC: 0.9 MG/DL (ref 0.5–1.4)
ERYTHROCYTE [DISTWIDTH] IN BLOOD BY AUTOMATED COUNT: 13.5 % (ref 11.5–14.5)
EST. GFR  (NO RACE VARIABLE): >60 ML/MIN/1.73 M^2
ESTIMATED AVG GLUCOSE: 177 MG/DL (ref 68–131)
GLUCOSE SERPL-MCNC: 122 MG/DL (ref 70–110)
HBA1C MFR BLD: 7.8 % (ref 4–5.6)
HCT VFR BLD AUTO: 44.2 % (ref 40–54)
HDLC SERPL-MCNC: 39 MG/DL (ref 40–75)
HDLC SERPL: 27.1 % (ref 20–50)
HGB BLD-MCNC: 14.2 G/DL (ref 14–18)
LDLC SERPL CALC-MCNC: 87 MG/DL (ref 63–159)
MCH RBC QN AUTO: 29.5 PG (ref 27–31)
MCHC RBC AUTO-ENTMCNC: 32.1 G/DL (ref 32–36)
MCV RBC AUTO: 92 FL (ref 82–98)
NONHDLC SERPL-MCNC: 105 MG/DL
PLATELET # BLD AUTO: 216 K/UL (ref 150–450)
PMV BLD AUTO: 10.2 FL (ref 9.2–12.9)
POTASSIUM SERPL-SCNC: 4.8 MMOL/L (ref 3.5–5.1)
PROT SERPL-MCNC: 7.2 G/DL (ref 6–8.4)
RBC # BLD AUTO: 4.81 M/UL (ref 4.6–6.2)
SODIUM SERPL-SCNC: 143 MMOL/L (ref 136–145)
TRIGL SERPL-MCNC: 90 MG/DL (ref 30–150)
TSH SERPL DL<=0.005 MIU/L-ACNC: 2.02 UIU/ML (ref 0.4–4)
WBC # BLD AUTO: 7.1 K/UL (ref 3.9–12.7)

## 2023-03-13 PROCEDURE — 84153 ASSAY OF PSA TOTAL: CPT | Mod: HCNC | Performed by: FAMILY MEDICINE

## 2023-03-13 PROCEDURE — 1101F PR PT FALLS ASSESS DOC 0-1 FALLS W/OUT INJ PAST YR: ICD-10-PCS | Mod: HCNC,CPTII,S$GLB, | Performed by: FAMILY MEDICINE

## 2023-03-13 PROCEDURE — 99214 PR OFFICE/OUTPT VISIT, EST, LEVL IV, 30-39 MIN: ICD-10-PCS | Mod: HCNC,S$GLB,, | Performed by: FAMILY MEDICINE

## 2023-03-13 PROCEDURE — 1159F MED LIST DOCD IN RCRD: CPT | Mod: HCNC,CPTII,S$GLB, | Performed by: FAMILY MEDICINE

## 2023-03-13 PROCEDURE — 1126F AMNT PAIN NOTED NONE PRSNT: CPT | Mod: HCNC,CPTII,S$GLB, | Performed by: FAMILY MEDICINE

## 2023-03-13 PROCEDURE — 85027 COMPLETE CBC AUTOMATED: CPT | Mod: HCNC | Performed by: FAMILY MEDICINE

## 2023-03-13 PROCEDURE — 99214 OFFICE O/P EST MOD 30 MIN: CPT | Mod: HCNC,S$GLB,, | Performed by: FAMILY MEDICINE

## 2023-03-13 PROCEDURE — 3288F PR FALLS RISK ASSESSMENT DOCUMENTED: ICD-10-PCS | Mod: HCNC,CPTII,S$GLB, | Performed by: FAMILY MEDICINE

## 2023-03-13 PROCEDURE — 83036 HEMOGLOBIN GLYCOSYLATED A1C: CPT | Mod: HCNC | Performed by: FAMILY MEDICINE

## 2023-03-13 PROCEDURE — 3288F FALL RISK ASSESSMENT DOCD: CPT | Mod: HCNC,CPTII,S$GLB, | Performed by: FAMILY MEDICINE

## 2023-03-13 PROCEDURE — 1101F PT FALLS ASSESS-DOCD LE1/YR: CPT | Mod: HCNC,CPTII,S$GLB, | Performed by: FAMILY MEDICINE

## 2023-03-13 PROCEDURE — 1159F PR MEDICATION LIST DOCUMENTED IN MEDICAL RECORD: ICD-10-PCS | Mod: HCNC,CPTII,S$GLB, | Performed by: FAMILY MEDICINE

## 2023-03-13 PROCEDURE — 99999 PR PBB SHADOW E&M-EST. PATIENT-LVL III: ICD-10-PCS | Mod: PBBFAC,HCNC,, | Performed by: FAMILY MEDICINE

## 2023-03-13 PROCEDURE — 84443 ASSAY THYROID STIM HORMONE: CPT | Mod: HCNC | Performed by: FAMILY MEDICINE

## 2023-03-13 PROCEDURE — 3074F SYST BP LT 130 MM HG: CPT | Mod: HCNC,CPTII,S$GLB, | Performed by: FAMILY MEDICINE

## 2023-03-13 PROCEDURE — 80053 COMPREHEN METABOLIC PANEL: CPT | Mod: HCNC | Performed by: FAMILY MEDICINE

## 2023-03-13 PROCEDURE — 36415 COLL VENOUS BLD VENIPUNCTURE: CPT | Mod: HCNC,PO | Performed by: FAMILY MEDICINE

## 2023-03-13 PROCEDURE — 99999 PR PBB SHADOW E&M-EST. PATIENT-LVL III: CPT | Mod: PBBFAC,HCNC,, | Performed by: FAMILY MEDICINE

## 2023-03-13 PROCEDURE — 3074F PR MOST RECENT SYSTOLIC BLOOD PRESSURE < 130 MM HG: ICD-10-PCS | Mod: HCNC,CPTII,S$GLB, | Performed by: FAMILY MEDICINE

## 2023-03-13 PROCEDURE — 3079F PR MOST RECENT DIASTOLIC BLOOD PRESSURE 80-89 MM HG: ICD-10-PCS | Mod: HCNC,CPTII,S$GLB, | Performed by: FAMILY MEDICINE

## 2023-03-13 PROCEDURE — 80061 LIPID PANEL: CPT | Mod: HCNC | Performed by: FAMILY MEDICINE

## 2023-03-13 PROCEDURE — 1126F PR PAIN SEVERITY QUANTIFIED, NO PAIN PRESENT: ICD-10-PCS | Mod: HCNC,CPTII,S$GLB, | Performed by: FAMILY MEDICINE

## 2023-03-13 PROCEDURE — 3079F DIAST BP 80-89 MM HG: CPT | Mod: HCNC,CPTII,S$GLB, | Performed by: FAMILY MEDICINE

## 2023-03-13 RX ORDER — INSULIN GLARGINE 100 [IU]/ML
INJECTION, SOLUTION SUBCUTANEOUS
Qty: 30 ML | Refills: 3 | Status: SHIPPED | OUTPATIENT
Start: 2023-03-13

## 2023-03-13 RX ORDER — GLIPIZIDE 10 MG/1
10 TABLET ORAL DAILY
Qty: 90 TABLET | Refills: 3 | Status: SHIPPED | OUTPATIENT
Start: 2023-03-13

## 2023-03-13 NOTE — PROGRESS NOTES
Subjective:       Patient ID: Tres Peace is a 77 y.o. male.    Chief Complaint: Sinus Problem    Here for follow up multiple chronic medical issues. Doing well overall and in normal state of health.  Worse AR symptoms and flonase helps.      Sinus Problem  Pertinent negatives include no chills, coughing or shortness of breath.   Review of Systems   Constitutional:  Negative for chills and fever.   Respiratory:  Negative for cough, chest tightness and shortness of breath.    Cardiovascular:  Negative for chest pain, palpitations and leg swelling.   Endocrine: Negative for cold intolerance and heat intolerance.   Psychiatric/Behavioral:  Negative for decreased concentration. The patient is not nervous/anxious.      Objective:      Physical Exam  Vitals and nursing note reviewed.   Constitutional:       Appearance: He is well-developed.   HENT:      Head: Normocephalic and atraumatic.   Cardiovascular:      Rate and Rhythm: Normal rate and regular rhythm.      Heart sounds: Normal heart sounds.   Pulmonary:      Effort: Pulmonary effort is normal.      Breath sounds: Normal breath sounds.       Assessment:       1. Essential hypertension    2. Hyperlipidemia, unspecified hyperlipidemia type    3. Type 2 diabetes mellitus with hyperlipidemia    4. Elevated PSA    5. Screening for malignant neoplasm of prostate    6. Ulcerative proctitis without complication    7. Hypertension associated with diabetes          Plan:       Essential hypertension    Hyperlipidemia, unspecified hyperlipidemia type    Type 2 diabetes mellitus with hyperlipidemia  -     CBC Without Differential; Future; Expected date: 03/13/2023  -     Comprehensive Metabolic Panel; Future; Expected date: 03/13/2023  -     Hemoglobin A1C; Future; Expected date: 03/13/2023  -     Lipid Panel; Future; Expected date: 03/13/2023  -     Microalbumin/Creatinine Ratio, Urine; Future; Expected date: 03/13/2023  -     TSH; Future; Expected date: 03/13/2023  -      glipiZIDE (GLUCOTROL) 10 MG tablet; Take 1 tablet (10 mg total) by mouth once daily.  Dispense: 90 tablet; Refill: 3  -     insulin (LANTUS SOLOSTAR U-100 INSULIN) glargine 100 units/mL SubQ pen; INJECT 18 UNITS SUBCUTANEOUSLY ONCE DAILY  Dispense: 30 mL; Refill: 3    Elevated PSA  -     PSA, Screening; Future; Expected date: 03/13/2023    Screening for malignant neoplasm of prostate  -     PSA, Screening; Future; Expected date: 03/13/2023    Ulcerative proctitis without complication    Hypertension associated with diabetes  -     glipiZIDE (GLUCOTROL) 10 MG tablet; Take 1 tablet (10 mg total) by mouth once daily.  Dispense: 90 tablet; Refill: 3      Htn sable  Lipid stable  Recheck psa and f/u with urology as planned/needed  Will monitor chronic medical issues and continue current plan of care.  Update labs and health maintenance    Follow up in about 6 months (around 9/13/2023), or if symptoms worsen or fail to improve.

## 2023-03-22 ENCOUNTER — TELEPHONE (OUTPATIENT)
Dept: FAMILY MEDICINE | Facility: CLINIC | Age: 78
End: 2023-03-22
Payer: MEDICARE

## 2023-03-22 DIAGNOSIS — E78.5 TYPE 2 DIABETES MELLITUS WITH HYPERLIPIDEMIA: Primary | ICD-10-CM

## 2023-03-22 DIAGNOSIS — E11.69 TYPE 2 DIABETES MELLITUS WITH HYPERLIPIDEMIA: Primary | ICD-10-CM

## 2023-04-13 ENCOUNTER — CLINICAL SUPPORT (OUTPATIENT)
Dept: DIABETES | Facility: CLINIC | Age: 78
End: 2023-04-13
Payer: MEDICARE

## 2023-04-13 VITALS — BODY MASS INDEX: 25.33 KG/M2 | WEIGHT: 176.94 LBS | HEIGHT: 70 IN

## 2023-04-13 DIAGNOSIS — E78.5 TYPE 2 DIABETES MELLITUS WITH HYPERLIPIDEMIA: ICD-10-CM

## 2023-04-13 DIAGNOSIS — E11.69 TYPE 2 DIABETES MELLITUS WITH HYPERLIPIDEMIA: ICD-10-CM

## 2023-04-13 PROCEDURE — 99999 PR PBB SHADOW E&M-EST. PATIENT-LVL I: ICD-10-PCS | Mod: PBBFAC,HCNC,, | Performed by: DIETITIAN, REGISTERED

## 2023-04-13 PROCEDURE — G0108 PR DIAB MANAGE TRN  PER INDIV: ICD-10-PCS | Mod: HCNC,S$GLB,, | Performed by: DIETITIAN, REGISTERED

## 2023-04-13 PROCEDURE — G0108 DIAB MANAGE TRN  PER INDIV: HCPCS | Mod: HCNC,S$GLB,, | Performed by: DIETITIAN, REGISTERED

## 2023-04-13 PROCEDURE — 99999 PR PBB SHADOW E&M-EST. PATIENT-LVL I: CPT | Mod: PBBFAC,HCNC,, | Performed by: DIETITIAN, REGISTERED

## 2023-04-13 NOTE — PROGRESS NOTES
Diabetes Care Specialist Progress Note  Author: Alessia Padgett RD  Date: 4/13/2023    Program Intake  Reason for Diabetes Program Visit:: Initial Diabetes Assessment  Current diabetes risk level:: low  In the last 12 months, have you:: none  Permission to speak with others about care:: yes (wife alayna)    Lab Results   Component Value Date    HGBA1C 7.8 (H) 03/13/2023       Clinical  Problem Review  Reviewed Problem List with Patient: yes  Reviewed health maintenance: yes    Clinical Assessment  Current Diabetes Treatment: Oral Medication, Insulin (Glipizide 10 mg, 18 units Lantus)  Have you ever experienced hypoglycemia (low blood sugar)?: yes  In the last month, how often have you experienced low blood sugar?: other (see comments) (gets shaky then eats and feels better.  Does not confirm possible hypoglycemia.)  Are you able to tell when your blood sugar is low?: Yes  What symptoms do you experience?: Shaky  Have you ever been hospitalized because your blood sugar was too low?: no  How do you treat hypoglycemia (low blood sugar)?: other (eats meal)  Have you ever experienced hyperglycemia (high blood sugar)?: yes  In the last month, how often have you experienced high blood sugar?: other (see comments) (First diagnosed when in his 60s)  Are you able to tell when your blood sugar is high?: No (comment)  Have you ever been hospitalized because your blood sugar was high?: no    Medication Information  How do you obtain your medications?: Patient drives  How many days a week do you miss your medications?: Never  Do you use a pill box or medication chart to help you manage your medications?: No  Do you sometimes have difficulty refilling your medications?: No  Medication adherence impacting ability to self-manage diabetes?: No    Labs  Do you have regular lab work to monitor your medications?: Yes  Type of Regular Lab Work: A1c  Where do you get your labs drawn?: [facility]  Lab Compliance Barriers: No    Nutritional  Status  Diet: Regular  Meal Plan 24 Hour Recall: Breakfast, Lunch, Dinner, Snack (Drinks sparkling Ice drink.  Drinks 2-3 screwdrivers when he goes out drinking)  Meal Plan 24 Hour Recall - Breakfast: Frosted Mini Wheats  Meal Plan 24 Hour Recall - Lunch: Hormel shelf stable Complement meals  Meal Plan 24 Hour Recall - Dinner: protein + vegetable + side dish  Meal Plan 24 Hour Recall - Snack: 3 Sandies cookies and milk.  Change in appetite?: No  Dentation:: Intact  Recent Changes in Weight: No Recent Weight Change  Current nutritional status an area of need that is impacting patient's ability to self-manage diabetes?: No    Additional Social History    Support  Does anyone support you with your diabetes care?: yes  Who supports you?: self, spouse  Who takes you to your medical appointments?: self  Does the current support meet the patient's needs?: Yes  Is Support an area impacting ability to self-manage diabetes?: No    Access to Mass Media & Technology  Does the patient have access to any of the following devices or technologies?: Smart phone, Internet Access  Media or technology needs impacting ability to self-manage diabetes?: No    Cognitive/Behavioral Health  Alert and Oriented: Yes  Difficulty Thinking: No  Requires Prompting: No  Requires assistance for routine expression?: No  Cognitive or behavioral barriers impacting ability to self-manage diabetes?: No    Culture/Baptist  Culture or Sikh beliefs that may impact ability to access healthcare: No    Communication  Language preference: English  Hearing Problems: Yes  Vision Problems: No  Communication needs impacting ability to self-manage diabetes?: No    Health Literacy  Preferred Learning Method: Face to Face, Reading Materials  Health literacy needs impacting ability to self-manage diabetes?: No      Diabetes Self-Management Skills Assessment    Diabetes Disease Process/Treatment Options  Patient/caregiver able to state what happens when someone  has diabetes.: yes  Patient/caregiver knows what type of diabetes they have.: yes  Diabetes Type : Type II  Patient/caregiver able to identify at least three signs and symptoms of diabetes.: no  Patient able to identify at least three risk factors for diabetes.: no  Diabetes Disease Process/Treatment Options: Skills Assessment Completed: Yes  Assessment indicates:: Adequate understanding  Area of need?: No    Nutrition/Healthy Eating  Challenges to healthy eating:: eating out, going to parties, snacking between meals and at night  Method of carbohydrate measurement:: no method  Patient can identify foods that impact blood sugar.: yes  Patient-identified foods:: sweets, soda, starches (bread, pasta, rice, cereal)  Nutrition/Healthy Eating Skills Assessment Completed:: Yes  Assessment indicates:: Instruction Needed  Area of need?: Yes    Physical Activity/Exercise  Patient's daily activity level:: moderately active  Patient formally exercises outside of work.: yes  Exercise Type: using exercise equipment  Intensity: Moderate  Frequency: three times a week  Duration: 30 min  Patient can identify forms of physical activity.: yes  Stated forms of physical activity:: housework, any movement performed by muscles that uses energy, moving to burn calories, yardwork  Patient can identify reasons why exercise/physical activity is important in diabetes management.: no  Physical Activity/Exercise Skills Assessment Completed: : Yes  Assessment indicates:: Adequate understanding  Area of need?: No    Medications  Patient is able to describe current diabetes management routine.: yes  Diabetes management routine:: oral medications, insulin  Patient is able to identify current diabetes medications, dosages, and appropriate timing of medications.: no  Patient understands the purpose of the medications taken for diabetes.: yes  Patient reports problems or concerns with current medication regimen.: no  Medication Skills Assessment  Completed:: Yes  Assessment indicates:: Adequate understanding  Area of need?: No    Home Blood Glucose Monitoring  Patient states that blood sugar is checked at home daily.: no  Reasons for not monitoring:: other (see comments)  Home Blood Glucose Monitoring Skills Assessment Completed: : Yes  Assessment indicates:: Instruction Needed  Area of need?: Yes    Acute Complications  Patient is able to identify types of acute complications: Yes  Patient Identified:: Hypoglycemia  Patient is able to state the basic meaning of hypoglycemia?: Yes  Able to state the blood sugar range for hypoglycemia?: no (see comments)  Patient can identify general symptoms of hypoglycemia: yes  Patient identified:: shakiness  Able to state proper treatment of hypoglycemia?: no (see comments)  Acute Complications Skills Assessment Completed: : Yes  Assessment indicates:: Instruction Needed  Area of need?: Yes    Chronic Complications  Patient can identify major chronic complications of diabetes.: yes  Stated chronic complications:: neuropathy/nerve damage, heart disease/heart attack  Patient can identify ways to prevent or delay diabetes complications.: yes  Stated ways to prevent complications:: controlling blood sugar, healthy eating and regular activity  Patient is aware that having diabetes increases risk of heart disease?: Yes  Patient is aware that heart disease is the leading cause of death and disability in people with diabetes?: No  Patient able to state risk factors for heart disease?: Yes  Patient stated risk factors for heart disease:: High blood pressure, High cholesterol, Diet  Patient is taking statin?: Yes (atorvastatin 80 mg)  Chronic Complications Skills Assessment Completed: : Yes  Assessment indicates:: Adequate understanding  Area of need?: No    Psychosocial/Coping  Patient can identify ways of coping with chronic disease.: yes  Patient-stated ways of coping with chronic disease:: support from loved  ones  Psychosocial/Coping Skills Assessment Completed: : No  Deffered due to:: Time  Area of need?: No    Assessment Summary and Plan    Based on today's diabetes care assessment, the following areas of need were identified:      Social 4/13/2023   Support No   Access to Mass Media/Tech No   Cognitive/Behavioral Health No   Culture/Rastafari No   Communication No   Health Literacy No        Clinical 4/13/2023   Medication Adherence No   Lab Compliance No   Nutritional Status No        Diabetes Self-Management Skills 4/13/2023   Diabetes Disease Process/Treatment Options No   Nutrition/Healthy Eating Yes, see care plan   Physical Activity/Exercise No   Medication Take glipizide before breakfast.   Home Blood Glucose Monitoring Yes, once daily rotating between before breakfast and before dinner.   Acute Complications Yes, rule of 15/15, S/S   Chronic Complications No   Psychosocial/Coping No          Today's interventions were provided through individual discussion, instruction, and written materials were provided.      Patient verbalized understanding of instruction and written materials.  Pt was able to return back demonstration of instructions today. Patient understood key points, needs reinforcement and further instruction.     Diabetes Self-Management Care Plan:    Today's Diabetes Self-Management Care Plan was developed with Tres's input. Tres has agreed to work toward the following goal(s) to improve his/her overall diabetes control.      Care Plan: Diabetes Management   Updates made since 3/14/2023 12:00 AM        Problem: Blood Glucose Self-Monitoring         Long-Range Goal: Patient agrees to check and record blood sugars 1 times per day rotating between before breakfast and before dinner.    Start Date: 4/13/2023   Expected End Date: 9/21/2023   Priority: Low   Barriers: Pain Associated with Finger Sticks   Note:    Asked about CGM. Explained it depends on insurance coverage.       Task: Provided patient  with a meter today and sent Rx request to provider to send to patients pharmacy.         Task: Reviewed the importance of self-monitoring blood glucose and keeping logs. Completed 4/13/2023        Task: Instructed on how to self-monitor blood glucose using a home glucometer, how to properly dispose of used strips and lancets after use, and how to appropriately store meter and supplies.         Task: Provided patient with blood glucose logs, reviewed appropriate timing and frequency to SMBG, education on parameters on when to notify provider and advised patient to bring logs to all appts with PCP/Endocrinologist/Diabetes Care Specialist. Completed 4/13/2023        Task: Discussed ways to minimize pain when monitoring blood glucose. Completed 4/13/2023        Problem: Acute Complications         Long-Range Goal: Patient agrees to identify and manage signs and symptoms of high/low blood sugar (hyper/hypoglycemia) by keeping a log of events and using proper treatment.    Start Date: 4/13/2023   Expected End Date: 9/21/2023   Priority: Medium   Barriers: No Barriers Identified   Note:    Patient drinks alcohol without eating and takes Glipizide after breakfast.  Explained how these could lead to hypoglycemia, how to treat with the rule of 15/15, S/S, and how to prevent occurrence.       Task: Reviewed proper treatment of hypoglycemia with the rule of 15--patient to eat 15g simple carbohydrate (4 glucose tablets, 1 glucose gel, 5 pieces hard candy, ½ cup fruit juice, ½ can regular soda, etc) and wait 15 minutes and recheck home glucose. Completed 4/13/2023        Task: Reviewed common causes and precautions to help prevent hypoglycemic events. Completed 4/13/2023        Task: Reviewed signs and symptoms of hypoglycemia, what range is considered to be hypoglycemia, and when to seek further medical attention. Completed 4/13/2023        Task: Discussed, sick day planning, natural disaster planning, and/or travel planning  to prevent hyper/hypoglycemia.         Task: Discussed risk factors for developing diabetic ketoacidosis (DKA), strategies for reducing risk, testing with ketone test strips if BG is >240mg/dl, basic protocol for managing DKA, and when to seek further medical attention.         Problem: Medications         Long-Range Goal: Take 10 mg glipizide before breakfast.  Take 18 units of Lantus same time each day.    Start Date: 4/13/2023   Expected End Date: 9/21/2023   Priority: High   Barriers: No Barriers Identified        Task: Reviewed with patient all current diabetes medications and provided basic review of the purpose, dosage, frequency, side effects, and storage of both oral and injectable diabetes medications. Completed 4/13/2023        Task: Reviewed possible resources for acquiring cost prohibitive medication.         Task: Discussed guidelines for preventing, detecting and treating hypoglycemia and hyperglycemia and reviewed the importance of meal and medication timing with diabetes mediations for prevention of hypoglycemia and maximum drug benefit. Completed 4/13/2023        Problem: Healthy Eating         Long-Range Goal: Eat 3 meals daily with 45-60g/3-4 servings of Carbohydrate per meal and 15 g carbohydrate paired with protein as snacks.    Start Date: 4/13/2023   Expected End Date: 9/21/2023   Priority: Medium   Barriers: No Barriers Identified   Note:    Wife cooks healthy meals but he likes to snack on desserts with milk and drinks alcohol with mixers made of sugar.        Task: Reviewed the sources and role of Carbohydrate, Protein, and Fat and how each nutrient impacts blood sugar. Completed 4/13/2023        Task: Provided visual examples using dry measuring cups, food models, and other familiar objects such as computer mouse, deck or cards, tennis ball etc. to help with visualization of portions. Completed 4/13/2023        Task: Explained how to count carbohydrates using the food label and the use of  dry measuring cups for accurate carb counting. Completed 4/13/2023        Task: Discussed strategies for choosing healthier menu options when dining out. Completed 4/13/2023        Task: Recommended replacing beverages containing high sugar content with noncaloric/sugar free options and/or water. Completed 4/13/2023        Task: Review the importance of balancing carbohydrates with each meal using portion control techniques to count servings of carbohydrate and label reading to identify serving size and amount of total carbs per serving. Completed 4/13/2023        Task: Provided Sample plate method and reviewed the use of the plate to estimate amounts of carbohydrate per meal. Completed 4/13/2023          Follow Up Plan     Follow up in 5 months (on 9/13/2023).  Patient declined 2 and 3 month follow up offered by this diabetes educator instead asking for follow up after he gets his next HgA1C.  Patient has follow up with Dr. Torres on 9/14/23.  Patient is taking Glipizide after breakfast.  Educated on proper timing of this medication.  Patient's dietary challenges are 2-3 screw  alcoholic drinks and desserts with milk. Explained to patient the importance of consuming alcohol with meals and to not have sugar in mixers.  Discussed carbohydrate guidelines for meals and snacks.  Taught patient S/S hypoglycemia and rule of 15/15.      Patient desires tight glucose management.  Explained to him that we do not want to risk hypoglycemic in the name of tighter blood glucose control when patient was asking for a goal of under 6.5%.  Patient stated goal set by PCP was under 8%.  Explained that at his age we are more concerned about hypoglycemia. Explained I agreed with his PCP. He can improve some dietary habits which would be beneficial to overall health so goal set less than 7.5% with no hypoglycemia.      Today's care plan and follow up schedule was discussed with patient.  Tres verbalized understanding of the care  plan, goals, and agrees to follow up plan.        The patient was encouraged to communicate with his/her health care provider/physician and care team regarding his/her condition(s) and treatment.  I provided the patient with my contact information today and encouraged to contact me via phone or [facility] as needed.     Length of Visit   Total Time: 60 Minutes

## 2023-04-28 ENCOUNTER — OFFICE VISIT (OUTPATIENT)
Dept: UROLOGY | Facility: CLINIC | Age: 78
End: 2023-04-28
Payer: MEDICARE

## 2023-04-28 VITALS — BODY MASS INDEX: 25.13 KG/M2 | WEIGHT: 175.5 LBS | HEIGHT: 70 IN

## 2023-04-28 DIAGNOSIS — N40.0 BPH WITHOUT URINARY OBSTRUCTION: ICD-10-CM

## 2023-04-28 DIAGNOSIS — R97.20 ELEVATED PSA: Primary | ICD-10-CM

## 2023-04-28 PROCEDURE — 99999 PR PBB SHADOW E&M-EST. PATIENT-LVL III: ICD-10-PCS | Mod: PBBFAC,HCNC,, | Performed by: UROLOGY

## 2023-04-28 PROCEDURE — 99213 PR OFFICE/OUTPT VISIT, EST, LEVL III, 20-29 MIN: ICD-10-PCS | Mod: HCNC,S$GLB,, | Performed by: UROLOGY

## 2023-04-28 PROCEDURE — 99213 OFFICE O/P EST LOW 20 MIN: CPT | Mod: HCNC,S$GLB,, | Performed by: UROLOGY

## 2023-04-28 PROCEDURE — 1101F PT FALLS ASSESS-DOCD LE1/YR: CPT | Mod: HCNC,CPTII,S$GLB, | Performed by: UROLOGY

## 2023-04-28 PROCEDURE — 1126F PR PAIN SEVERITY QUANTIFIED, NO PAIN PRESENT: ICD-10-PCS | Mod: HCNC,CPTII,S$GLB, | Performed by: UROLOGY

## 2023-04-28 PROCEDURE — 1159F MED LIST DOCD IN RCRD: CPT | Mod: HCNC,CPTII,S$GLB, | Performed by: UROLOGY

## 2023-04-28 PROCEDURE — 3288F FALL RISK ASSESSMENT DOCD: CPT | Mod: HCNC,CPTII,S$GLB, | Performed by: UROLOGY

## 2023-04-28 PROCEDURE — 1101F PR PT FALLS ASSESS DOC 0-1 FALLS W/OUT INJ PAST YR: ICD-10-PCS | Mod: HCNC,CPTII,S$GLB, | Performed by: UROLOGY

## 2023-04-28 PROCEDURE — 1126F AMNT PAIN NOTED NONE PRSNT: CPT | Mod: HCNC,CPTII,S$GLB, | Performed by: UROLOGY

## 2023-04-28 PROCEDURE — 99999 PR PBB SHADOW E&M-EST. PATIENT-LVL III: CPT | Mod: PBBFAC,HCNC,, | Performed by: UROLOGY

## 2023-04-28 PROCEDURE — 3288F PR FALLS RISK ASSESSMENT DOCUMENTED: ICD-10-PCS | Mod: HCNC,CPTII,S$GLB, | Performed by: UROLOGY

## 2023-04-28 PROCEDURE — 1159F PR MEDICATION LIST DOCUMENTED IN MEDICAL RECORD: ICD-10-PCS | Mod: HCNC,CPTII,S$GLB, | Performed by: UROLOGY

## 2023-04-28 NOTE — PROGRESS NOTES
Subjective:       Patient ID: Tres Peace is a 77 y.o. male.    Chief Complaint: annual check up    HPI    77-year-old with elevated PSA.  His most recent PSA is 5.0.  It has been overall stable for the last 3 years.  He has no family history of prostate cancer.  He has no bothersome urinary symptoms.  He denies hematuria and dysuria.  He takes no BPH medications.  We again discussed screening PSA and its limitation.  We discussed the possibility of early stage prostate cancer.  We discussed prostate needle biopsy.  Discussed the utility of a prostate MRI     Component PSA, Screen   Latest Ref Rng & Units 0.00 - 4.00 ng/mL   3/13/2023 5.4 (H)   3/10/2022 5.0 (H)   9/9/2021 4.1 (H)   6/9/2020 1.4   5/28/2019 4.8 (H)   6/12/2018 2.5   6/16/2017 2.8   3/21/2016 2.1   6/23/2014 1.6       Review of Systems   Constitutional:  Negative for fever.   Genitourinary:  Negative for dysuria and hematuria.     Objective:      Physical Exam  Vitals reviewed.   Constitutional:       Appearance: He is well-developed.   Pulmonary:      Effort: Pulmonary effort is normal.   Genitourinary:     Prostate: Enlarged (60g). Not tender and no nodules present.   Skin:     Findings: No rash.   Neurological:      Mental Status: He is alert and oriented to person, place, and time.       Assessment:       1. Elevated PSA    2. BPH without urinary obstruction        Plan:       Elevated PSA    BPH without urinary obstruction      Annual follow-up with PSA

## 2023-06-16 ENCOUNTER — TELEPHONE (OUTPATIENT)
Dept: DIABETES | Facility: CLINIC | Age: 78
End: 2023-06-16
Payer: MEDICARE

## 2023-06-16 NOTE — TELEPHONE ENCOUNTER
Called patient to reschedule appointment due to clinic schedule change. Patient now no longer wants to be scheduled for DE F/U until he receives updated A1C.  No new appt scheduled per patient request. Instructed patient to reach out on [email or web address] to reschedule.

## 2023-07-05 ENCOUNTER — PES CALL (OUTPATIENT)
Dept: ADMINISTRATIVE | Facility: CLINIC | Age: 78
End: 2023-07-05
Payer: MEDICARE

## 2023-09-26 ENCOUNTER — PATIENT MESSAGE (OUTPATIENT)
Dept: FAMILY MEDICINE | Facility: CLINIC | Age: 78
End: 2023-09-26
Payer: MEDICARE

## 2023-09-27 DIAGNOSIS — E11.69 TYPE 2 DIABETES MELLITUS WITH HYPERLIPIDEMIA: Primary | ICD-10-CM

## 2023-09-27 DIAGNOSIS — E78.5 TYPE 2 DIABETES MELLITUS WITH HYPERLIPIDEMIA: Primary | ICD-10-CM

## 2023-10-01 DIAGNOSIS — K21.9 GASTROESOPHAGEAL REFLUX DISEASE: ICD-10-CM

## 2023-10-01 DIAGNOSIS — I10 ESSENTIAL HYPERTENSION: ICD-10-CM

## 2023-10-01 RX ORDER — OMEPRAZOLE 40 MG/1
CAPSULE, DELAYED RELEASE ORAL
Qty: 90 CAPSULE | Refills: 1 | Status: SHIPPED | OUTPATIENT
Start: 2023-10-01

## 2023-10-01 RX ORDER — LOSARTAN POTASSIUM 100 MG/1
TABLET ORAL
Qty: 90 TABLET | Refills: 1 | Status: SHIPPED | OUTPATIENT
Start: 2023-10-01

## 2023-10-01 NOTE — TELEPHONE ENCOUNTER
Provider Staff:  Action required for this patient     Please see care gap opportunities below in Care Due Message.    Thanks!  [facility]     Appointments      Date Provider   Last Visit   3/13/2023 LONI Torres MD   Next Visit   10/18/2023 LONI Torres MD     Refill Decision Note   Tres Peace  is requesting a refill authorization.  Brief Assessment and Rationale for Refill:  Approve     Medication Therapy Plan:         Comments:     Note composed:4:16 PM 10/01/2023

## 2023-10-01 NOTE — TELEPHONE ENCOUNTER
Care Due:                  Date            Visit Type   Department     Provider  --------------------------------------------------------------------------------                                EP -                              [facility]  Last Visit: 03-      CARE ([facility])   RAVI MAURICE                              EP -                              PRIMARY      [facility]  Next Visit: 10-      CARE ([facility])   RAVI MAURICE                                                            Last  Test          Frequency    Reason                     Performed    Due Date  --------------------------------------------------------------------------------    HBA1C.......  6 months...  glipiZIDE, insulin.......  03-   09-    [facility] Embedded Care Due Messages. Reference number: [id number].   10/01/2023 3:31:25 AM CDT

## 2023-10-18 ENCOUNTER — OFFICE VISIT (OUTPATIENT)
Dept: FAMILY MEDICINE | Facility: CLINIC | Age: 78
End: 2023-10-18
Payer: MEDICARE

## 2023-10-18 VITALS
WEIGHT: 175.25 LBS | HEIGHT: 70 IN | BODY MASS INDEX: 25.09 KG/M2 | SYSTOLIC BLOOD PRESSURE: 120 MMHG | OXYGEN SATURATION: 96 % | DIASTOLIC BLOOD PRESSURE: 76 MMHG | HEART RATE: 89 BPM

## 2023-10-18 DIAGNOSIS — I10 ESSENTIAL HYPERTENSION: ICD-10-CM

## 2023-10-18 DIAGNOSIS — E11.69 TYPE 2 DIABETES MELLITUS WITH HYPERLIPIDEMIA: ICD-10-CM

## 2023-10-18 DIAGNOSIS — Z00.00 WELLNESS EXAMINATION: Primary | ICD-10-CM

## 2023-10-18 DIAGNOSIS — E78.5 TYPE 2 DIABETES MELLITUS WITH HYPERLIPIDEMIA: ICD-10-CM

## 2023-10-18 DIAGNOSIS — E78.2 MIXED HYPERLIPIDEMIA: ICD-10-CM

## 2023-10-18 PROCEDURE — 1159F MED LIST DOCD IN RCRD: CPT | Mod: HCNC,CPTII,S$GLB, | Performed by: FAMILY MEDICINE

## 2023-10-18 PROCEDURE — 1159F PR MEDICATION LIST DOCUMENTED IN MEDICAL RECORD: ICD-10-PCS | Mod: HCNC,CPTII,S$GLB, | Performed by: FAMILY MEDICINE

## 2023-10-18 PROCEDURE — 90694 VACC AIIV4 NO PRSRV 0.5ML IM: CPT | Mod: HCNC,S$GLB,, | Performed by: FAMILY MEDICINE

## 2023-10-18 PROCEDURE — 90694 FLU VACCINE - QUADRIVALENT - ADJUVANTED: ICD-10-PCS | Mod: HCNC,S$GLB,, | Performed by: FAMILY MEDICINE

## 2023-10-18 PROCEDURE — 99999 PR PBB SHADOW E&M-EST. PATIENT-LVL III: CPT | Mod: PBBFAC,HCNC,, | Performed by: FAMILY MEDICINE

## 2023-10-18 PROCEDURE — 3078F DIAST BP <80 MM HG: CPT | Mod: HCNC,CPTII,S$GLB, | Performed by: FAMILY MEDICINE

## 2023-10-18 PROCEDURE — 3078F PR MOST RECENT DIASTOLIC BLOOD PRESSURE < 80 MM HG: ICD-10-PCS | Mod: HCNC,CPTII,S$GLB, | Performed by: FAMILY MEDICINE

## 2023-10-18 PROCEDURE — 3074F SYST BP LT 130 MM HG: CPT | Mod: HCNC,CPTII,S$GLB, | Performed by: FAMILY MEDICINE

## 2023-10-18 PROCEDURE — 99999 PR PBB SHADOW E&M-EST. PATIENT-LVL III: ICD-10-PCS | Mod: PBBFAC,HCNC,, | Performed by: FAMILY MEDICINE

## 2023-10-18 PROCEDURE — 99397 PER PM REEVAL EST PAT 65+ YR: CPT | Mod: HCNC,S$GLB,, | Performed by: FAMILY MEDICINE

## 2023-10-18 PROCEDURE — 1126F PR PAIN SEVERITY QUANTIFIED, NO PAIN PRESENT: ICD-10-PCS | Mod: HCNC,CPTII,S$GLB, | Performed by: FAMILY MEDICINE

## 2023-10-18 PROCEDURE — 99397 PR PREVENTIVE VISIT,EST,65 & OVER: ICD-10-PCS | Mod: HCNC,S$GLB,, | Performed by: FAMILY MEDICINE

## 2023-10-18 PROCEDURE — 3074F PR MOST RECENT SYSTOLIC BLOOD PRESSURE < 130 MM HG: ICD-10-PCS | Mod: HCNC,CPTII,S$GLB, | Performed by: FAMILY MEDICINE

## 2023-10-18 PROCEDURE — 1126F AMNT PAIN NOTED NONE PRSNT: CPT | Mod: HCNC,CPTII,S$GLB, | Performed by: FAMILY MEDICINE

## 2023-10-18 PROCEDURE — G0008 ADMIN INFLUENZA VIRUS VAC: HCPCS | Mod: HCNC,S$GLB,, | Performed by: FAMILY MEDICINE

## 2023-10-18 PROCEDURE — G0008 FLU VACCINE - QUADRIVALENT - ADJUVANTED: ICD-10-PCS | Mod: HCNC,S$GLB,, | Performed by: FAMILY MEDICINE

## 2023-10-18 NOTE — PROGRESS NOTES
Subjective:       Patient ID: Tres Peace is a 77 y.o. male.    Chief Complaint: Follow-up    Here for wellness and follow up multiple chronic medical issues. Doing well overall and in normal state of health.  Due for labs.    Follow-up  Pertinent negatives include no chest pain, chills, coughing or fever.     Review of Systems   Constitutional:  Negative for chills and fever.   Respiratory:  Negative for cough, chest tightness and shortness of breath.    Cardiovascular:  Negative for chest pain, palpitations and leg swelling.   Endocrine: Negative for cold intolerance and heat intolerance.   Psychiatric/Behavioral:  Negative for decreased concentration. The patient is not nervous/anxious.        Objective:      Physical Exam  Vitals and nursing note reviewed.   Constitutional:       Appearance: He is well-developed.   HENT:      Head: Normocephalic and atraumatic.   Cardiovascular:      Rate and Rhythm: Normal rate and regular rhythm.      Heart sounds: Normal heart sounds.   Pulmonary:      Effort: Pulmonary effort is normal.      Breath sounds: Normal breath sounds.         Assessment:       1. Wellness examination    2. Mixed hyperlipidemia    3. Essential hypertension    4. Type 2 diabetes mellitus with hyperlipidemia        Plan:       Wellness examination    Mixed hyperlipidemia    Essential hypertension    Type 2 diabetes mellitus with hyperlipidemia    Get labs that were ordered  Lipid stable  Htn stable   Diabetes stable and he feels improved  Will monitor chronic medical issues and continue current plan of care.        Follow up in about 6 months (around 4/18/2024), or if symptoms worsen or fail to improve.

## 2023-10-19 ENCOUNTER — LAB VISIT (OUTPATIENT)
Dept: LAB | Facility: HOSPITAL | Age: 78
End: 2023-10-19
Attending: FAMILY MEDICINE
Payer: MEDICARE

## 2023-10-19 DIAGNOSIS — E11.69 TYPE 2 DIABETES MELLITUS WITH HYPERLIPIDEMIA: ICD-10-CM

## 2023-10-19 DIAGNOSIS — E78.5 TYPE 2 DIABETES MELLITUS WITH HYPERLIPIDEMIA: ICD-10-CM

## 2023-10-19 LAB
ALBUMIN SERPL BCP-MCNC: 3.9 G/DL (ref 3.5–5.2)
ALBUMIN/CREAT UR: 3.9 UG/MG (ref 0–30)
ALP SERPL-CCNC: 124 U/L (ref 55–135)
ALT SERPL W/O P-5'-P-CCNC: 30 U/L (ref 10–44)
ANION GAP SERPL CALC-SCNC: 11 MMOL/L (ref 8–16)
AST SERPL-CCNC: 28 U/L (ref 10–40)
BILIRUB SERPL-MCNC: 0.6 MG/DL (ref 0.1–1)
BUN SERPL-MCNC: 11 MG/DL (ref 8–23)
CALCIUM SERPL-MCNC: 8.7 MG/DL (ref 8.7–10.5)
CHLORIDE SERPL-SCNC: 105 MMOL/L (ref 95–110)
CHOLEST SERPL-MCNC: 137 MG/DL (ref 120–199)
CHOLEST/HDLC SERPL: 3.6 {RATIO} (ref 2–5)
CO2 SERPL-SCNC: 25 MMOL/L (ref 23–29)
CREAT SERPL-MCNC: 0.9 MG/DL (ref 0.5–1.4)
CREAT UR-MCNC: 178 MG/DL (ref 23–375)
ERYTHROCYTE [DISTWIDTH] IN BLOOD BY AUTOMATED COUNT: 13.2 % (ref 11.5–14.5)
EST. GFR  (NO RACE VARIABLE): >60 ML/MIN/1.73 M^2
ESTIMATED AVG GLUCOSE: 171 MG/DL (ref 68–131)
GLUCOSE SERPL-MCNC: 122 MG/DL (ref 70–110)
HBA1C MFR BLD: 7.6 % (ref 4–5.6)
HCT VFR BLD AUTO: 42.6 % (ref 40–54)
HDLC SERPL-MCNC: 38 MG/DL (ref 40–75)
HDLC SERPL: 27.7 % (ref 20–50)
HGB BLD-MCNC: 13.8 G/DL (ref 14–18)
LDLC SERPL CALC-MCNC: 82.8 MG/DL (ref 63–159)
MCH RBC QN AUTO: 29.2 PG (ref 27–31)
MCHC RBC AUTO-ENTMCNC: 32.4 G/DL (ref 32–36)
MCV RBC AUTO: 90 FL (ref 82–98)
MICROALBUMIN UR DL<=1MG/L-MCNC: 7 UG/ML
NONHDLC SERPL-MCNC: 99 MG/DL
PLATELET # BLD AUTO: 278 K/UL (ref 150–450)
PMV BLD AUTO: 10 FL (ref 9.2–12.9)
POTASSIUM SERPL-SCNC: 4 MMOL/L (ref 3.5–5.1)
PROT SERPL-MCNC: 7.2 G/DL (ref 6–8.4)
RBC # BLD AUTO: 4.73 M/UL (ref 4.6–6.2)
SODIUM SERPL-SCNC: 141 MMOL/L (ref 136–145)
TRIGL SERPL-MCNC: 81 MG/DL (ref 30–150)
TSH SERPL DL<=0.005 MIU/L-ACNC: 2.21 UIU/ML (ref 0.4–4)
WBC # BLD AUTO: 8.6 K/UL (ref 3.9–12.7)

## 2023-10-19 PROCEDURE — 83036 HEMOGLOBIN GLYCOSYLATED A1C: CPT | Mod: HCNC | Performed by: FAMILY MEDICINE

## 2023-10-19 PROCEDURE — 80053 COMPREHEN METABOLIC PANEL: CPT | Mod: HCNC | Performed by: FAMILY MEDICINE

## 2023-10-19 PROCEDURE — 36415 COLL VENOUS BLD VENIPUNCTURE: CPT | Mod: HCNC,PO | Performed by: FAMILY MEDICINE

## 2023-10-19 PROCEDURE — 85027 COMPLETE CBC AUTOMATED: CPT | Mod: HCNC | Performed by: FAMILY MEDICINE

## 2023-10-19 PROCEDURE — 84443 ASSAY THYROID STIM HORMONE: CPT | Mod: HCNC | Performed by: FAMILY MEDICINE

## 2023-10-19 PROCEDURE — 82043 UR ALBUMIN QUANTITATIVE: CPT | Mod: HCNC | Performed by: FAMILY MEDICINE

## 2023-10-19 PROCEDURE — 80061 LIPID PANEL: CPT | Mod: HCNC | Performed by: FAMILY MEDICINE

## 2023-10-30 ENCOUNTER — PATIENT MESSAGE (OUTPATIENT)
Dept: GASTROENTEROLOGY | Facility: CLINIC | Age: 78
End: 2023-10-30
Payer: MEDICARE

## 2023-10-30 ENCOUNTER — PATIENT MESSAGE (OUTPATIENT)
Dept: FAMILY MEDICINE | Facility: CLINIC | Age: 78
End: 2023-10-30
Payer: MEDICARE

## 2023-10-30 NOTE — TELEPHONE ENCOUNTER
Attempted to reach out to the pt in regards to helping him schedule an appt with a dermatologist. Pt did not answer phone and voicemail was left.

## 2023-12-20 ENCOUNTER — PATIENT MESSAGE (OUTPATIENT)
Dept: FAMILY MEDICINE | Facility: CLINIC | Age: 78
End: 2023-12-20
Payer: MEDICARE

## 2024-01-29 ENCOUNTER — PATIENT MESSAGE (OUTPATIENT)
Dept: FAMILY MEDICINE | Facility: CLINIC | Age: 79
End: 2024-01-29
Payer: MEDICARE

## 2024-01-29 DIAGNOSIS — J40 BRONCHITIS: ICD-10-CM

## 2024-01-29 DIAGNOSIS — E11.69 TYPE 2 DIABETES MELLITUS WITH HYPERLIPIDEMIA: ICD-10-CM

## 2024-01-29 DIAGNOSIS — E78.5 TYPE 2 DIABETES MELLITUS WITH HYPERLIPIDEMIA: ICD-10-CM

## 2024-01-29 NOTE — TELEPHONE ENCOUNTER
Care Due:                  Date            Visit Type   Department     Provider  --------------------------------------------------------------------------------                                EP -                              [facility]  Last Visit: 10-      CARE ([facility])   RAVI MAURICE                              EP -                              PRIMARY      [facility]  Next Visit: 04-      CARE ([facility])   RAVI MAURICE                                                            Last  Test          Frequency    Reason                     Performed    Due Date  --------------------------------------------------------------------------------    HBA1C.......  6 months...  glipiZIDE, insulin.......  10-   04-    [facility] Embedded Care Due Messages. Reference number: [id number].   1/29/2024 11:59:07 AM CST

## 2024-01-30 DIAGNOSIS — E11.69 TYPE 2 DIABETES MELLITUS WITH HYPERLIPIDEMIA: ICD-10-CM

## 2024-01-30 DIAGNOSIS — E78.5 TYPE 2 DIABETES MELLITUS WITH HYPERLIPIDEMIA: ICD-10-CM

## 2024-01-30 RX ORDER — FLUTICASONE FUROATE AND VILANTEROL 100; 25 UG/1; UG/1
POWDER RESPIRATORY (INHALATION)
Qty: 180 EACH | Refills: 3 | Status: SHIPPED | OUTPATIENT
Start: 2024-01-30

## 2024-01-30 RX ORDER — ATORVASTATIN CALCIUM 40 MG/1
TABLET, FILM COATED ORAL
Qty: 90 TABLET | Refills: 0 | OUTPATIENT
Start: 2024-01-30

## 2024-01-30 RX ORDER — ATORVASTATIN CALCIUM 40 MG/1
40 TABLET, FILM COATED ORAL DAILY
Qty: 90 TABLET | Refills: 2 | Status: SHIPPED | OUTPATIENT
Start: 2024-01-30

## 2024-01-30 NOTE — TELEPHONE ENCOUNTER
Refill Decision Note   Tres Peace  is requesting a refill authorization.  Brief Assessment and Rationale for Refill:  Quick Discontinue     Medication Therapy Plan:    Pharmacy is requesting new scripts for the following medications without required information, (sig/ frequency/qty/etc)      Medication Reconciliation Completed: No     Comments: Pharmacies have been requesting medications for patients without required information, (sig, frequency, qty, etc.). In addition, requests are sent for medication(s) pt. are currently not taking, and medications patients have never taken.    We have spoken to the pharmacies about these request types and advised their teams previously that we are unable to assess these New Script requests and require all details for these requests. This is a known issue and has been reported.     Note composed:3:43 PM 01/30/2024

## 2024-01-30 NOTE — TELEPHONE ENCOUNTER
No care due was identified.  [facility] Embedded Care Due Messages. Reference number: [id number].   1/30/2024 3:20:59 PM CST

## 2024-04-19 ENCOUNTER — LAB VISIT (OUTPATIENT)
Dept: LAB | Facility: HOSPITAL | Age: 79
End: 2024-04-19
Attending: FAMILY MEDICINE
Payer: MEDICARE

## 2024-04-19 ENCOUNTER — OFFICE VISIT (OUTPATIENT)
Dept: FAMILY MEDICINE | Facility: CLINIC | Age: 79
End: 2024-04-19
Payer: MEDICARE

## 2024-04-19 VITALS
WEIGHT: 171.75 LBS | TEMPERATURE: 98 F | BODY MASS INDEX: 24.64 KG/M2 | HEART RATE: 62 BPM | DIASTOLIC BLOOD PRESSURE: 82 MMHG | SYSTOLIC BLOOD PRESSURE: 132 MMHG | OXYGEN SATURATION: 93 %

## 2024-04-19 DIAGNOSIS — Z12.5 SCREENING FOR MALIGNANT NEOPLASM OF PROSTATE: ICD-10-CM

## 2024-04-19 DIAGNOSIS — I10 ESSENTIAL HYPERTENSION: ICD-10-CM

## 2024-04-19 DIAGNOSIS — E78.5 TYPE 2 DIABETES MELLITUS WITH HYPERLIPIDEMIA: Primary | ICD-10-CM

## 2024-04-19 DIAGNOSIS — E78.5 TYPE 2 DIABETES MELLITUS WITH HYPERLIPIDEMIA: ICD-10-CM

## 2024-04-19 DIAGNOSIS — E78.2 MIXED HYPERLIPIDEMIA: ICD-10-CM

## 2024-04-19 DIAGNOSIS — E11.69 TYPE 2 DIABETES MELLITUS WITH HYPERLIPIDEMIA: Primary | ICD-10-CM

## 2024-04-19 DIAGNOSIS — E11.69 TYPE 2 DIABETES MELLITUS WITH HYPERLIPIDEMIA: ICD-10-CM

## 2024-04-19 LAB
ALBUMIN SERPL BCP-MCNC: 3.9 G/DL (ref 3.5–5.2)
ALP SERPL-CCNC: 100 U/L (ref 55–135)
ALT SERPL W/O P-5'-P-CCNC: 18 U/L (ref 10–44)
ANION GAP SERPL CALC-SCNC: 10 MMOL/L (ref 8–16)
AST SERPL-CCNC: 21 U/L (ref 10–40)
BILIRUB SERPL-MCNC: 0.7 MG/DL (ref 0.1–1)
BUN SERPL-MCNC: 15 MG/DL (ref 8–23)
CALCIUM SERPL-MCNC: 9.6 MG/DL (ref 8.7–10.5)
CHLORIDE SERPL-SCNC: 106 MMOL/L (ref 95–110)
CHOLEST SERPL-MCNC: 134 MG/DL (ref 120–199)
CHOLEST/HDLC SERPL: 3.6 {RATIO} (ref 2–5)
CO2 SERPL-SCNC: 26 MMOL/L (ref 23–29)
COMPLEXED PSA SERPL-MCNC: 4.8 NG/ML (ref 0–4)
CREAT SERPL-MCNC: 0.9 MG/DL (ref 0.5–1.4)
ERYTHROCYTE [DISTWIDTH] IN BLOOD BY AUTOMATED COUNT: 13.6 % (ref 11.5–14.5)
EST. GFR  (NO RACE VARIABLE): >60 ML/MIN/1.73 M^2
ESTIMATED AVG GLUCOSE: 169 MG/DL (ref 68–131)
GLUCOSE SERPL-MCNC: 71 MG/DL (ref 70–110)
HBA1C MFR BLD: 7.5 % (ref 4–5.6)
HCT VFR BLD AUTO: 42.5 % (ref 40–54)
HDLC SERPL-MCNC: 37 MG/DL (ref 40–75)
HDLC SERPL: 27.6 % (ref 20–50)
HGB BLD-MCNC: 14 G/DL (ref 14–18)
LDLC SERPL CALC-MCNC: 83 MG/DL (ref 63–159)
MCH RBC QN AUTO: 29.9 PG (ref 27–31)
MCHC RBC AUTO-ENTMCNC: 32.9 G/DL (ref 32–36)
MCV RBC AUTO: 91 FL (ref 82–98)
NONHDLC SERPL-MCNC: 97 MG/DL
PLATELET # BLD AUTO: 221 K/UL (ref 150–450)
PMV BLD AUTO: 10.5 FL (ref 9.2–12.9)
POTASSIUM SERPL-SCNC: 4.3 MMOL/L (ref 3.5–5.1)
PROT SERPL-MCNC: 7.3 G/DL (ref 6–8.4)
RBC # BLD AUTO: 4.69 M/UL (ref 4.6–6.2)
SODIUM SERPL-SCNC: 142 MMOL/L (ref 136–145)
TRIGL SERPL-MCNC: 70 MG/DL (ref 30–150)
TSH SERPL DL<=0.005 MIU/L-ACNC: 1.55 UIU/ML (ref 0.4–4)
WBC # BLD AUTO: 8.69 K/UL (ref 3.9–12.7)

## 2024-04-19 PROCEDURE — 84443 ASSAY THYROID STIM HORMONE: CPT | Performed by: FAMILY MEDICINE

## 2024-04-19 PROCEDURE — 36415 COLL VENOUS BLD VENIPUNCTURE: CPT | Mod: PO | Performed by: FAMILY MEDICINE

## 2024-04-19 PROCEDURE — 3288F FALL RISK ASSESSMENT DOCD: CPT | Mod: CPTII,S$GLB,, | Performed by: FAMILY MEDICINE

## 2024-04-19 PROCEDURE — 99999 PR PBB SHADOW E&M-EST. PATIENT-LVL III: CPT | Mod: PBBFAC,,, | Performed by: FAMILY MEDICINE

## 2024-04-19 PROCEDURE — 1159F MED LIST DOCD IN RCRD: CPT | Mod: CPTII,S$GLB,, | Performed by: FAMILY MEDICINE

## 2024-04-19 PROCEDURE — G2211 COMPLEX E/M VISIT ADD ON: HCPCS | Mod: S$GLB,,, | Performed by: FAMILY MEDICINE

## 2024-04-19 PROCEDURE — 3075F SYST BP GE 130 - 139MM HG: CPT | Mod: CPTII,S$GLB,, | Performed by: FAMILY MEDICINE

## 2024-04-19 PROCEDURE — 3079F DIAST BP 80-89 MM HG: CPT | Mod: CPTII,S$GLB,, | Performed by: FAMILY MEDICINE

## 2024-04-19 PROCEDURE — 99214 OFFICE O/P EST MOD 30 MIN: CPT | Mod: S$GLB,,, | Performed by: FAMILY MEDICINE

## 2024-04-19 PROCEDURE — 85027 COMPLETE CBC AUTOMATED: CPT | Performed by: FAMILY MEDICINE

## 2024-04-19 PROCEDURE — 80061 LIPID PANEL: CPT | Performed by: FAMILY MEDICINE

## 2024-04-19 PROCEDURE — 1101F PT FALLS ASSESS-DOCD LE1/YR: CPT | Mod: CPTII,S$GLB,, | Performed by: FAMILY MEDICINE

## 2024-04-19 PROCEDURE — 80053 COMPREHEN METABOLIC PANEL: CPT | Performed by: FAMILY MEDICINE

## 2024-04-19 PROCEDURE — 1126F AMNT PAIN NOTED NONE PRSNT: CPT | Mod: CPTII,S$GLB,, | Performed by: FAMILY MEDICINE

## 2024-04-19 PROCEDURE — 84153 ASSAY OF PSA TOTAL: CPT | Performed by: FAMILY MEDICINE

## 2024-04-19 PROCEDURE — 83036 HEMOGLOBIN GLYCOSYLATED A1C: CPT | Performed by: FAMILY MEDICINE

## 2024-04-19 NOTE — PROGRESS NOTES
Subjective:       Patient ID: Tres Peace is a 78 y.o. male.    Chief Complaint: Follow-up    Here for follow up multiple chronic medical issues. Doing well overall and in normal state of health.      Follow-up  Pertinent negatives include no chest pain, chills, coughing or fever.     Review of Systems   Constitutional:  Negative for chills and fever.   Respiratory:  Negative for cough, chest tightness and shortness of breath.    Cardiovascular:  Negative for chest pain, palpitations and leg swelling.   Endocrine: Negative for cold intolerance and heat intolerance.   Psychiatric/Behavioral:  Negative for decreased concentration. The patient is not nervous/anxious.        Objective:      Physical Exam  Vitals and nursing note reviewed.   Constitutional:       Appearance: He is well-developed.   HENT:      Head: Normocephalic and atraumatic.   Cardiovascular:      Rate and Rhythm: Normal rate and regular rhythm.      Heart sounds: Normal heart sounds.   Pulmonary:      Effort: Pulmonary effort is normal.      Breath sounds: Normal breath sounds.   Psychiatric:         Mood and Affect: Mood normal.         Behavior: Behavior normal.         Assessment:       1. Type 2 diabetes mellitus with hyperlipidemia    2. Screening for malignant neoplasm of prostate    3. Mixed hyperlipidemia    4. Essential hypertension        Plan:       Type 2 diabetes mellitus with hyperlipidemia  -     CBC Without Differential; Future; Expected date: 04/19/2024  -     Comprehensive Metabolic Panel; Future; Expected date: 04/19/2024  -     Hemoglobin A1C; Future; Expected date: 04/19/2024  -     Lipid Panel; Future; Expected date: 04/19/2024  -     TSH; Future; Expected date: 04/19/2024    Screening for malignant neoplasm of prostate  -     PSA, Screening; Future; Expected date: 04/19/2024    Mixed hyperlipidemia    Essential hypertension      Visit today included increased complexity associated with the care of the episodic problem  diabetes addressed and managing the longitudinal care of the patient due to the serious and/or complex managed problem(s) as above.  Htn stable  Diabetes ? Repeat hga1c; no hypoglycemia though  Lipid stable  Will monitor chronic medical issues and continue current plan of care.      Follow up in about 6 months (around 10/19/2024), or if symptoms worsen or fail to improve.

## 2024-04-24 ENCOUNTER — OFFICE VISIT (OUTPATIENT)
Dept: UROLOGY | Facility: CLINIC | Age: 79
End: 2024-04-24
Payer: MEDICARE

## 2024-04-24 VITALS — HEIGHT: 70 IN | WEIGHT: 171.94 LBS | BODY MASS INDEX: 24.61 KG/M2

## 2024-04-24 DIAGNOSIS — N40.0 BPH WITHOUT URINARY OBSTRUCTION: ICD-10-CM

## 2024-04-24 DIAGNOSIS — R97.20 ELEVATED PSA: Primary | ICD-10-CM

## 2024-04-24 LAB
BILIRUBIN, UA POC OHS: NEGATIVE
BLOOD, UA POC OHS: NEGATIVE
CLARITY, UA POC OHS: CLEAR
COLOR, UA POC OHS: YELLOW
GLUCOSE, UA POC OHS: NEGATIVE
KETONES, UA POC OHS: NEGATIVE
LEUKOCYTES, UA POC OHS: NEGATIVE
NITRITE, UA POC OHS: NEGATIVE
PH, UA POC OHS: 6
PROTEIN, UA POC OHS: NEGATIVE
SPECIFIC GRAVITY, UA POC OHS: 1.02
UROBILINOGEN, UA POC OHS: 0.2

## 2024-04-24 PROCEDURE — 3288F FALL RISK ASSESSMENT DOCD: CPT | Mod: CPTII,S$GLB,, | Performed by: UROLOGY

## 2024-04-24 PROCEDURE — 81003 URINALYSIS AUTO W/O SCOPE: CPT | Mod: QW,S$GLB,, | Performed by: UROLOGY

## 2024-04-24 PROCEDURE — 1159F MED LIST DOCD IN RCRD: CPT | Mod: CPTII,S$GLB,, | Performed by: UROLOGY

## 2024-04-24 PROCEDURE — 1101F PT FALLS ASSESS-DOCD LE1/YR: CPT | Mod: CPTII,S$GLB,, | Performed by: UROLOGY

## 2024-04-24 PROCEDURE — 99999 PR PBB SHADOW E&M-EST. PATIENT-LVL III: CPT | Mod: PBBFAC,,, | Performed by: UROLOGY

## 2024-04-24 PROCEDURE — 1126F AMNT PAIN NOTED NONE PRSNT: CPT | Mod: CPTII,S$GLB,, | Performed by: UROLOGY

## 2024-04-24 PROCEDURE — 99214 OFFICE O/P EST MOD 30 MIN: CPT | Mod: S$GLB,,, | Performed by: UROLOGY

## 2024-04-24 NOTE — PROGRESS NOTES
Subjective:       Patient ID: Tres Peace is a 78 y.o. male.    Chief Complaint: Elevated PSA and Follow-up    HPI    78-year-old with elevated PSA.  His most recent PSA is 4.8.  It has been overall stable for the last 3 years.  He has no family history of prostate cancer.  He has no bothersome urinary symptoms.  He denies hematuria and dysuria.  He takes no BPH medications.  We again discussed screening PSA and its limitation.  We discussed the possibility of early stage prostate cancer.  We discussed prostate needle biopsy.  Discussed the utility of a prostate MRI     Component PSA, Screen   Latest Ref Rng & Units 0.00 - 4.00 ng/mL   4/19/2024 4.8 (H)   3/13/2023 5.4 (H)   3/10/2022 5.0 (H)   9/9/2021 4.1 (H)   6/9/2020 1.4   5/28/2019 4.8 (H)   6/12/2018 2.5   6/16/2017 2.8   3/21/2016 2.1   6/23/2014 1.6       Review of Systems   Constitutional:  Negative for fever.   Genitourinary:  Negative for dysuria and hematuria.       Objective:      Physical Exam  Vitals reviewed.   Constitutional:       Appearance: He is well-developed.   Pulmonary:      Effort: Pulmonary effort is normal.   Skin:     Findings: No rash.   Neurological:      Mental Status: He is alert and oriented to person, place, and time.         Assessment:       1. Elevated PSA    2. BPH without urinary obstruction        Plan:       Elevated PSA  -     POCT Urinalysis(Instrument)  -     Prostate Specific Antigen, Diagnostic; Future; Expected date: 04/24/2025    BPH without urinary obstruction      PSA is overall stable.  We will continue to observe.  Annual follow-up with repeat PSA

## 2024-05-05 ENCOUNTER — PATIENT MESSAGE (OUTPATIENT)
Dept: GASTROENTEROLOGY | Facility: CLINIC | Age: 79
End: 2024-05-05
Payer: MEDICARE

## 2024-05-08 DIAGNOSIS — I10 ESSENTIAL HYPERTENSION: ICD-10-CM

## 2024-05-08 DIAGNOSIS — K21.9 GASTROESOPHAGEAL REFLUX DISEASE: ICD-10-CM

## 2024-05-08 RX ORDER — LOSARTAN POTASSIUM 100 MG/1
TABLET ORAL
Qty: 90 TABLET | Refills: 3 | Status: SHIPPED | OUTPATIENT
Start: 2024-05-08

## 2024-05-08 RX ORDER — OMEPRAZOLE 40 MG/1
CAPSULE, DELAYED RELEASE ORAL
Qty: 90 CAPSULE | Refills: 3 | Status: SHIPPED | OUTPATIENT
Start: 2024-05-08

## 2024-05-08 NOTE — TELEPHONE ENCOUNTER
No care due was identified.  [facility] Embedded Care Due Messages. Reference number: [id number].   5/08/2024 2:17:04 AM CDT

## 2024-05-08 NOTE — TELEPHONE ENCOUNTER
Refill Decision Note   Tres Peace  is requesting a refill authorization.  Brief Assessment and Rationale for Refill:  Approve     Medication Therapy Plan:         Comments:     Note composed:10:58 AM 05/08/2024

## 2024-05-16 ENCOUNTER — PATIENT MESSAGE (OUTPATIENT)
Dept: FAMILY MEDICINE | Facility: CLINIC | Age: 79
End: 2024-05-16
Payer: MEDICARE

## 2024-05-16 DIAGNOSIS — I15.2 HYPERTENSION ASSOCIATED WITH DIABETES: ICD-10-CM

## 2024-05-16 DIAGNOSIS — E11.69 TYPE 2 DIABETES MELLITUS WITH HYPERLIPIDEMIA: ICD-10-CM

## 2024-05-16 DIAGNOSIS — E11.59 HYPERTENSION ASSOCIATED WITH DIABETES: ICD-10-CM

## 2024-05-16 DIAGNOSIS — E78.5 TYPE 2 DIABETES MELLITUS WITH HYPERLIPIDEMIA: ICD-10-CM

## 2024-05-17 DIAGNOSIS — I15.2 HYPERTENSION ASSOCIATED WITH DIABETES: ICD-10-CM

## 2024-05-17 DIAGNOSIS — E78.5 TYPE 2 DIABETES MELLITUS WITH HYPERLIPIDEMIA: ICD-10-CM

## 2024-05-17 DIAGNOSIS — E11.69 TYPE 2 DIABETES MELLITUS WITH HYPERLIPIDEMIA: ICD-10-CM

## 2024-05-17 DIAGNOSIS — E11.59 HYPERTENSION ASSOCIATED WITH DIABETES: ICD-10-CM

## 2024-05-17 RX ORDER — GLIPIZIDE 10 MG/1
10 TABLET ORAL DAILY
Qty: 90 TABLET | Refills: 3 | OUTPATIENT
Start: 2024-05-17

## 2024-05-17 RX ORDER — PEN NEEDLE, DIABETIC 30 GX3/16"
NEEDLE, DISPOSABLE MISCELLANEOUS
Qty: 100 EACH | Refills: 3 | OUTPATIENT
Start: 2024-05-17

## 2024-05-17 NOTE — TELEPHONE ENCOUNTER
No care due was identified.  [facility] Embedded Care Due Messages. Reference number: [id number].   5/17/2024 12:59:06 PM CDT

## 2024-05-17 NOTE — TELEPHONE ENCOUNTER
No care due was identified.  [facility] Embedded Care Due Messages. Reference number: [id number].   5/17/2024 3:04:20 PM CDT

## 2024-05-17 NOTE — TELEPHONE ENCOUNTER
Refill Decision Note   Tres Kobi  is requesting a refill authorization.  Brief Assessment and Rationale for Refill:  Quick Discontinue     Medication Therapy Plan:         Comments:     Note composed:3:24 PM 05/17/2024

## 2024-05-17 NOTE — TELEPHONE ENCOUNTER
No care due was identified.  [facility] Embedded Care Due Messages. Reference number: [id number].   5/17/2024 12:58:23 PM CDT

## 2024-05-18 ENCOUNTER — PATIENT MESSAGE (OUTPATIENT)
Dept: FAMILY MEDICINE | Facility: CLINIC | Age: 79
End: 2024-05-18
Payer: MEDICARE

## 2024-05-18 DIAGNOSIS — E78.5 TYPE 2 DIABETES MELLITUS WITH HYPERLIPIDEMIA: ICD-10-CM

## 2024-05-18 DIAGNOSIS — E11.69 TYPE 2 DIABETES MELLITUS WITH HYPERLIPIDEMIA: ICD-10-CM

## 2024-05-18 RX ORDER — PEN NEEDLE, DIABETIC 30 GX3/16"
NEEDLE, DISPOSABLE MISCELLANEOUS
Qty: 100 EACH | Refills: 3 | Status: SHIPPED | OUTPATIENT
Start: 2024-05-18

## 2024-05-18 RX ORDER — GLIPIZIDE 10 MG/1
10 TABLET ORAL DAILY
Qty: 90 TABLET | Refills: 1 | Status: SHIPPED | OUTPATIENT
Start: 2024-05-18

## 2024-05-19 ENCOUNTER — PATIENT MESSAGE (OUTPATIENT)
Dept: FAMILY MEDICINE | Facility: CLINIC | Age: 79
End: 2024-05-19
Payer: MEDICARE

## 2024-05-19 NOTE — TELEPHONE ENCOUNTER
Refill Decision Note   Tres Peace  is requesting a refill authorization.  Brief Assessment and Rationale for Refill:  Approve     Medication Therapy Plan:         Comments:     Note composed:10:38 PM 05/18/2024

## 2024-05-19 NOTE — TELEPHONE ENCOUNTER
Refill Decision Note   Tres Peace  is requesting a refill authorization.  Brief Assessment and Rationale for Refill:  Approve     Medication Therapy Plan:         Comments:     Note composed:10:39 PM 05/18/2024

## 2024-06-26 ENCOUNTER — PATIENT MESSAGE (OUTPATIENT)
Dept: FAMILY MEDICINE | Facility: CLINIC | Age: 79
End: 2024-06-26
Payer: MEDICARE

## 2024-06-26 DIAGNOSIS — E78.5 TYPE 2 DIABETES MELLITUS WITH HYPERLIPIDEMIA: ICD-10-CM

## 2024-06-26 DIAGNOSIS — E11.69 TYPE 2 DIABETES MELLITUS WITH HYPERLIPIDEMIA: ICD-10-CM

## 2024-06-26 RX ORDER — PEN NEEDLE, DIABETIC 30 GX3/16"
NEEDLE, DISPOSABLE MISCELLANEOUS
Qty: 100 EACH | Refills: 3 | Status: SHIPPED | OUTPATIENT
Start: 2024-06-26

## 2024-06-26 RX ORDER — INSULIN GLARGINE 100 [IU]/ML
INJECTION, SOLUTION SUBCUTANEOUS
Qty: 30 ML | Refills: 3 | Status: SHIPPED | OUTPATIENT
Start: 2024-06-26

## 2024-07-08 ENCOUNTER — PATIENT MESSAGE (OUTPATIENT)
Dept: FAMILY MEDICINE | Facility: CLINIC | Age: 79
End: 2024-07-08
Payer: MEDICARE

## 2024-10-11 ENCOUNTER — TELEPHONE (OUTPATIENT)
Dept: FAMILY MEDICINE | Facility: CLINIC | Age: 79
End: 2024-10-11
Payer: MEDICARE

## 2024-10-11 NOTE — TELEPHONE ENCOUNTER
----- Message from [employer] sent at 10/11/2024  8:37 AM CDT -----  Type:  Needs Medical Advice    Who Called: the patient  Symptoms (please be specific):  How long has patient had these symptoms:    Pharmacy name and phone #:    Would the patient rather a call back or a response via [facility]? call back/  Best Call Back Number: [phone]   Additional Information: lab order for 10/17 appt  Thanks

## 2024-10-17 ENCOUNTER — OFFICE VISIT (OUTPATIENT)
Dept: FAMILY MEDICINE | Facility: CLINIC | Age: 79
End: 2024-10-17
Payer: MEDICARE

## 2024-10-17 ENCOUNTER — LAB VISIT (OUTPATIENT)
Dept: LAB | Facility: HOSPITAL | Age: 79
End: 2024-10-17
Attending: FAMILY MEDICINE
Payer: MEDICARE

## 2024-10-17 VITALS
HEIGHT: 70 IN | WEIGHT: 165.38 LBS | HEART RATE: 57 BPM | BODY MASS INDEX: 23.68 KG/M2 | DIASTOLIC BLOOD PRESSURE: 72 MMHG | OXYGEN SATURATION: 97 % | SYSTOLIC BLOOD PRESSURE: 134 MMHG

## 2024-10-17 DIAGNOSIS — E78.5 TYPE 2 DIABETES MELLITUS WITH HYPERLIPIDEMIA: ICD-10-CM

## 2024-10-17 DIAGNOSIS — E11.69 TYPE 2 DIABETES MELLITUS WITH HYPERLIPIDEMIA: ICD-10-CM

## 2024-10-17 DIAGNOSIS — I10 ESSENTIAL HYPERTENSION: ICD-10-CM

## 2024-10-17 DIAGNOSIS — Z00.00 WELLNESS EXAMINATION: Primary | ICD-10-CM

## 2024-10-17 DIAGNOSIS — I15.2 HYPERTENSION ASSOCIATED WITH DIABETES: ICD-10-CM

## 2024-10-17 DIAGNOSIS — E11.59 HYPERTENSION ASSOCIATED WITH DIABETES: ICD-10-CM

## 2024-10-17 DIAGNOSIS — E78.2 MIXED HYPERLIPIDEMIA: ICD-10-CM

## 2024-10-17 LAB
ALBUMIN SERPL BCP-MCNC: 3.9 G/DL (ref 3.5–5.2)
ALP SERPL-CCNC: 147 U/L (ref 40–150)
ALT SERPL W/O P-5'-P-CCNC: 37 U/L (ref 10–44)
ANION GAP SERPL CALC-SCNC: 9 MMOL/L (ref 8–16)
AST SERPL-CCNC: 31 U/L (ref 10–40)
BILIRUB SERPL-MCNC: 0.7 MG/DL (ref 0.1–1)
BUN SERPL-MCNC: 12 MG/DL (ref 8–23)
CALCIUM SERPL-MCNC: 9.2 MG/DL (ref 8.7–10.5)
CHLORIDE SERPL-SCNC: 105 MMOL/L (ref 95–110)
CHOLEST SERPL-MCNC: 124 MG/DL (ref 120–199)
CHOLEST/HDLC SERPL: 3.4 {RATIO} (ref 2–5)
CO2 SERPL-SCNC: 26 MMOL/L (ref 23–29)
CREAT SERPL-MCNC: 0.9 MG/DL (ref 0.5–1.4)
ERYTHROCYTE [DISTWIDTH] IN BLOOD BY AUTOMATED COUNT: 13.4 % (ref 11.5–14.5)
EST. GFR  (NO RACE VARIABLE): >60 ML/MIN/1.73 M^2
ESTIMATED AVG GLUCOSE: 169 MG/DL (ref 68–131)
GLUCOSE SERPL-MCNC: 115 MG/DL (ref 70–110)
HBA1C MFR BLD: 7.5 % (ref 4–5.6)
HCT VFR BLD AUTO: 44.6 % (ref 40–54)
HDLC SERPL-MCNC: 37 MG/DL (ref 40–75)
HDLC SERPL: 29.8 % (ref 20–50)
HGB BLD-MCNC: 14.3 G/DL (ref 14–18)
LDLC SERPL CALC-MCNC: 76.2 MG/DL (ref 63–159)
MCH RBC QN AUTO: 29 PG (ref 27–31)
MCHC RBC AUTO-ENTMCNC: 32.1 G/DL (ref 32–36)
MCV RBC AUTO: 91 FL (ref 82–98)
NONHDLC SERPL-MCNC: 87 MG/DL
PLATELET # BLD AUTO: 287 K/UL (ref 150–450)
PMV BLD AUTO: 10.1 FL (ref 9.2–12.9)
POTASSIUM SERPL-SCNC: 3.9 MMOL/L (ref 3.5–5.1)
PROT SERPL-MCNC: 7.4 G/DL (ref 6–8.4)
RBC # BLD AUTO: 4.93 M/UL (ref 4.6–6.2)
SODIUM SERPL-SCNC: 140 MMOL/L (ref 136–145)
TRIGL SERPL-MCNC: 54 MG/DL (ref 30–150)
WBC # BLD AUTO: 8.32 K/UL (ref 3.9–12.7)

## 2024-10-17 PROCEDURE — 80061 LIPID PANEL: CPT | Mod: HCNC | Performed by: FAMILY MEDICINE

## 2024-10-17 PROCEDURE — 84443 ASSAY THYROID STIM HORMONE: CPT | Mod: HCNC | Performed by: FAMILY MEDICINE

## 2024-10-17 PROCEDURE — 85027 COMPLETE CBC AUTOMATED: CPT | Mod: HCNC | Performed by: FAMILY MEDICINE

## 2024-10-17 PROCEDURE — 83036 HEMOGLOBIN GLYCOSYLATED A1C: CPT | Mod: HCNC | Performed by: FAMILY MEDICINE

## 2024-10-17 PROCEDURE — 80053 COMPREHEN METABOLIC PANEL: CPT | Mod: HCNC | Performed by: FAMILY MEDICINE

## 2024-10-17 PROCEDURE — 99999 PR PBB SHADOW E&M-EST. PATIENT-LVL III: CPT | Mod: PBBFAC,HCNC,, | Performed by: FAMILY MEDICINE

## 2024-10-17 RX ORDER — GLIPIZIDE 10 MG/1
10 TABLET ORAL DAILY
Qty: 90 TABLET | Refills: 3 | Status: SHIPPED | OUTPATIENT
Start: 2024-10-17

## 2024-10-17 RX ORDER — ATORVASTATIN CALCIUM 40 MG/1
40 TABLET, FILM COATED ORAL DAILY
Qty: 90 TABLET | Refills: 3 | Status: SHIPPED | OUTPATIENT
Start: 2024-10-17

## 2024-10-17 NOTE — PROGRESS NOTES
Subjective:       Patient ID: Tres Peace is a 78 y.o. male.    Chief Complaint: Follow-up (6 month )    Here for wellness and follow up multiple chronic medical issues. Doing well overall and in normal state of health.      Follow-up  Pertinent negatives include no chest pain, chills, coughing or fever.     Review of Systems   Constitutional:  Negative for chills and fever.   Respiratory:  Negative for cough, chest tightness and shortness of breath.    Cardiovascular:  Negative for chest pain, palpitations and leg swelling.   Endocrine: Negative for cold intolerance and heat intolerance.   Psychiatric/Behavioral:  Negative for decreased concentration. The patient is not nervous/anxious.        Objective:      Physical Exam  Vitals and nursing note reviewed.   Constitutional:       Appearance: He is well-developed.   HENT:      Head: Normocephalic and atraumatic.   Cardiovascular:      Rate and Rhythm: Normal rate and regular rhythm.      Heart sounds: Normal heart sounds.   Pulmonary:      Effort: Pulmonary effort is normal.      Breath sounds: Normal breath sounds.   Psychiatric:         Mood and Affect: Mood normal.         Behavior: Behavior normal.         Assessment:       1. Wellness examination    2. Essential hypertension    3. Mixed hyperlipidemia    4. Type 2 diabetes mellitus with hyperlipidemia    5. Hypertension associated with diabetes        Plan:       Wellness examination    Essential hypertension    Mixed hyperlipidemia    Type 2 diabetes mellitus with hyperlipidemia  -     TSH; Future; Expected date: 10/17/2024  -     Lipid Panel; Future; Expected date: 10/17/2024  -     Hemoglobin A1C; Future; Expected date: 10/17/2024  -     Comprehensive Metabolic Panel; Future; Expected date: 10/17/2024  -     CBC Without Differential; Future; Expected date: 10/17/2024  -     Cancel: Microalbumin/Creatinine Ratio, Urine; Future; Expected date: 10/17/2024  -     Microalbumin/Creatinine Ratio, Urine;  Future; Expected date: 10/17/2024  -     atorvastatin (LIPITOR) 40 MG tablet; Take 1 tablet (40 mg total) by mouth once daily.  Dispense: 90 tablet; Refill: 3  -     glipiZIDE (GLUCOTROL) 10 MG tablet; Take 1 tablet (10 mg total) by mouth once daily.  Dispense: 90 tablet; Refill: 3    Hypertension associated with diabetes  -     glipiZIDE (GLUCOTROL) 10 MG tablet; Take 1 tablet (10 mg total) by mouth once daily.  Dispense: 90 tablet; Refill: 3      Will monitor chronic medical issues and continue current plan of care.  Visit today included increased complexity associated with the care of the episodic problem htn addressed and managing the longitudinal care of the patient due to the serious and/or complex managed problem(s) as above.  Htn stable  Lipids stable  Diabetes at goal    Flu shot at pharmacy per request    Follow up in about 6 months (around 4/17/2025), or if symptoms worsen or fail to improve.

## 2024-10-18 LAB — TSH SERPL DL<=0.005 MIU/L-ACNC: 1.55 UIU/ML (ref 0.4–4)

## 2025-02-22 DIAGNOSIS — Z00.00 ENCOUNTER FOR MEDICARE ANNUAL WELLNESS EXAM: ICD-10-CM

## 2025-04-21 ENCOUNTER — OFFICE VISIT (OUTPATIENT)
Dept: FAMILY MEDICINE | Facility: CLINIC | Age: 80
End: 2025-04-21
Payer: MEDICARE

## 2025-04-21 ENCOUNTER — LAB VISIT (OUTPATIENT)
Dept: LAB | Facility: HOSPITAL | Age: 80
End: 2025-04-21
Attending: FAMILY MEDICINE
Payer: MEDICARE

## 2025-04-21 VITALS
HEIGHT: 70 IN | OXYGEN SATURATION: 97 % | DIASTOLIC BLOOD PRESSURE: 76 MMHG | HEART RATE: 65 BPM | BODY MASS INDEX: 23.93 KG/M2 | TEMPERATURE: 98 F | WEIGHT: 167.13 LBS | SYSTOLIC BLOOD PRESSURE: 130 MMHG

## 2025-04-21 DIAGNOSIS — E11.69 TYPE 2 DIABETES MELLITUS WITH HYPERLIPIDEMIA: ICD-10-CM

## 2025-04-21 DIAGNOSIS — I10 ESSENTIAL HYPERTENSION: ICD-10-CM

## 2025-04-21 DIAGNOSIS — E78.5 TYPE 2 DIABETES MELLITUS WITH HYPERLIPIDEMIA: ICD-10-CM

## 2025-04-21 DIAGNOSIS — R97.20 ELEVATED PSA: ICD-10-CM

## 2025-04-21 DIAGNOSIS — E78.2 MIXED HYPERLIPIDEMIA: Primary | ICD-10-CM

## 2025-04-21 LAB
ALBUMIN SERPL BCP-MCNC: 3.8 G/DL (ref 3.5–5.2)
ALP SERPL-CCNC: 136 UNIT/L (ref 40–150)
ALT SERPL W/O P-5'-P-CCNC: 20 UNIT/L (ref 10–44)
ANION GAP (OHS): 6 MMOL/L (ref 8–16)
AST SERPL-CCNC: 20 UNIT/L (ref 11–45)
BILIRUB SERPL-MCNC: 0.6 MG/DL (ref 0.1–1)
BUN SERPL-MCNC: 13 MG/DL (ref 8–23)
CALCIUM SERPL-MCNC: 8.9 MG/DL (ref 8.7–10.5)
CHLORIDE SERPL-SCNC: 106 MMOL/L (ref 95–110)
CHOLEST SERPL-MCNC: 127 MG/DL (ref 120–199)
CHOLEST/HDLC SERPL: 3.3 {RATIO} (ref 2–5)
CO2 SERPL-SCNC: 27 MMOL/L (ref 23–29)
CREAT SERPL-MCNC: 0.8 MG/DL (ref 0.5–1.4)
EAG (OHS): 169 MG/DL (ref 68–131)
ERYTHROCYTE [DISTWIDTH] IN BLOOD BY AUTOMATED COUNT: 13.2 % (ref 11.5–14.5)
GFR SERPLBLD CREATININE-BSD FMLA CKD-EPI: >60 ML/MIN/1.73/M2
GLUCOSE SERPL-MCNC: 74 MG/DL (ref 70–110)
HBA1C MFR BLD: 7.5 % (ref 4–5.6)
HCT VFR BLD AUTO: 43.4 % (ref 40–54)
HDLC SERPL-MCNC: 38 MG/DL (ref 40–75)
HDLC SERPL: 29.9 % (ref 20–50)
HGB BLD-MCNC: 13.9 GM/DL (ref 14–18)
LDLC SERPL CALC-MCNC: 74.4 MG/DL (ref 63–159)
MCH RBC QN AUTO: 29.3 PG (ref 27–31)
MCHC RBC AUTO-ENTMCNC: 32 G/DL (ref 32–36)
MCV RBC AUTO: 91 FL (ref 82–98)
NONHDLC SERPL-MCNC: 89 MG/DL
PLATELET # BLD AUTO: 233 K/UL (ref 150–450)
PMV BLD AUTO: 10 FL (ref 9.2–12.9)
POTASSIUM SERPL-SCNC: 4.7 MMOL/L (ref 3.5–5.1)
PROT SERPL-MCNC: 7.3 GM/DL (ref 6–8.4)
PSA SERPL-MCNC: 5.31 NG/ML
RBC # BLD AUTO: 4.75 M/UL (ref 4.6–6.2)
SODIUM SERPL-SCNC: 139 MMOL/L (ref 136–145)
TRIGL SERPL-MCNC: 73 MG/DL (ref 30–150)
TSH SERPL-ACNC: 2.08 UIU/ML (ref 0.4–4)
WBC # BLD AUTO: 8.69 K/UL (ref 3.9–12.7)

## 2025-04-21 PROCEDURE — 3078F DIAST BP <80 MM HG: CPT | Mod: HCNC,CPTII,S$GLB, | Performed by: FAMILY MEDICINE

## 2025-04-21 PROCEDURE — G2211 COMPLEX E/M VISIT ADD ON: HCPCS | Mod: HCNC,S$GLB,, | Performed by: FAMILY MEDICINE

## 2025-04-21 PROCEDURE — 1126F AMNT PAIN NOTED NONE PRSNT: CPT | Mod: HCNC,CPTII,S$GLB, | Performed by: FAMILY MEDICINE

## 2025-04-21 PROCEDURE — 84443 ASSAY THYROID STIM HORMONE: CPT | Mod: HCNC

## 2025-04-21 PROCEDURE — 80061 LIPID PANEL: CPT | Mod: HCNC

## 2025-04-21 PROCEDURE — 1159F MED LIST DOCD IN RCRD: CPT | Mod: HCNC,CPTII,S$GLB, | Performed by: FAMILY MEDICINE

## 2025-04-21 PROCEDURE — 99214 OFFICE O/P EST MOD 30 MIN: CPT | Mod: HCNC,S$GLB,, | Performed by: FAMILY MEDICINE

## 2025-04-21 PROCEDURE — 3288F FALL RISK ASSESSMENT DOCD: CPT | Mod: HCNC,CPTII,S$GLB, | Performed by: FAMILY MEDICINE

## 2025-04-21 PROCEDURE — 3075F SYST BP GE 130 - 139MM HG: CPT | Mod: HCNC,CPTII,S$GLB, | Performed by: FAMILY MEDICINE

## 2025-04-21 PROCEDURE — 83036 HEMOGLOBIN GLYCOSYLATED A1C: CPT | Mod: HCNC

## 2025-04-21 PROCEDURE — 84153 ASSAY OF PSA TOTAL: CPT | Mod: HCNC

## 2025-04-21 PROCEDURE — 80053 COMPREHEN METABOLIC PANEL: CPT | Mod: HCNC

## 2025-04-21 PROCEDURE — 85027 COMPLETE CBC AUTOMATED: CPT | Mod: HCNC

## 2025-04-21 PROCEDURE — 1101F PT FALLS ASSESS-DOCD LE1/YR: CPT | Mod: HCNC,CPTII,S$GLB, | Performed by: FAMILY MEDICINE

## 2025-04-21 PROCEDURE — 36415 COLL VENOUS BLD VENIPUNCTURE: CPT | Mod: HCNC,PO

## 2025-04-21 PROCEDURE — 99999 PR PBB SHADOW E&M-EST. PATIENT-LVL V: CPT | Mod: PBBFAC,HCNC,, | Performed by: FAMILY MEDICINE

## 2025-04-21 NOTE — PROGRESS NOTES
Subjective:       Patient ID: Tres Peace is a 79 y.o. male.    Chief Complaint: 6 months follow up    HPI: here for f/u diabetes and htn , doing well overall               Assessment:       1. Mixed hyperlipidemia    2. Type 2 diabetes mellitus with hyperlipidemia    3. Essential hypertension        Plan:       Mixed hyperlipidemia    Type 2 diabetes mellitus with hyperlipidemia  -     TSH; Future; Expected date: 04/21/2025  -     Lipid Panel; Future; Expected date: 04/21/2025  -     Hemoglobin A1C; Future; Expected date: 04/21/2025  -     Comprehensive Metabolic Panel; Future; Expected date: 04/21/2025  -     CBC Without Differential; Future; Expected date: 04/21/2025    Essential hypertension    Update labs  Htn and diabetes stable on meds  Will monitor chronic medical issues and continue current plan of care.  Visit today included increased complexity associated with the care of the episodic problem diabetes addressed and managing the longitudinal care of the patient due to the serious and/or complex managed problem(s) as above.      Follow up in about 6 months (around 10/21/2025), or if symptoms worsen or fail to improve.

## 2025-04-22 ENCOUNTER — RESULTS FOLLOW-UP (OUTPATIENT)
Dept: FAMILY MEDICINE | Facility: CLINIC | Age: 80
End: 2025-04-22

## 2025-04-28 ENCOUNTER — OFFICE VISIT (OUTPATIENT)
Dept: UROLOGY | Facility: CLINIC | Age: 80
End: 2025-04-28
Payer: MEDICARE

## 2025-04-28 VITALS — WEIGHT: 167.75 LBS | HEIGHT: 70 IN | BODY MASS INDEX: 24.02 KG/M2

## 2025-04-28 DIAGNOSIS — R97.20 ELEVATED PSA: Primary | ICD-10-CM

## 2025-04-28 PROCEDURE — 99999 PR PBB SHADOW E&M-EST. PATIENT-LVL III: CPT | Mod: PBBFAC,HCNC,, | Performed by: UROLOGY

## 2025-04-28 PROCEDURE — 1126F AMNT PAIN NOTED NONE PRSNT: CPT | Mod: CPTII,HCNC,S$GLB, | Performed by: UROLOGY

## 2025-04-28 PROCEDURE — 99213 OFFICE O/P EST LOW 20 MIN: CPT | Mod: HCNC,S$GLB,, | Performed by: UROLOGY

## 2025-04-28 PROCEDURE — 1159F MED LIST DOCD IN RCRD: CPT | Mod: CPTII,HCNC,S$GLB, | Performed by: UROLOGY

## 2025-04-28 PROCEDURE — 3288F FALL RISK ASSESSMENT DOCD: CPT | Mod: CPTII,HCNC,S$GLB, | Performed by: UROLOGY

## 2025-04-28 PROCEDURE — 1101F PT FALLS ASSESS-DOCD LE1/YR: CPT | Mod: CPTII,HCNC,S$GLB, | Performed by: UROLOGY

## 2025-04-28 NOTE — PROGRESS NOTES
Subjective:       Patient ID: Tres Peace is a 79 y.o. male.    Chief Complaint: Follow-up and Elevated PSA    HPI    CHIEF COMPLAINT:  Patient presents today for follow up of elevated PSA    PSA AND UROLOGIC HISTORY:  PSA is 5.3, which has remained stable over several years with previous values of 4.8 last year and 5.4 the year before. He denies any bothersome urinary symptoms and is not taking any BPH medications.    Component PSA, Screen   Latest Ref Rng & Units 0.00 - 4.00 ng/mL   4/21/2025 5.3 (H)   4/19/2024 4.8 (H)   3/13/2023 5.4 (H)   3/10/2022 5.0 (H)   9/9/2021 4.1 (H)   6/9/2020 1.4   5/28/2019 4.8 (H)   6/12/2018 2.5   6/16/2017 2.8   3/21/2016 2.1   6/23/2014 1.6       FAMILY HISTORY:  He denies family history of prostate cancer.    ROS:  General: -fever, -chills, -fatigue, -weight gain, -weight loss  Eyes: -vision changes, -redness, -discharge  ENT: -ear pain, -nasal congestion, -sore throat  Cardiovascular: -chest pain, -palpitations, -lower extremity edema  Respiratory: -cough, -shortness of breath  Gastrointestinal: -abdominal pain, -nausea, -vomiting, -diarrhea, -constipation, -blood in stool  Genitourinary: -dysuria, -hematuria, -frequency  Musculoskeletal: -joint pain, -muscle pain  Skin: -rash, -lesion  Neurological: -headache, -dizziness, -numbness, -tingling  Psychiatric: -anxiety, -depression, -sleep difficulty             Objective:      Physical Exam  Vitals reviewed.   Constitutional:       Appearance: He is well-developed.   Pulmonary:      Effort: Pulmonary effort is normal.   Neurological:      Mental Status: He is alert and oriented to person, place, and time.           Assessment & Plan    R97.20 Elevated prostate specific antigen [PSA]    IMPRESSION:  - PSA stable at 5.3, consistent with previous years' values (4.8 last year, 5.4 the year before).  - Given age and stable PSA, continued monitoring deemed appropriate.  - No concerning urinary symptoms reported.  - No family  history of prostate cancer, reducing risk factors.    ELEVATED PSA:  - Explained that PSA elevation becomes less concerning with advancing age.  - Clarified that prostate cancer risk is not related to known environmental factors.  - Ordered PSA test to be performed before next year's appointment.    FOLLOW-UP:  - Follow up in 1 year for annual appointment.        Assessment:         1. Elevated PSA          This note was generated with the assistance of ambient listening technology. . I attest to having reviewed and edited the generated note for accuracy, though some syntax or spelling errors may persist. Please contact the author of this note for any clarification.    Plan:       Elevated PSA

## 2025-05-05 DIAGNOSIS — J40 BRONCHITIS: ICD-10-CM

## 2025-05-05 RX ORDER — FLUTICASONE FUROATE AND VILANTEROL 100; 25 UG/1; UG/1
POWDER RESPIRATORY (INHALATION)
Qty: 180 EACH | Refills: 3 | Status: SHIPPED | OUTPATIENT
Start: 2025-05-05

## 2025-05-05 NOTE — TELEPHONE ENCOUNTER
----- Message from Annette sent at 5/5/2025 11:45 AM CDT -----  Pharmacy Calling to Clarify an RXName of Caller:PTPharmacy Name:[id number] [place] - [address] AT [place] [address] [address] [address]Wxbhn: [phone] Fax: [phone] Name:fluticasone furoate-vilanteroL (BREO ELLIPTA) 100-25 mcg/dose diskus inhalerWhat do they need to clarify?:NEED PABest Call Back Number:Telephone Information:Mobile          [phone] Information: Please advise thank you

## 2025-05-05 NOTE — TELEPHONE ENCOUNTER
No care due was identified.  [facility] Embedded Care Due Messages. Reference number: [id number].   5/05/2025 11:57:49 AM CDT

## 2025-06-28 NOTE — TELEPHONE ENCOUNTER
----- Message from Jennifer Marcos sent at 2/28/2019  7:49 AM CST -----  Type: Needs Medical Advice    Who Called:  Patient  Symptoms (please be specific):  Congestion, cant sleep, cough  How long has patient had these symptoms:  Couple of months  Pharmacy name and phone #:    [id number] [place] [address] [facility]  [address] [place] [address]  Phone: [phone] Fax: [phone]    Best Call Back Number: [phone]  Additional Information:   
Ai Deng PA-C   You 1 hour ago (9:29 AM)      He did not have any wheezing or shortness of breath when I examined him, therefore I do not know that an inhaler or breathing treatment would alleviate his symptoms.  If his symptoms have not improved with the treatment that has been completed (oral and IM steroids, antibiotics, cough suppressants), I would either recommend seeing a lung specialist or follow up appointment with Dr. Torres to determine if there is any other underlying reason for cough.     Routing Comment        
Dr Torres could you please advise on below message. Thank you  
[facility]    
He can call and schedule with pulmonologist at this point ;may need pft  [facility]; he can check with [employer] to make sure covered  
I spoke with patient today, states he is still coughing uncontrollably. States Dr Torres sent tessalon perles and they are not helping. States he has been battling this for 2 months. States he cant sleep at night due to cough.    Has lots of chest congestion and phlegm. Has had steroids and abx. Please advise if anything else can be done for this. He is asking if inhaler or neb would help.  
Order pended for review,   Please pend back for staff to forward to provider.   
done  
PCP

## 2025-07-23 LAB
LEFT EYE DM RETINOPATHY: NEGATIVE
RIGHT EYE DM RETINOPATHY: NEGATIVE

## 2025-08-07 DIAGNOSIS — I10 ESSENTIAL HYPERTENSION: ICD-10-CM

## 2025-08-07 DIAGNOSIS — K21.9 GASTROESOPHAGEAL REFLUX DISEASE: ICD-10-CM

## 2025-08-07 RX ORDER — OMEPRAZOLE 40 MG/1
40 CAPSULE, DELAYED RELEASE ORAL EVERY MORNING
Qty: 90 CAPSULE | Refills: 2 | Status: SHIPPED | OUTPATIENT
Start: 2025-08-07

## 2025-08-07 RX ORDER — LOSARTAN POTASSIUM 100 MG/1
100 TABLET ORAL
Qty: 90 TABLET | Refills: 2 | Status: SHIPPED | OUTPATIENT
Start: 2025-08-07

## 2025-08-07 NOTE — TELEPHONE ENCOUNTER
Refill Decision Note   Tres Peace  is requesting a refill authorization.  Brief Assessment and Rationale for Refill:  Approve     Medication Therapy Plan:         Comments:     Note composed:9:13 AM 08/07/2025

## 2025-08-13 ENCOUNTER — PATIENT OUTREACH (OUTPATIENT)
Dept: ADMINISTRATIVE | Facility: HOSPITAL | Age: 80
End: 2025-08-13
Payer: MEDICARE